# Patient Record
Sex: FEMALE | Race: WHITE | NOT HISPANIC OR LATINO | Employment: UNEMPLOYED | URBAN - NONMETROPOLITAN AREA
[De-identification: names, ages, dates, MRNs, and addresses within clinical notes are randomized per-mention and may not be internally consistent; named-entity substitution may affect disease eponyms.]

---

## 2019-12-10 ENCOUNTER — OFFICE VISIT (OUTPATIENT)
Dept: FAMILY MEDICINE | Facility: OTHER | Age: 34
End: 2019-12-10
Attending: NURSE PRACTITIONER
Payer: COMMERCIAL

## 2019-12-10 ENCOUNTER — HOSPITAL ENCOUNTER (OUTPATIENT)
Dept: GENERAL RADIOLOGY | Facility: OTHER | Age: 34
Discharge: HOME | End: 2019-12-10
Attending: NURSE PRACTITIONER | Admitting: NURSE PRACTITIONER
Payer: COMMERCIAL

## 2019-12-10 VITALS
RESPIRATION RATE: 20 BRPM | TEMPERATURE: 98.5 F | BODY MASS INDEX: 33.96 KG/M2 | SYSTOLIC BLOOD PRESSURE: 118 MMHG | HEART RATE: 92 BPM | OXYGEN SATURATION: 98 % | HEIGHT: 64 IN | WEIGHT: 198.9 LBS | DIASTOLIC BLOOD PRESSURE: 66 MMHG

## 2019-12-10 DIAGNOSIS — R05.9 COUGH: ICD-10-CM

## 2019-12-10 DIAGNOSIS — J00 ACUTE NASOPHARYNGITIS: Primary | ICD-10-CM

## 2019-12-10 PROCEDURE — 71046 X-RAY EXAM CHEST 2 VIEWS: CPT

## 2019-12-10 PROCEDURE — 99202 OFFICE O/P NEW SF 15 MIN: CPT | Performed by: NURSE PRACTITIONER

## 2019-12-10 ASSESSMENT — ENCOUNTER SYMPTOMS
APPETITE CHANGE: 1
SHORTNESS OF BREATH: 1
FEVER: 0
RHINORRHEA: 0
COUGH: 1
HEMATOLOGIC/LYMPHATIC NEGATIVE: 1
SORE THROAT: 1
HEADACHES: 1
GASTROINTESTINAL NEGATIVE: 1

## 2019-12-10 ASSESSMENT — PAIN SCALES - GENERAL: PAINLEVEL: MODERATE PAIN (5)

## 2019-12-10 ASSESSMENT — MIFFLIN-ST. JEOR: SCORE: 1592.2

## 2019-12-10 NOTE — PROGRESS NOTES
"  SUBJECTIVE:   Suzi Miller is a 34 year old female who presents to clinic today for the following health issues:    HPI  Presents with a 3 day history of harsh cough, some sinus congestion - PND, no fever. Pain with cough, feels SOB. Has restrictive airway disease. Moved here in August from [place]. Using sandi for symptoms. Exposed to illness at home. Child at home just finished abx for strep yesterday.      There are no active problems to display for this patient.    History reviewed. No pertinent past medical history.   History reviewed. No pertinent surgical history.  History reviewed. No pertinent family history.  Social History     Tobacco Use     Smoking status: Never Smoker     Smokeless tobacco: Never Used   Substance Use Topics     Alcohol use: Yes     Comment: rare     Social History     Social History Narrative     Not on file     No current outpatient medications on file.     Allergies   Allergen Reactions     Penicillins Anaphylaxis       Review of Systems   Constitutional: Positive for appetite change. Negative for fever.        Pushing fluids   HENT: Positive for postnasal drip and sore throat. Negative for congestion and rhinorrhea.    Respiratory: Positive for cough and shortness of breath.    Gastrointestinal: Negative.    Genitourinary: Negative.    Neurological: Positive for headaches.   Hematological: Negative.         OBJECTIVE:     /66   Pulse 92   Temp 98.5  F (36.9  C) (Tympanic)   Resp 20   Ht 1.626 m (5' 4\")   Wt 90.2 kg (198 lb 14.4 oz)   SpO2 98%   BMI 34.14 kg/m    Body mass index is 34.14 kg/m .  Physical Exam  Vitals signs and nursing note reviewed.   Constitutional:       General: She is not in acute distress.     Appearance: Normal appearance. She is not toxic-appearing.      Comments: Appears mildly ill, not toxic   HENT:      Head: Normocephalic and atraumatic.      Right Ear: Tympanic membrane normal.      Left Ear: Tympanic membrane normal.      Nose: Nose " normal.      Mouth/Throat:      Mouth: Mucous membranes are moist.      Pharynx: Oropharynx is clear. No oropharyngeal exudate.   Eyes:      General: No scleral icterus.     Conjunctiva/sclera: Conjunctivae normal.   Neck:      Musculoskeletal: Normal range of motion and neck supple.   Cardiovascular:      Rate and Rhythm: Normal rate and regular rhythm.      Heart sounds: Normal heart sounds. No murmur.   Pulmonary:      Effort: Pulmonary effort is normal.      Comments: Harsh dry cough, dim left lower lobe, no wheezing  Musculoskeletal: Normal range of motion.   Lymphadenopathy:      Cervical: No cervical adenopathy.   Skin:     General: Skin is warm and dry.   Neurological:      General: No focal deficit present.      Mental Status: She is alert and oriented to person, place, and time.   Psychiatric:         Mood and Affect: Mood normal.         Behavior: Behavior normal.         Diagnostic Test Results:  Results for orders placed or performed in visit on 12/10/19 (from the past 24 hour(s))   XR Chest 2 Views    Narrative    PROCEDURE: XR CHEST 2 VW 12/10/2019 11:39 AM    HISTORY: Cough    COMPARISONS: None.    TECHNIQUE: 2 views.    FINDINGS: Heart and pulmonary vasculature are normal. No acute  infiltrate or effusion is seen.         Impression    IMPRESSION: No acute disease.    LORELEI RMOERO MD       ASSESSMENT/PLAN:       ICD-10-CM    1. Acute nasopharyngitis J00    2. Cough R05 XR Chest 2 Views        PLAN:  Pt is afebrile, 98%, CXR is clear. Does not appear toxic.   Advised likely viral. Encouraged conservative treatments at home.   Monitor closely, f/u in the clinic in 1 week to establish care, sooner PRN.  Given Epic educational materials.     I explained my diagnostic considerations and recommendations to pt who voiced understanding and agreement with the treatment plan. All questions were answered. We discussed potential side effects of any prescribed or recommended therapies, as well as  expectations for response to treatments.    Disclaimer:  This note consists of words and symbols derived from keyboarding, dictation, or using voice recognition software. As a result, there may be errors in the script that have gone undetected. Please consider this when interpreting information found in this note.      ETHEL Hunter, NP-C  12/10/2019 at 11:19 AM  [facility]

## 2019-12-10 NOTE — NURSING NOTE
"Chief Complaint   Patient presents with     Cough     Patient is here for a cough, sore throat, chest pain from coughing and SOB that started Sunday.     Initial /66   Pulse 92   Temp 98.5  F (36.9  C) (Tympanic)   Resp 20   Ht 1.626 m (5' 4\")   Wt 90.2 kg (198 lb 14.4 oz)   SpO2 98%   BMI 34.14 kg/m   Estimated body mass index is 34.14 kg/m  as calculated from the following:    Height as of this encounter: 1.626 m (5' 4\").    Weight as of this encounter: 90.2 kg (198 lb 14.4 oz).  Medication Reconciliation: complete    Amira Salgado LPN  "

## 2019-12-17 ENCOUNTER — OFFICE VISIT (OUTPATIENT)
Dept: FAMILY MEDICINE | Facility: OTHER | Age: 34
End: 2019-12-17
Attending: PHYSICIAN ASSISTANT
Payer: COMMERCIAL

## 2019-12-17 ENCOUNTER — HOSPITAL ENCOUNTER (OUTPATIENT)
Dept: GENERAL RADIOLOGY | Facility: OTHER | Age: 34
Discharge: HOME | End: 2019-12-17
Attending: PHYSICIAN ASSISTANT | Admitting: PHYSICIAN ASSISTANT
Payer: COMMERCIAL

## 2019-12-17 VITALS
DIASTOLIC BLOOD PRESSURE: 82 MMHG | WEIGHT: 196.3 LBS | HEART RATE: 88 BPM | RESPIRATION RATE: 20 BRPM | OXYGEN SATURATION: 95 % | SYSTOLIC BLOOD PRESSURE: 120 MMHG | BODY MASS INDEX: 32.71 KG/M2 | HEIGHT: 65 IN | TEMPERATURE: 99.5 F

## 2019-12-17 DIAGNOSIS — J45.909 BRONCHITIS WITH ASTHMA, ACUTE: Primary | ICD-10-CM

## 2019-12-17 DIAGNOSIS — R05.9 COUGH: ICD-10-CM

## 2019-12-17 DIAGNOSIS — J20.9 BRONCHITIS WITH ASTHMA, ACUTE: Primary | ICD-10-CM

## 2019-12-17 PROCEDURE — 99214 OFFICE O/P EST MOD 30 MIN: CPT | Performed by: PHYSICIAN ASSISTANT

## 2019-12-17 PROCEDURE — 71046 X-RAY EXAM CHEST 2 VIEWS: CPT

## 2019-12-17 RX ORDER — BENZONATATE 200 MG/1
CAPSULE ORAL
Refills: 0 | COMMUNITY
Start: 2019-02-15 | End: 2020-02-20

## 2019-12-17 RX ORDER — ALBUTEROL SULFATE 0.83 MG/ML
2.5 SOLUTION RESPIRATORY (INHALATION) EVERY 6 HOURS PRN
Qty: 30 VIAL | Refills: 0 | Status: SHIPPED | OUTPATIENT
Start: 2019-12-17 | End: 2020-02-20

## 2019-12-17 RX ORDER — BENZONATATE 200 MG/1
200 CAPSULE ORAL 3 TIMES DAILY PRN
Qty: 21 CAPSULE | Refills: 0 | Status: SHIPPED | OUTPATIENT
Start: 2019-12-17 | End: 2020-02-20

## 2019-12-17 RX ORDER — METHYLPREDNISOLONE 4 MG
TABLET, DOSE PACK ORAL
Qty: 21 TABLET | Refills: 0 | Status: SHIPPED | OUTPATIENT
Start: 2019-12-17 | End: 2020-02-20

## 2019-12-17 RX ORDER — ALBUTEROL SULFATE 90 UG/1
1-2 AEROSOL, METERED RESPIRATORY (INHALATION) EVERY 6 HOURS PRN
Qty: 1 INHALER | Refills: 0 | Status: SHIPPED | OUTPATIENT
Start: 2019-12-17 | End: 2020-02-20

## 2019-12-17 ASSESSMENT — PAIN SCALES - GENERAL: PAINLEVEL: MODERATE PAIN (4)

## 2019-12-17 ASSESSMENT — MIFFLIN-ST. JEOR: SCORE: 1589.41

## 2019-12-17 NOTE — NURSING NOTE
"Chief Complaint   Patient presents with     Cough     states throat isn't sore but feels pressure.       Initial /82   Pulse 88   Temp 99.5  F (37.5  C) (Tympanic)   Resp 20   Ht 1.64 m (5' 4.57\")   Wt 89 kg (196 lb 4.8 oz)   LMP 11/26/2019 (Exact Date)   SpO2 95%   BMI 33.11 kg/m   Estimated body mass index is 33.11 kg/m  as calculated from the following:    Height as of this encounter: 1.64 m (5' 4.57\").    Weight as of this encounter: 89 kg (196 lb 4.8 oz).  Medication Reconciliation: complete    Carmella Cantu LPN  "

## 2019-12-17 NOTE — PROGRESS NOTES
"SUBJECTIVE:  Suzi Miller is a 34 year old female who presents to the clinic today for evaluation of cough.   Onset 9 days ago, course is gradually worsening  Associated symptoms: dry deep cough, chest pain with deep breath and cough. Left ear and throat pain.   No fever    Treatments - Mucinex DM x 3 days with no help. Caused her to throw up  Exposures - not known  History of asthma - positive, she hasn't used inhaler for many years  Environmental allergies - when she was younger - no routine treatments  Tobacco use or second hand smoke exposure history - none   She has had pneumonia 3 times      No past medical history on file.   Social History     Tobacco Use     Smoking status: Never Smoker     Smokeless tobacco: Never Used   Substance Use Topics     Alcohol use: Yes     Comment: rare     Current Outpatient Medications   Medication     APAP-Parabrom-Pyrilamine (PAMPRIN MULTI-SYMPTOM) 500-25-15 MG TABS     melatonin 3 MG CAPS     benzonatate (TESSALON) 200 MG capsule     No current facility-administered medications for this visit.         Allergies   Allergen Reactions     Penicillins Anaphylaxis       ROS  General fatigue, no fever  HENT PND, left ear pain  Respiratory POSITIVE per HPI  Abdomen negative  Skin: negative      OBJECTIVE:  Exam:  Vitals:    12/17/19 1119   BP: 120/82   Pulse: 88   Resp: 20   Temp: 99.5  F (37.5  C)   TempSrc: Tympanic   SpO2: 95%   Weight: 89 kg (196 lb 4.8 oz)   Height: 1.64 m (5' 4.57\")     General: healthy, alert and no distress, appears hydarated, vital signs stable   Head: NORMAL - atraumatic, nontender.  Ears: R TM mild effusion, L TM retracted  Eyes: NORMAL - no injection no discharge, no periorbital swelling.  Nose: ABNORMAL - swollen nasal turbinates. No sinus tenderness  Neck: supple, non-tender, free range of motion, no adenopathy  Throat: ABNORMAL - mild erythema, no exudates or petechia.  Resp: Normal - Clear to auscultation without rales, rhonchi, or " wheezing.  Cardiac: NORMAL - regular rate and rhythm without murmur.  Skin: NORMAL - no rash      XR Chest 2 Views    Narrative    PROCEDURE:  XR CHEST 2 VW    HISTORY: cough, chest pain on left; Cough, .    COMPARISON:  12/10/19    FINDINGS:  The cardiomediastinal contours are normal.  The trachea is midline.  No focal consolidation, effusion or pneumothorax.    No suspicious osseous lesion or subdiaphragmatic free air.      Impression    IMPRESSION:      No acute cardiopulmonary process.  Stable chest.    EDIE SANCHEZ MD         ASSESSMENT:    (J20.9,  J45.909) Bronchitis with asthma, acute  (primary encounter diagnosis)  Plan: methylPREDNISolone (MEDROL DOSEPAK) 4 MG tablet        therapy pack, albuterol (PROVENTIL) (2.5         MG/3ML) 0.083% neb solution, albuterol (PROAIR         HFA/PROVENTIL HFA/VENTOLIN HFA) 108 (90 Base)         MCG/ACT inhaler, benzonatate (TESSALON) 200 MG         capsule    (R05) Cough  Plan: XR Chest 2 Views      Acute Bronchitis with asthma exacerbation  Chest XR negative for pneumonia. XR independently reviewed.   Rest, fluids  Humidified air  Start medrol dose pack, take as directed  Albuterol Neb or inhaler every 4-6 hours as needed for cough, wheezing  Benzonatate 200 mg oral tablet, take 3 times/day as needed for cough  OTC guaifenesin with dextromethorphan (Mucinex/robitussin DM) take as directed  Return to clinic if symptoms persist or worsen  Patient received verbal and written instruction including review of warning signs    Susana King PA-C on 12/19/2019 at 7:23 AM

## 2020-02-20 ENCOUNTER — OFFICE VISIT (OUTPATIENT)
Dept: FAMILY MEDICINE | Facility: OTHER | Age: 35
End: 2020-02-20
Attending: FAMILY MEDICINE
Payer: COMMERCIAL

## 2020-02-20 VITALS
HEIGHT: 64 IN | RESPIRATION RATE: 18 BRPM | DIASTOLIC BLOOD PRESSURE: 72 MMHG | WEIGHT: 203 LBS | HEART RATE: 80 BPM | OXYGEN SATURATION: 98 % | TEMPERATURE: 97.6 F | SYSTOLIC BLOOD PRESSURE: 120 MMHG | BODY MASS INDEX: 34.66 KG/M2

## 2020-02-20 DIAGNOSIS — E06.3 HASHIMOTO'S THYROIDITIS: Primary | ICD-10-CM

## 2020-02-20 LAB
BASOPHILS # BLD AUTO: 0 10E9/L (ref 0–0.2)
BASOPHILS NFR BLD AUTO: 0.5 %
DIFFERENTIAL METHOD BLD: NORMAL
EOSINOPHIL # BLD AUTO: 0.2 10E9/L (ref 0–0.7)
EOSINOPHIL NFR BLD AUTO: 2.3 %
ERYTHROCYTE [DISTWIDTH] IN BLOOD BY AUTOMATED COUNT: 12.1 % (ref 10–15)
HCT VFR BLD AUTO: 39.1 % (ref 35–47)
HGB BLD-MCNC: 12.8 G/DL (ref 11.7–15.7)
IMM GRANULOCYTES # BLD: 0 10E9/L (ref 0–0.4)
IMM GRANULOCYTES NFR BLD: 0.4 %
LYMPHOCYTES # BLD AUTO: 2.8 10E9/L (ref 0.8–5.3)
LYMPHOCYTES NFR BLD AUTO: 35.4 %
MCH RBC QN AUTO: 30.7 PG (ref 26.5–33)
MCHC RBC AUTO-ENTMCNC: 32.7 G/DL (ref 31.5–36.5)
MCV RBC AUTO: 94 FL (ref 78–100)
MONOCYTES # BLD AUTO: 0.6 10E9/L (ref 0–1.3)
MONOCYTES NFR BLD AUTO: 7.7 %
NEUTROPHILS # BLD AUTO: 4.3 10E9/L (ref 1.6–8.3)
NEUTROPHILS NFR BLD AUTO: 53.7 %
PLATELET # BLD AUTO: 329 10E9/L (ref 150–450)
RBC # BLD AUTO: 4.17 10E12/L (ref 3.8–5.2)
T3FREE SERPL-MCNC: 3.4 PG/ML (ref 2.5–3.9)
T4 FREE SERPL-MCNC: 0.8 NG/DL (ref 0.6–1.6)
TSH SERPL DL<=0.05 MIU/L-ACNC: 1.96 IU/ML (ref 0.34–5.6)
WBC # BLD AUTO: 7.9 10E9/L (ref 4–11)

## 2020-02-20 PROCEDURE — 84439 ASSAY OF FREE THYROXINE: CPT | Mod: ZL | Performed by: FAMILY MEDICINE

## 2020-02-20 PROCEDURE — 99213 OFFICE O/P EST LOW 20 MIN: CPT | Performed by: FAMILY MEDICINE

## 2020-02-20 PROCEDURE — 36415 COLL VENOUS BLD VENIPUNCTURE: CPT | Mod: ZL | Performed by: FAMILY MEDICINE

## 2020-02-20 PROCEDURE — 84481 FREE ASSAY (FT-3): CPT | Mod: ZL | Performed by: FAMILY MEDICINE

## 2020-02-20 PROCEDURE — 84443 ASSAY THYROID STIM HORMONE: CPT | Mod: ZL | Performed by: FAMILY MEDICINE

## 2020-02-20 PROCEDURE — 85025 COMPLETE CBC W/AUTO DIFF WBC: CPT | Mod: ZL | Performed by: FAMILY MEDICINE

## 2020-02-20 ASSESSMENT — MIFFLIN-ST. JEOR: SCORE: 1614.77

## 2020-02-20 ASSESSMENT — PAIN SCALES - GENERAL: PAINLEVEL: NO PAIN (0)

## 2020-02-20 NOTE — NURSING NOTE
Pt presents to clinic today to Establish care and discuss her thyroid.      Medication Reconciliation: complete  Natalya Santos LPN

## 2020-02-20 NOTE — PROGRESS NOTES
"  SUBJECTIVE:   Suzi Miller is a 34 year old female who presents to clinic today for the following health issues:  Nursing Notes:   Natalya Santos, LPN  2/20/2020 11:13 AM  Signed  Pt presents to clinic today to Establish care and discuss her thyroid.      Medication Reconciliation: complete  Natalya Santos LPN    HPI   33 yo female presents to establish care. Moved from [place] to be near family.     with 3 boys (14, 11,8)    Hashimoto's thyroiditis diagnosed 1 year ago after a thyroid nodule. Has never been on medication. Complains of weight gain, fatigue and diffuse pain. No constipation or hair/skin changes      History of LEEP, paps normal since            There are no active problems to display for this patient.    Past Medical History:   Diagnosis Date     Endometriosis      Hashimoto's thyroiditis       Current Outpatient Medications   Medication Sig Dispense Refill     APAP-Parabrom-Pyrilamine (PAMPRIN MULTI-SYMPTOM) 500-25-15 MG TABS Take 2 tablets by mouth At Bedtime       melatonin 3 MG CAPS Take 1 capsule by mouth At Bedtime       UNABLE TO FIND MEDICATION NAME: Protandim         Review of Systems   Weight is stable.  Complains of fatigue.  No constipation hair or skin changes.    OBJECTIVE:     /72   Pulse 80   Temp 97.6  F (36.4  C) (Temporal)   Resp 18   Ht 1.632 m (5' 4.25\")   Wt 92.1 kg (203 lb)   LMP 02/15/2020   SpO2 98%   Breastfeeding No   BMI 34.57 kg/m    Body mass index is 34.57 kg/m .  Physical Exam  Neck:      Musculoskeletal: Normal range of motion. No neck rigidity.   Cardiovascular:      Rate and Rhythm: Normal rate.      Heart sounds: No murmur.   Lymphadenopathy:      Cervical: No cervical adenopathy.   Neurological:      Mental Status: She is alert.   Psychiatric:         Mood and Affect: Mood normal.         Diagnostic Test Results:  none     ASSESSMENT/PLAN:         1. Hashimoto's thyroiditis    - TSH; Future  - T3, Free; Future  - T4, Free; Future  - " CBC with platelets differential; Future  - CBC with platelets differential  - T4, Free  - T3, Free  - TSH  Recommend proceeding with above laboratory results.  Could be related to hypothyroidism.  Have asked her to return to clinic to follow-up on these results and discuss other potential options.  Will try to obtain medical records from [place].    Sheree White MD  [facility]

## 2020-02-21 ASSESSMENT — ASTHMA QUESTIONNAIRES: ACT_TOTALSCORE: 25

## 2020-03-11 ENCOUNTER — HEALTH MAINTENANCE LETTER (OUTPATIENT)
Age: 35
End: 2020-03-11

## 2020-10-08 ENCOUNTER — OFFICE VISIT (OUTPATIENT)
Dept: FAMILY MEDICINE | Facility: OTHER | Age: 35
End: 2020-10-08
Payer: COMMERCIAL

## 2020-10-08 VITALS
OXYGEN SATURATION: 100 % | WEIGHT: 209.3 LBS | RESPIRATION RATE: 16 BRPM | HEIGHT: 65 IN | TEMPERATURE: 98.4 F | DIASTOLIC BLOOD PRESSURE: 98 MMHG | BODY MASS INDEX: 34.87 KG/M2 | SYSTOLIC BLOOD PRESSURE: 136 MMHG | HEART RATE: 82 BPM
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
## 2020-10-08 DIAGNOSIS — S61.051A CAT BITE OF THUMB, RIGHT, INITIAL ENCOUNTER: Primary | ICD-10-CM

## 2020-10-08 PROCEDURE — 99214 OFFICE O/P EST MOD 30 MIN: CPT | Mod: 25 | Performed by: NURSE PRACTITIONER

## 2020-10-08 PROCEDURE — 90471 IMMUNIZATION ADMIN: CPT | Performed by: NURSE PRACTITIONER

## 2020-10-08 PROCEDURE — 90715 TDAP VACCINE 7 YRS/> IM: CPT | Performed by: NURSE PRACTITIONER

## 2020-10-08 RX ORDER — MOXIFLOXACIN HYDROCHLORIDE 400 MG/1
400 TABLET ORAL DAILY
Qty: 5 TABLET | Refills: 0 | Status: SHIPPED | OUTPATIENT
Start: 2020-10-08 | End: 2020-10-13

## 2020-10-08 ASSESSMENT — PAIN SCALES - GENERAL: PAINLEVEL: SEVERE PAIN (7)

## 2020-10-08 ASSESSMENT — MIFFLIN-ST. JEOR: SCORE: 1642.32

## 2020-10-08 NOTE — LETTER
My Asthma Action Plan    Name: Suzi Miller   YOB: 1985  Date: 10/8/2020   My doctor: BAIRON MELENDEZ NURSE   My clinic: [facility]        My Rescue Medicine:   Albuterol inhaler (Proair/Ventolin/Proventil HFA)  2-4 puffs EVERY 4 HOURS as needed. Use a spacer if recommended by your provider.   My Asthma Severity:   Intermittent / Exercise Induced  Know your asthma triggers: smoke             GREEN ZONE   Good Control    I feel good    No cough or wheeze    Can work, sleep and play without asthma symptoms       Take your asthma control medicine every day.     1. If exercise triggers your asthma, take your rescue medication    15 minutes before exercise or sports, and    During exercise if you have asthma symptoms  2. Spacer to use with inhaler: If you have a spacer, make sure to use it with your inhaler             YELLOW ZONE Getting Worse  I have ANY of these:    I do not feel good    Cough or wheeze    Chest feels tight    Wake up at night   1. Keep taking your Green Zone medications  2. Start taking your rescue medicine:    every 20 minutes for up to 1 hour. Then every 4 hours for 24-48 hours.  3. If you stay in the Yellow Zone for more than 12-24 hours, contact your doctor.  4. If you do not return to the Green Zone in 12-24 hours or you get worse, start taking your oral steroid medicine if prescribed by your provider.           RED ZONE Medical Alert - Get Help  I have ANY of these:    I feel awful    Medicine is not helping    Breathing getting harder    Trouble walking or talking    Nose opens wide to breathe       1. Take your rescue medicine NOW  2. If your provider has prescribed an oral steroid medicine, start taking it NOW  3. Call your doctor NOW  4. If you are still in the Red Zone after 20 minutes and you have not reached your doctor:    Take your rescue medicine again and    Call 911 or go to the emergency room right away    See your regular doctor within 2 weeks of an  Emergency Room or Urgent Care visit for follow-up treatment.          Annual Reminders:  Meet with Asthma Educator,  Flu Shot in the Fall, consider Pneumonia Vaccination for patients with asthma (aged 19 and older).    Pharmacy: [id number] - [place], [place] - [address]    Electronically signed by  NORA NURSE   Date: 10/08/20                    Asthma Triggers  How To Control Things That Make Your Asthma Worse    Triggers are things that make your asthma worse.  Look at the list below to help you find your triggers and   what you can do about them. You can help prevent asthma flare-ups by staying away from your triggers.      Trigger                                                          What you can do   Cigarette Smoke  Tobacco smoke can make asthma worse. Do not allow smoking in your home, car or around you.  Be sure no one smokes at a child s day care or school.  If you smoke, ask your health care provider for ways to help you quit.  Ask family members to quit too.  Ask your health care provider for a referral to Quit Plan to help you quit smoking, or call 7-917-056-PLAN.     Colds, Flu, Bronchitis  These are common triggers of asthma. Wash your hands often.  Don t touch your eyes, nose or mouth.  Get a flu shot every year.     Dust Mites  These are tiny bugs that live in cloth or carpet. They are too small to see. Wash sheets and blankets in hot water every week.   Encase pillows and mattress in dust mite proof covers.  Avoid having carpet if you can. If you have carpet, vacuum weekly.   Use a dust mask and HEPA vacuum.   Pollen and Outdoor Mold  Some people are allergic to trees, grass, or weed pollen, or molds. Try to keep your windows closed.  Limit time out doors when pollen count is high.   Ask you health care provider about taking medicine during allergy season.     Animal Dander  Some people are allergic to skin flakes, urine or saliva from pets with fur or  feathers. Keep pets with fur or feathers out of your home.    If you can t keep the pet outdoors, then keep the pet out of your bedroom.  Keep the bedroom door closed.  Keep pets off cloth furniture and away from stuffed toys.     Mice, Rats, and Cockroaches  Some people are allergic to the waste from these pests.   Cover food and garbage.  Clean up spills and food crumbs.  Store grease in the refrigerator.   Keep food out of the bedroom.   Indoor Mold  This can be a trigger if your home has high moisture. Fix leaking faucets, pipes, or other sources of water.   Clean moldy surfaces.  Dehumidify basement if it is damp and smelly.   Smoke, Strong Odors, and Sprays  These can reduce air quality. Stay away from strong odors and sprays, such as perfume, powder, hair spray, paints, smoke incense, paint, cleaning products, candles and new carpet.   Exercise or Sports  Some people with asthma have this trigger. Be active!  Ask your doctor about taking medicine before sports or exercise to prevent symptoms.    Warm up for 5-10 minutes before and after sports or exercise.     Other Triggers of Asthma  Cold air:  Cover your nose and mouth with a scarf.  Sometimes laughing or crying can be a trigger.  Some medicines and food can trigger asthma.

## 2020-10-09 ASSESSMENT — ASTHMA QUESTIONNAIRES: ACT_TOTALSCORE: 25

## 2020-10-09 NOTE — NURSING NOTE
"Chief Complaint   Patient presents with     Cat Bite     right hand     Cat bit her right thumb tonight while giver the cat medicine and scratched the back of her hand.    Initial BP (!) 136/98   Pulse 82   Temp 98.4  F (36.9  C) (Tympanic)   Resp 16   Ht 1.638 m (5' 4.5\")   Wt 94.9 kg (209 lb 4.8 oz)   SpO2 100%   BMI 35.37 kg/m   Estimated body mass index is 35.37 kg/m  as calculated from the following:    Height as of this encounter: 1.638 m (5' 4.5\").    Weight as of this encounter: 94.9 kg (209 lb 4.8 oz).    Medication Reconciliation: complete      Norma J. Gosselin, LPN  "

## 2020-10-09 NOTE — PROGRESS NOTES
"HPI:    Suzi Miller is a 35 year old female  who presents to [facility] today for cat bite.    States she was giving her cat medication and the cat bite down on her right thumb causing a puncture wound.  Pain initially just at the location of the bite but now pain includes entire thumb and radiating down wrist.  Injury occurred about 4 hours ago.  Indoor cat but got outside a few weeks ago and got fleas.  He has had his rabies vaccine in the past but is recently due for his next dose.  He is not exhibiting any signs of illness or aggression.  She has washed the wound at home with soapy water.    Unsure of last tetanus, none documented in chart.    No tylenol or ibuprofen.      Past Medical History:   Diagnosis Date     Endometriosis      Hashimoto's thyroiditis      Past Surgical History:   Procedure Laterality Date     C/SECTION, LOW TRANSVERSE       CLOSED RX BIG TOE FRACTURE  2009    hardware     EXCISE NODULE THYROID       LEEP TX, CERVICAL  2003     Social History     Tobacco Use     Smoking status: Never Smoker     Smokeless tobacco: Never Used   Substance Use Topics     Alcohol use: Yes     Comment: rare     Current Outpatient Medications   Medication Sig Dispense Refill     APAP-Parabrom-Pyrilamine (PAMPRIN MULTI-SYMPTOM) 500-25-15 MG TABS Take 2 tablets by mouth At Bedtime       melatonin 3 MG CAPS Take 1 capsule by mouth At Bedtime       UNABLE TO FIND MEDICATION NAME: Protandim       Allergies   Allergen Reactions     Penicillins Anaphylaxis         Past medical history, past surgical history, current medications and allergies reviewed and accurate to the best of my knowledge.        ROS:  Refer to HPI    BP (!) 136/98   Pulse 82   Temp 98.4  F (36.9  C) (Tympanic)   Resp 16   Ht 1.638 m (5' 4.5\")   Wt 94.9 kg (209 lb 4.8 oz)   SpO2 100%   BMI 35.37 kg/m      EXAM:  General Appearance: Well appearing adult female, non ill appearance, appropriate appearance for age. No acute " distress  Cardiovascular:  CMS intact to right upper extremity, brisk capillary refill, strong radial pulse   Musculoskeletal:  Equal movement of bilateral upper extremities.  Right thumb without noted swelling.  Right thumb with tenderness along the base on both sides.  Right thumb with mildly decreased ROM due to guarding and pain.  Equal movement of bilateral lower extremities.  Normal gait.    Dermatological: Right thumb with single tiny puncture bites at base of thumb on both the lateral and medial sides, no surrounding erythema or warmth, no drainage or bleeding.  Psychological: normal affect, alert, oriented, and pleasant.           ASSESSMENT/PLAN:  1. Cat bite of thumb, right, initial encounter    -  IMM-  TDAP VACCINE (BOOSTRIX )    - moxifloxacin (AVELOX) 400 MG tablet; Take 1 tablet (400 mg) by mouth daily for 5 days  Dispense: 5 tablet; Refill: 0    Wounds and surrounding skin  was washed in clinic today with hydrogen peroxide and ChloraPrep.    Patient with anaphylactic allergy to penicillin so therefore cannot use monotherapy with Augmentin.  Patient is not thrilled about having to take multiple medications so review of up-to-date does indicate that we can use moxifloxacin as a monotherapy.  Patient is instructed to Wash the wounds frequently with soapy water.    May use over-the-counter Tylenol or ibuprofen PRN    Discussed warning signs/symptoms indicative of need to f/u including but not limited to increased pain increased redness or swelling, drainage or decreased movement.  Follow up if symptoms persist or worsen or concerns      I explained my diagnostic considerations and recommendations to the patient, who voiced understanding and agreement with the treatment plan. All questions were answered. We discussed potential side effects of any prescribed or recommended therapies, as well as expectations for response to treatments.    Disclaimer:  This note consists of words and symbols derived from  keyboarding, dictation, or using voice recognition software. As a result, there may be errors in the script that have gone undetected. Please consider this when interpreting information found in this note.

## 2020-10-09 NOTE — PATIENT INSTRUCTIONS
Patient Education     Cat Bite    A cat bite can cause a wound deep enough to break the skin. In such cases, the wound is cleaned and then sometimes closed. If the wound is closed it is usually not closed completely. This is so that fluid can drain if the wound becomes infected. Often the wound is left open to heal. In addition to wound care, a tetanus shot may be given, if needed.  Home care    Wash your hands well with soap and warm water before and after caring for the wound. This helps lower the risk of infection.    Care for the wound as directed. If a dressing was applied to the wound, be sure to change it as directed.    If the wound bleeds, place a clean, soft cloth on the wound. Then firmly apply pressure until the bleeding stops. This may take up to 5 minutes. Don't release the pressure and look at the wound during this time.    Always get medical attention for cat bites on the hand. They are highly likely to become infected.    Most wounds heal within 10 days. But an infection can occur even with proper treatment. So be sure to check the wound daily for signs of infection (see below).    Antibiotics may be prescribed. These help prevent or treat infection. If you re given antibiotics, take them as directed. Also be sure to complete the medicines.  Rabies prevention  Rabies is a virus that can be carried in certain animals. These can include domestic animals such as cats and dogs. Pets fully vaccinated against rabies (2 shots) are at very low risk of infection. But because human rabies is almost always fatal, any biting pet should be confined for 10 days as an extra precaution. In general, if there is a risk for rabies, the following steps may need to be taken:    If someone s pet cat has bitten you, it should be kept in a secure area for the next 10 days to watch for signs of illness. If the pet owner won t allow this, contact your local animal control center. If the cat becomes ill or dies during that  time, contact your local animal control center at once so the animal may be tested for rabies. If the cat stays healthy for the next 10 days, there is no danger of rabies in the animal or you.    If a stray cat bit you, contact your local animal control center. They can give information on capture, quarantine, and animal rabies testing.    If you can t find the animal that bit you in the next 2 days, and if rabies exists in your area, you may need to receive the rabies vaccine series. Call your healthcare provider right away. Or return to the emergency department promptly.    All animal bites should be reported to the local animal control center. If you were not given a form to fill out, you can report this yourself.  Follow-up care  Follow up with your healthcare provider, or as directed.  When to seek medical advice  Call your healthcare provider right away if any of these occur:    Signs of infection:  ? Spreading redness or warmth from the wound  ? Increased pain or swelling  ? Fever of 100.4 F (38 C) or higher, or as directed by your healthcare provider  ? Colored fluid or pus draining from the wound  ? Enlarged lymph nodes above the area that was bitten, such as lymph nodes in the armpit if you were bitten on the hand or arm. This may be a sign of cat-scratch disease (cat-scratch fever).    Signs of rabies infection:  ? Headache  ? Confusion  ? Strange behavior  ? Increased salivating or drooling  ? Seizure    Decreased ability to move any body part near the bite area    Bleeding that can't be stopped after 5 minutes of firm pressure  Date Last Reviewed: 5/1/2018 2000-2019 [employer]. [address] [place] [address], [place] [address]. All rights reserved. This information is not intended as a substitute for professional medical care. Always follow your healthcare professional's instructions.

## 2021-01-04 ENCOUNTER — HEALTH MAINTENANCE LETTER (OUTPATIENT)
Age: 36
End: 2021-01-04

## 2021-01-11 ENCOUNTER — OFFICE VISIT (OUTPATIENT)
Dept: FAMILY MEDICINE | Facility: OTHER | Age: 36
End: 2021-01-11
Attending: PHYSICIAN ASSISTANT
Payer: COMMERCIAL

## 2021-01-11 VITALS
BODY MASS INDEX: 34.54 KG/M2 | WEIGHT: 204.4 LBS | TEMPERATURE: 98.6 F | HEART RATE: 84 BPM | RESPIRATION RATE: 20 BRPM | DIASTOLIC BLOOD PRESSURE: 86 MMHG | SYSTOLIC BLOOD PRESSURE: 112 MMHG

## 2021-01-11 DIAGNOSIS — R39.9 URINARY SYMPTOM OR SIGN: ICD-10-CM

## 2021-01-11 DIAGNOSIS — R11.0 NAUSEA: ICD-10-CM

## 2021-01-11 DIAGNOSIS — N39.0 ACUTE UTI: Primary | ICD-10-CM

## 2021-01-11 LAB
ALBUMIN UR-MCNC: 10 MG/DL
APPEARANCE UR: CLEAR
BILIRUB UR QL STRIP: NEGATIVE
COLOR UR AUTO: YELLOW
GLUCOSE UR STRIP-MCNC: NEGATIVE MG/DL
HGB UR QL STRIP: NEGATIVE
HYALINE CASTS #/AREA URNS LPF: 1 /LPF (ref 0–2)
KETONES UR STRIP-MCNC: NEGATIVE MG/DL
LEUKOCYTE ESTERASE UR QL STRIP: NEGATIVE
MUCOUS THREADS #/AREA URNS LPF: PRESENT /LPF
NITRATE UR QL: NEGATIVE
PH UR STRIP: 5.5 PH (ref 5–7)
RBC #/AREA URNS AUTO: 0 /HPF (ref 0–2)
SOURCE: ABNORMAL
SP GR UR STRIP: 1.03 (ref 1–1.03)
UROBILINOGEN UR STRIP-MCNC: NORMAL MG/DL (ref 0–2)
WBC #/AREA URNS AUTO: 1 /HPF (ref 0–5)

## 2021-01-11 PROCEDURE — 99213 OFFICE O/P EST LOW 20 MIN: CPT | Performed by: PHYSICIAN ASSISTANT

## 2021-01-11 PROCEDURE — G0463 HOSPITAL OUTPT CLINIC VISIT: HCPCS | Performed by: PHYSICIAN ASSISTANT

## 2021-01-11 PROCEDURE — 87086 URINE CULTURE/COLONY COUNT: CPT | Mod: ZL | Performed by: PHYSICIAN ASSISTANT

## 2021-01-11 PROCEDURE — 81001 URINALYSIS AUTO W/SCOPE: CPT | Mod: ZL | Performed by: PHYSICIAN ASSISTANT

## 2021-01-11 RX ORDER — ONDANSETRON 4 MG/1
4 TABLET, ORALLY DISINTEGRATING ORAL EVERY 8 HOURS PRN
Qty: 20 TABLET | Refills: 0 | Status: SHIPPED | OUTPATIENT
Start: 2021-01-11 | End: 2021-06-01

## 2021-01-11 RX ORDER — CIPROFLOXACIN 500 MG/1
500 TABLET, FILM COATED ORAL 2 TIMES DAILY
Qty: 14 TABLET | Refills: 0 | Status: SHIPPED | OUTPATIENT
Start: 2021-01-11 | End: 2021-01-19

## 2021-01-11 NOTE — NURSING NOTE
Chief Complaint   Patient presents with     Urinary Problem     Vaginal Problem         Medication Reconciliation: complete    Laura Ribera, LPN

## 2021-01-11 NOTE — PROGRESS NOTES
Nursing Notes:   Laura Ribera LPN  1/11/2021  2:16 PM  Signed  Chief Complaint   Patient presents with     Urinary Problem     Vaginal Problem         Medication Reconciliation: complete    Laura Ribera LPN        HPI:    Suzi Miller is a 35 year old female who presents for urinary and bladder concerns.  Over the last 1.5 weeks she has had urinary symptoms.  She has noticed some urine order along with fatigue and nausea initially.  Symptoms come and go.  Symptoms initially started 1 and half weeks ago.  History of Hashimoto's in the past and she states that the right side of her thyroid is gone.  Initially had fatigue that worsened and then she developed stomach upset.  She is try to drink a lot of water.  Having some stomach cramping.  Last night she said that she woke up twice in a puddle.  Unsure if she is sweating or if it was urine.  Having some dysuria however she also has pelvic discomfort not when she is urinating.  Urine is darker.  Unsure if she has hematuria.  Having urinary frequency.  She has been up at night pain.  Having some urgency.  No vaginal itching or discharge.  No pregnancy concerns.  Having some diarrhea.  Using Pepto-Bismol as needed for the diarrhea.  The diarrhea has been improving.  Previously was nauseous however this is also improved.  Keeping food and fluids down.  No dehydration concerns.  No back concerns.  No fevers or chills.      Past Medical History:   Diagnosis Date     Endometriosis      Hashimoto's thyroiditis        Past Surgical History:   Procedure Laterality Date     C/SECTION, LOW TRANSVERSE       CLOSED RX BIG TOE FRACTURE  2009    hardware     EXCISE NODULE THYROID       LEEP TX, CERVICAL  2003       History reviewed. No pertinent family history.    Social History     Tobacco Use     Smoking status: Never Smoker     Smokeless tobacco: Never Used   Substance Use Topics     Alcohol use: Yes     Comment: rare       Current Outpatient Medications    Medication Sig Dispense Refill     APAP-Parabrom-Pyrilamine (PAMPRIN MULTI-SYMPTOM) 500-25-15 MG TABS Take 2 tablets by mouth At Bedtime       ciprofloxacin (CIPRO) 500 MG tablet Take 1 tablet (500 mg) by mouth 2 times daily for 7 days 14 tablet 0     melatonin 3 MG CAPS Take 1 capsule by mouth At Bedtime       ondansetron (ZOFRAN-ODT) 4 MG ODT tab Take 1 tablet (4 mg) by mouth every 8 hours as needed for nausea or vomiting 20 tablet 0     UNABLE TO FIND MEDICATION NAME: Protandim         Allergies   Allergen Reactions     Penicillins Anaphylaxis       REVIEW OF SYSTEMS:  Refer to HPI.    EXAM:   Vitals:    /86 (BP Location: Right arm, Patient Position: Sitting, Cuff Size: Adult Regular)   Pulse 84   Temp 98.6  F (37  C)   Resp 20   Wt 92.7 kg (204 lb 6.4 oz)   LMP 12/24/2020 (Approximate)   BMI 34.54 kg/m      General Appearance: Pleasant, alert, appropriate appearance for age. No acute distress  Chest/Respiratory Exam: Normal chest wall and respirations. Clear to auscultation.  Cardiovascular Exam: Regular rate and rhythm. S1, S2, no murmur, click, gallop, or rubs.  Gastrointestinal Exam: Soft, no masses or organomegaly. Abdomen non-tender. Normal BS x 4. Suprapubic tenderness. No CVA tenderness to palpation. No rebound tenderness or guarding.  Psychiatric Exam: Alert and oriented - appropriate affect.    PHQDepression Screen  No flowsheet data found.    Labs:   Results for orders placed or performed in visit on 01/11/21   UA reflex to Microscopic     Status: Abnormal   Result Value Ref Range    Color Urine Yellow     Appearance Urine Clear     Glucose Urine Negative NEG^Negative mg/dL    Bilirubin Urine Negative NEG^Negative    Ketones Urine Negative NEG^Negative mg/dL    Specific Gravity Urine 1.030 1.003 - 1.035    Blood Urine Negative NEG^Negative    pH Urine 5.5 5.0 - 7.0 pH    Protein Albumin Urine 10 (A) NEG^Negative mg/dL    Urobilinogen mg/dL Normal 0.0 - 2.0 mg/dL    Nitrite Urine Negative  NEG^Negative    Leukocyte Esterase Urine Negative NEG^Negative    Source Midstream Urine     RBC Urine 0 0 - 2 /HPF    WBC Urine 1 0 - 5 /HPF    Mucous Urine Present (A) NEG^Negative /LPF    Hyaline Casts 1 0 - 2 /LPF   Urine Culture Aerobic Bacterial     Status: None    Specimen: Urine clean catch; Midstream Urine   Result Value Ref Range    Specimen Description Midstream Urine     Culture Micro       Urine culture negative (no growth of uropathogens).       ASSESSMENT AND PLAN:      ICD-10-CM    1. Acute UTI  N39.0 ciprofloxacin (CIPRO) 500 MG tablet     Urine Culture Aerobic Bacterial   2. Urinary symptom or sign  R39.9 UA reflex to Microscopic     Urine Culture Aerobic Bacterial   3. Nausea  R11.0 ondansetron (ZOFRAN-ODT) 4 MG ODT tab     Completed urinalysis which was unremarkable.  Due to patient's current symptoms she was started on ciprofloxacin for a probable UTI.  Urine culture is pending.  She was also given Zofran to use as needed for nausea.  Gave warning signs and symptoms.  Encouraged bland diet.  Increase fluid intake.  Return if symptoms are not calming down or worsening if needed.    Antibiotic has been sent to pharmacy. Please take full course of antibiotic even if symptoms have completely resolved. This helps prevent against antibiotic resistance.     We will culture the urine to see what bacteria grows out of the urine.  We will call if a change of antibiotic is necessary per the culture.  Please increase fluids including water and cranberry juice.  Monitor for fevers, chills, back pain.  Return to clinic after antibiotic completion if symptoms are not resolved.  Call clinic if symptoms change/worsen.      Patient Instructions   Antibiotic has been sent to pharmacy. Please take full course of antibiotic even if symptoms have completely resolved. This helps prevent against antibiotic resistance.     We will culture the urine to see what bacteria grows out of the urine.  We will call if a change  of antibiotic is necessary per the culture.  Please increase fluids including water and cranberry juice.  Monitor for fevers, chills, back pain.  Return to clinic after antibiotic completion if symptoms are not resolved.  Call clinic if symptoms change/worsen.         Maryanne Ayala PA-C PA-C..................1/11/2021 12:48 PM

## 2021-01-11 NOTE — PATIENT INSTRUCTIONS
Antibiotic has been sent to pharmacy. Please take full course of antibiotic even if symptoms have completely resolved. This helps prevent against antibiotic resistance.     We will culture the urine to see what bacteria grows out of the urine.  We will call if a change of antibiotic is necessary per the culture.  Please increase fluids including water and cranberry juice.  Monitor for fevers, chills, back pain.  Return to clinic after antibiotic completion if symptoms are not resolved.  Call clinic if symptoms change/worsen.

## 2021-01-13 LAB
BACTERIA SPEC CULT: NORMAL
SPECIMEN SOURCE: NORMAL

## 2021-01-19 ENCOUNTER — OFFICE VISIT (OUTPATIENT)
Dept: FAMILY MEDICINE | Facility: OTHER | Age: 36
End: 2021-01-19
Attending: PHYSICIAN ASSISTANT
Payer: COMMERCIAL

## 2021-01-19 ENCOUNTER — HOSPITAL ENCOUNTER (OUTPATIENT)
Dept: ULTRASOUND IMAGING | Facility: OTHER | Age: 36
End: 2021-01-19
Attending: PHYSICIAN ASSISTANT
Payer: COMMERCIAL

## 2021-01-19 ENCOUNTER — HOSPITAL ENCOUNTER (OUTPATIENT)
Dept: GENERAL RADIOLOGY | Facility: OTHER | Age: 36
End: 2021-01-19
Attending: PHYSICIAN ASSISTANT
Payer: COMMERCIAL

## 2021-01-19 VITALS
SYSTOLIC BLOOD PRESSURE: 112 MMHG | WEIGHT: 205 LBS | RESPIRATION RATE: 16 BRPM | BODY MASS INDEX: 34.64 KG/M2 | HEART RATE: 88 BPM | DIASTOLIC BLOOD PRESSURE: 72 MMHG | TEMPERATURE: 97.8 F

## 2021-01-19 DIAGNOSIS — Z82.49 FAMILY HISTORY OF BLOOD CLOTS: ICD-10-CM

## 2021-01-19 DIAGNOSIS — M25.562 ACUTE PAIN OF LEFT KNEE: ICD-10-CM

## 2021-01-19 DIAGNOSIS — M79.662 PAIN OF LEFT CALF: ICD-10-CM

## 2021-01-19 DIAGNOSIS — S86.912A KNEE STRAIN, LEFT, INITIAL ENCOUNTER: ICD-10-CM

## 2021-01-19 DIAGNOSIS — K59.01 SLOW TRANSIT CONSTIPATION: Primary | ICD-10-CM

## 2021-01-19 PROCEDURE — 99214 OFFICE O/P EST MOD 30 MIN: CPT | Performed by: PHYSICIAN ASSISTANT

## 2021-01-19 PROCEDURE — G0463 HOSPITAL OUTPT CLINIC VISIT: HCPCS | Mod: 25

## 2021-01-19 PROCEDURE — 73564 X-RAY EXAM KNEE 4 OR MORE: CPT | Mod: LT

## 2021-01-19 PROCEDURE — 73560 X-RAY EXAM OF KNEE 1 OR 2: CPT | Mod: RT

## 2021-01-19 PROCEDURE — 93971 EXTREMITY STUDY: CPT | Mod: LT

## 2021-01-19 PROCEDURE — G0463 HOSPITAL OUTPT CLINIC VISIT: HCPCS

## 2021-01-19 RX ORDER — POLYETHYLENE GLYCOL 3350 17 G/17G
1-2 POWDER, FOR SOLUTION ORAL 2 TIMES DAILY PRN
Qty: 500 G | Refills: 11 | Status: SHIPPED | OUTPATIENT
Start: 2021-01-19 | End: 2021-06-01

## 2021-01-19 ASSESSMENT — PAIN SCALES - GENERAL: PAINLEVEL: MODERATE PAIN (5)

## 2021-01-19 NOTE — PROGRESS NOTES
Nursing Notes:   Laura Ribera LPN  1/19/2021 10:34 AM  Signed  Chief Complaint   Patient presents with     Leg Pain     Left Calf     Constipation         Medication Reconciliation: complete    Laura Ribera LPN        HPI:    Suzi Miller is a 35 year old female who presents for multiple concerns.  Patient previously had a UTI.  She ended up taking ciprofloxacin for treatment.  The symptoms have currently resolved.  She was using Zofran as needed for the nausea.  Unfortunately she tends to get constipated with the nausea.  Has been taking stool softeners.  She also got a suppository over-the-counter on 1/15/2021.  She noted taking 1 on 1/15 and 1/16.  Unfortunately this did not help alleviate her constipation.  She tried an enema yesterday with minimal results.  Her last good bowel movement was on 1/10.  She is currently staying well-hydrated and eating yogurt.  Her menstrual cycle started yesterday.  This has caused her to have some back cramps.  She noted a little blood in her stool after completing the enema.  Has been currently taking the stool softeners daily.  No dehydration concerns.  No belly pain, nausea, vomiting.  No fevers or chills.    Over the last 3 weeks she has noticed some left posterior knee and left proximal posterior calf pain.  Feels like symptoms are getting worse.  No known injury or trauma.  The pain feels deep.  Notices increased pain with extreme flexion and extension of the left knee.  Has spider veins however these are stable.  No bruising or swelling appreciated. Family history of blood clots.       Past Medical History:   Diagnosis Date     Endometriosis      Hashimoto's thyroiditis        Past Surgical History:   Procedure Laterality Date     C/SECTION, LOW TRANSVERSE       CLOSED RX BIG TOE FRACTURE  2009    hardware     EXCISE NODULE THYROID       LEEP TX, CERVICAL  2003       History reviewed. No pertinent family history.    Social History     Tobacco Use      Smoking status: Never Smoker     Smokeless tobacco: Never Used   Substance Use Topics     Alcohol use: Yes     Comment: rare       Current Outpatient Medications   Medication Sig Dispense Refill     APAP-Parabrom-Pyrilamine (PAMPRIN MULTI-SYMPTOM) 500-25-15 MG TABS Take 2 tablets by mouth At Bedtime       magnesium citrate solution Take 296 mLs by mouth daily as needed for constipation 296 mL 3     melatonin 3 MG CAPS Take 1 capsule by mouth At Bedtime       ondansetron (ZOFRAN-ODT) 4 MG ODT tab Take 1 tablet (4 mg) by mouth every 8 hours as needed for nausea or vomiting 20 tablet 0     polyethylene glycol (MIRALAX) 17 GM/Dose powder Take 17-34 g (1-2 capfuls) by mouth 2 times daily as needed for constipation 500 g 11     UNABLE TO FIND MEDICATION NAME: Protandim         Allergies   Allergen Reactions     Penicillins Anaphylaxis       REVIEW OF SYSTEMS:  Refer to HPI.    EXAM:   Vitals:    /72 (BP Location: Right arm, Patient Position: Sitting, Cuff Size: Adult Regular)   Pulse 88   Temp 97.8  F (36.6  C)   Resp 16   Wt 93 kg (205 lb)   LMP 12/24/2020 (Approximate)   BMI 34.64 kg/m      General Appearance: Pleasant, alert, appropriate appearance for age. No acute distress  Chest/Respiratory Exam: Normal chest wall and respirations. Clear to auscultation.  Cardiovascular Exam: Regular rate and rhythm. S1, S2, no murmur, click, gallop, or rubs.  Gastrointestinal Exam: Soft, non-tender, no masses or organomegaly. Normal BS x 4.  Musculoskeletal Exam: Pain with palpation of the left posterior knee along with the left posterior proximal calf.  No bruising or swelling appreciated.  Minimal left lower extremity edema appreciated.  Positive Homans' sign on the left side.  Foot Exam: Left and right foot: good pedal pulses, no lesions, nail hygiene good.  Skin: no rash or abnormalities  Neurologic Exam: Nonfocal, normal gross motor, tone coordination and no tremor.  Psychiatric Exam: Alert and oriented -  appropriate affect.    PHQ Depression Screen  No flowsheet data found.    Imaging:   Results for orders placed or performed during the hospital encounter of 01/19/21   XR Knee Standing 2v  Bilateral & 2v Left     Status: None    Narrative    PROCEDURE: XR KNEE STANDING 2 V  BILATERAL AND 2 V LEFT 1/19/2021  12:16 PM    HISTORY: Acute pain of left knee    COMPARISONS: None.    TECHNIQUE: 4 views.    FINDINGS: There is moderate narrowing of medial joint spaces  bilaterally with small medial osteophytes on the left. There is mild  narrowing of the patellofemoral joint.    No fracture or dislocation is seen. There is no joint effusion or  focal bone lesion.         Impression    IMPRESSION: Degenerative change without acute abnormality.    LORELEI ROMERO MD   Results for orders placed or performed during the hospital encounter of 01/19/21   US Lower Extremity Venous Duplex Left     Status: None    Narrative    Procedure: US LOWER EXTREMITY VENOUS DUPLEX LEFT    HISTORY:  Hand weakness; Bilateral arm pain; Bilateral arm pain;  Bilateral leg pain; Bilateral leg pain; Difficulty walking.    TECHNIQUE: Grayscale, color Doppler and compression views of the deep  venous structures of the lower extremity.    COMPARISON: None.    FINDINGS:    Interrogation of the deep venous structures from the left common  femoral vein through the veins of the proximal calf demonstrate normal  grayscale appearance, compressibility and augmentable color Doppler  flow.      Impression    IMPRESSION:     No evidence of left lower extremity DVT.      EDIE SANCHEZ MD      ASSESSMENT AND PLAN:      ICD-10-CM    1. Slow transit constipation  K59.01 polyethylene glycol (MIRALAX) 17 GM/Dose powder     magnesium citrate solution   2. Knee strain, left, initial encounter  S86.912A    3. Acute pain of left knee  M25.562 US Lower Extremity Venous Duplex Left     XR Knee Standing 2v  Bilateral & 2v Left     PHYSICAL THERAPY REFERRAL   4. Family  history of blood clots  Z82.49 US Lower Extremity Venous Duplex Left   5. Pain of left calf  M79.662 US Lower Extremity Venous Duplex Left       Constipation -patient was given a prescription for MiraLAX and magnesium citrate to use as needed for constipation.  Gave side effect profile.  Encouraged increase of water and apple, pear and prune juice to decrease symptoms and discomfort.  Use age appropriate over the counter medications such as stool softeners or miralax for constipation relief.  Encouraged soft stools. Return to clinic with change/worsening of symptoms.     Knee pain:   Completed left knee xray.  I personally reviewed the xray. I found no fracture appreciated upon initial read of xray.  Final read pending by radiology.    Completed left lower extremity ultrasound to rule out blood clot concerns.  Lower extremity ultrasound was unremarkable.    Encouraged to take ibuprofen (400-800 mg) for relief up to 4 times per day.  Encouraged rest, stretching and elevation.  Encouraged to use ice or heat 15 minutes at a time several times per day to decrease pain. Return to clinic in 1-2 weeks as necessary for persistent pain. Return to clinic with any change or worsening of symptoms.   Referred to physical therapy for consult.  May need to complete knee MRI and Ortho referral if symptoms are not calming down or worsening in the future.     30 minutes spent on the date of the encounter doing chart review, history and exam, documentation and further activities as noted above      Patient Instructions   Constipation - Encouraged increase of water and apple, pear and prune juice to decrease symptoms and discomfort.  Use age appropriate over the counter medications such as stool softeners or miralax for constipation relief.  Encouraged soft stools. Return to clinic with change/worsening of symptoms.     Knee pain:   Encouraged to take ibuprofen (400-800 mg) for relief up to 4 times per day.  Encouraged rest, stretching  and elevation.  Encouraged to use ice or heat 15 minutes at a time several times per day to decrease pain. Return to clinic in 1-2 weeks as necessary for persistent pain. Return to clinic with any change or worsening of symptoms.          Maryanne Ayala PA-C PA-C..................1/19/2021 10:32 AM

## 2021-01-19 NOTE — NURSING NOTE
Chief Complaint   Patient presents with     Leg Pain     Left Calf     Constipation         Medication Reconciliation: complete    Laura Ribera, LPN

## 2021-01-19 NOTE — PATIENT INSTRUCTIONS
Constipation - Encouraged increase of water and apple, pear and prune juice to decrease symptoms and discomfort.  Use age appropriate over the counter medications such as stool softeners or miralax for constipation relief.  Encouraged soft stools. Return to clinic with change/worsening of symptoms.     Knee pain:   Encouraged to take ibuprofen (400-800 mg) for relief up to 4 times per day.  Encouraged rest, stretching and elevation.  Encouraged to use ice or heat 15 minutes at a time several times per day to decrease pain. Return to clinic in 1-2 weeks as necessary for persistent pain. Return to clinic with any change or worsening of symptoms.

## 2021-01-26 NOTE — PROGRESS NOTES
Nursing Notes:   Tory Peres LPN  1/27/2021 10:50 AM  Sign at exiting of workspace  Patient presents to clinic with concerns about odorous, frequency, and urgency with urination and vaginal discomfort  Tory Peres LPN ....................  1/27/2021   10:47 AM      SUBJECTIVE:     HPI  Suzi Miller is a 35 year old female who presents to clinic today for evaluation of urinary concerns. Seen for similar symptoms on 01/11 where UA and culture were negative but treated with Cipro anyway. Noticed improvement with antibiotic treatment but then symptoms returned. Noticing dysuria, urinary frequency and urgency, left flank pain, odorous urine- smells like ammonia to her. Has been staying well hydrated and consuming probiotics. Has some associated nausea. Was previously treated with Zofran but that caused constipation so patient does not want to try that again. No associated fever/chills, hematuria,, abdominal pain, vomiting, diarrhea, constipation, vaginal symptoms. No pregnancy or STD concerns.       Review of Systems   Per HPI.     PAST MEDICAL HISTORY:   Past Medical History:   Diagnosis Date     Endometriosis      Hashimoto's thyroiditis        PAST SURGICAL HISTORY:   Past Surgical History:   Procedure Laterality Date     C/SECTION, LOW TRANSVERSE       CLOSED RX BIG TOE FRACTURE  2009    hardware     EXCISE NODULE THYROID       LEEP TX, CERVICAL  2003       FAMILY HISTORY: History reviewed. No pertinent family history.    SOCIAL HISTORY:   Social History     Tobacco Use     Smoking status: Never Smoker     Smokeless tobacco: Never Used   Substance Use Topics     Alcohol use: Yes     Comment: rare        Allergies   Allergen Reactions     Penicillins Anaphylaxis     Current Outpatient Medications   Medication     APAP-Parabrom-Pyrilamine (PAMPRIN MULTI-SYMPTOM) 500-25-15 MG TABS     magnesium citrate solution     melatonin 3 MG CAPS     ondansetron (ZOFRAN-ODT) 4 MG ODT tab     polyethylene glycol  "(MIRALAX) 17 GM/Dose powder     UNABLE TO FIND     No current facility-administered medications for this visit.        OBJECTIVE:     /76   Pulse 93   Temp 97.9  F (36.6  C)   Resp 12   Ht 1.638 m (5' 4.5\")   Wt 92.8 kg (204 lb 9.6 oz)   LMP 01/18/2021   SpO2 99%   Breastfeeding No   BMI 34.58 kg/m    Body mass index is 34.58 kg/m .  Physical Exam  General: Pleasant, in no apparent distress.   Respiratory: Lungs are resonant and clear to auscultation bilaterally. No wheezes, crackles, or rhonchi.  Abdomen: Abdomen is non-distended and without bulging flanks, prominent venous markings, or ecchymosis. Active bowel sounds heard in all four quadrants. No tenderness to palpation in all four quadrants. No rebound tenderness or guarding. No palpable masses noted. No hepatosplenomegaly.  Psych: Appropriate mood and affect.      Results for orders placed or performed in visit on 01/27/21   UA reflex to Microscopic and Culture     Status: Abnormal    Specimen: Midstream Urine   Result Value Ref Range    Color Urine Yellow     Appearance Urine Clear     Glucose Urine Negative NEG^Negative mg/dL    Bilirubin Urine Negative NEG^Negative    Ketones Urine Negative NEG^Negative mg/dL    Specific Gravity Urine 1.033 1.003 - 1.035    Blood Urine Negative NEG^Negative    pH Urine 5.5 5.0 - 7.0 pH    Protein Albumin Urine 10 (A) NEG^Negative mg/dL    Urobilinogen mg/dL Normal 0.0 - 2.0 mg/dL    Nitrite Urine Negative NEG^Negative    Leukocyte Esterase Urine Negative NEG^Negative    Source Midstream Urine     RBC Urine 1 0 - 2 /HPF    WBC Urine 1 0 - 5 /HPF    Squamous Epithelial /HPF Urine 4 (H) 0 - 1 /HPF    Mucous Urine Present (A) NEG^Negative /LPF   Wet Prep, Genital     Status: None    Specimen: Vagina   Result Value Ref Range    Specimen Description Vagina     Wet Prep No yeast seen     Wet Prep No Trichomonas seen     Wet Prep No clue cells seen          ASSESSMENT/PLAN:     1. Urinary problem    2. Vaginal pain  "   3. UTI symptoms      Differential includes UTI, urethritis, vaginal infection, STD, other urinary or abdominal cause, etc. UA negative again, urine culture pending. Wet prep also negative. Discussed options with patient including retreating with antibiotic for presumed infection, imaging for additional evaluation, or urology referral given two negative UA's with persistent lower urinary tract symptoms. She chose referral at this time. Order placed. Follow up as needed.       Vonda Goodson PA-C  [facility]

## 2021-01-27 ENCOUNTER — OFFICE VISIT (OUTPATIENT)
Dept: FAMILY MEDICINE | Facility: OTHER | Age: 36
End: 2021-01-27
Attending: PHYSICIAN ASSISTANT
Payer: COMMERCIAL

## 2021-01-27 VITALS
OXYGEN SATURATION: 99 % | BODY MASS INDEX: 34.09 KG/M2 | HEART RATE: 93 BPM | HEIGHT: 65 IN | TEMPERATURE: 97.9 F | SYSTOLIC BLOOD PRESSURE: 128 MMHG | RESPIRATION RATE: 12 BRPM | DIASTOLIC BLOOD PRESSURE: 76 MMHG | WEIGHT: 204.6 LBS

## 2021-01-27 DIAGNOSIS — R39.89 URINARY PROBLEM: Primary | ICD-10-CM

## 2021-01-27 DIAGNOSIS — R39.9 UTI SYMPTOMS: ICD-10-CM

## 2021-01-27 DIAGNOSIS — R10.2 VAGINAL PAIN: ICD-10-CM

## 2021-01-27 LAB
ALBUMIN UR-MCNC: 10 MG/DL
APPEARANCE UR: CLEAR
BILIRUB UR QL STRIP: NEGATIVE
COLOR UR AUTO: YELLOW
GLUCOSE UR STRIP-MCNC: NEGATIVE MG/DL
HGB UR QL STRIP: NEGATIVE
KETONES UR STRIP-MCNC: NEGATIVE MG/DL
LEUKOCYTE ESTERASE UR QL STRIP: NEGATIVE
MUCOUS THREADS #/AREA URNS LPF: PRESENT /LPF
NITRATE UR QL: NEGATIVE
PH UR STRIP: 5.5 PH (ref 5–7)
RBC #/AREA URNS AUTO: 1 /HPF (ref 0–2)
SOURCE: ABNORMAL
SP GR UR STRIP: 1.03 (ref 1–1.03)
SPECIMEN SOURCE: NORMAL
SQUAMOUS #/AREA URNS AUTO: 4 /HPF (ref 0–1)
UROBILINOGEN UR STRIP-MCNC: NORMAL MG/DL (ref 0–2)
WBC #/AREA URNS AUTO: 1 /HPF (ref 0–5)
WET PREP SPEC: NORMAL

## 2021-01-27 PROCEDURE — G0463 HOSPITAL OUTPT CLINIC VISIT: HCPCS

## 2021-01-27 PROCEDURE — 87210 SMEAR WET MOUNT SALINE/INK: CPT | Mod: ZL | Performed by: PHYSICIAN ASSISTANT

## 2021-01-27 PROCEDURE — 87086 URINE CULTURE/COLONY COUNT: CPT | Mod: ZL | Performed by: PHYSICIAN ASSISTANT

## 2021-01-27 PROCEDURE — 99213 OFFICE O/P EST LOW 20 MIN: CPT | Performed by: PHYSICIAN ASSISTANT

## 2021-01-27 PROCEDURE — 81001 URINALYSIS AUTO W/SCOPE: CPT | Mod: ZL | Performed by: PHYSICIAN ASSISTANT

## 2021-01-27 ASSESSMENT — ANXIETY QUESTIONNAIRES
IF YOU CHECKED OFF ANY PROBLEMS ON THIS QUESTIONNAIRE, HOW DIFFICULT HAVE THESE PROBLEMS MADE IT FOR YOU TO DO YOUR WORK, TAKE CARE OF THINGS AT HOME, OR GET ALONG WITH OTHER PEOPLE: NOT DIFFICULT AT ALL
3. WORRYING TOO MUCH ABOUT DIFFERENT THINGS: NOT AT ALL
GAD7 TOTAL SCORE: 0
7. FEELING AFRAID AS IF SOMETHING AWFUL MIGHT HAPPEN: NOT AT ALL
6. BECOMING EASILY ANNOYED OR IRRITABLE: NOT AT ALL
2. NOT BEING ABLE TO STOP OR CONTROL WORRYING: NOT AT ALL
1. FEELING NERVOUS, ANXIOUS, OR ON EDGE: NOT AT ALL
5. BEING SO RESTLESS THAT IT IS HARD TO SIT STILL: NOT AT ALL

## 2021-01-27 ASSESSMENT — PATIENT HEALTH QUESTIONNAIRE - PHQ9
5. POOR APPETITE OR OVEREATING: NOT AT ALL
SUM OF ALL RESPONSES TO PHQ QUESTIONS 1-9: 0

## 2021-01-27 ASSESSMENT — MIFFLIN-ST. JEOR: SCORE: 1621

## 2021-01-27 NOTE — NURSING NOTE
Patient presents to clinic with concerns about odorous, frequency, and urgency with urination and vaginal discomfort  Tory Peres LPN ....................  1/27/2021   10:47 AM

## 2021-01-28 ASSESSMENT — ANXIETY QUESTIONNAIRES: GAD7 TOTAL SCORE: 0

## 2021-01-29 LAB
BACTERIA SPEC CULT: NORMAL
SPECIMEN SOURCE: NORMAL

## 2021-02-03 ENCOUNTER — OFFICE VISIT (OUTPATIENT)
Dept: FAMILY MEDICINE | Facility: OTHER | Age: 36
End: 2021-02-03
Attending: FAMILY MEDICINE
Payer: COMMERCIAL

## 2021-02-03 ENCOUNTER — HOSPITAL ENCOUNTER (OUTPATIENT)
Dept: CT IMAGING | Facility: OTHER | Age: 36
End: 2021-02-03
Attending: UROLOGY
Payer: COMMERCIAL

## 2021-02-03 ENCOUNTER — OFFICE VISIT (OUTPATIENT)
Dept: UROLOGY | Facility: OTHER | Age: 36
End: 2021-02-03
Attending: PHYSICIAN ASSISTANT
Payer: COMMERCIAL

## 2021-02-03 VITALS
SYSTOLIC BLOOD PRESSURE: 112 MMHG | RESPIRATION RATE: 16 BRPM | BODY MASS INDEX: 34.75 KG/M2 | HEART RATE: 88 BPM | WEIGHT: 205.6 LBS | DIASTOLIC BLOOD PRESSURE: 88 MMHG

## 2021-02-03 VITALS
OXYGEN SATURATION: 99 % | HEART RATE: 88 BPM | RESPIRATION RATE: 16 BRPM | WEIGHT: 205 LBS | SYSTOLIC BLOOD PRESSURE: 112 MMHG | TEMPERATURE: 97.5 F | DIASTOLIC BLOOD PRESSURE: 88 MMHG | BODY MASS INDEX: 34.64 KG/M2

## 2021-02-03 DIAGNOSIS — R10.9 FLANK PAIN: Primary | ICD-10-CM

## 2021-02-03 DIAGNOSIS — R39.9 UTI SYMPTOMS: ICD-10-CM

## 2021-02-03 DIAGNOSIS — Z87.42 HISTORY OF ENDOMETRIOSIS: ICD-10-CM

## 2021-02-03 DIAGNOSIS — R10.9 FLANK PAIN: ICD-10-CM

## 2021-02-03 DIAGNOSIS — R10.2 PELVIC PAIN IN FEMALE: Primary | ICD-10-CM

## 2021-02-03 PROBLEM — E06.3 HASHIMOTO'S DISEASE: Status: ACTIVE | Noted: 2019-02-28

## 2021-02-03 PROCEDURE — 99204 OFFICE O/P NEW MOD 45 MIN: CPT | Performed by: UROLOGY

## 2021-02-03 PROCEDURE — G0463 HOSPITAL OUTPT CLINIC VISIT: HCPCS | Mod: 25

## 2021-02-03 PROCEDURE — 99213 OFFICE O/P EST LOW 20 MIN: CPT | Performed by: FAMILY MEDICINE

## 2021-02-03 PROCEDURE — 74176 CT ABD & PELVIS W/O CONTRAST: CPT

## 2021-02-03 PROCEDURE — G0463 HOSPITAL OUTPT CLINIC VISIT: HCPCS | Mod: 25,27

## 2021-02-03 PROCEDURE — 51798 US URINE CAPACITY MEASURE: CPT | Performed by: UROLOGY

## 2021-02-03 ASSESSMENT — PAIN SCALES - GENERAL
PAINLEVEL: SEVERE PAIN (6)
PAINLEVEL: MODERATE PAIN (4)

## 2021-02-03 NOTE — PROGRESS NOTES
Nursing Notes:   Jordana Shine, LPN  2/3/2021 11:18 AM  Sign at exiting of workspace  Patient presents to the clinic for pain on the left side of the abdomin and low back for the past month.       Assessment & Plan       ICD-10-CM    1. Pelvic pain in female  R10.2 OB/GYN REFERRAL   2. History of endometriosis  Z87.42 OB/GYN REFERRAL     Reviewed Dr. Leiva's note.  Reviewed CT results.  Based on her history, endometriosis is very likely.  She has a history of this and has association of the abdominal pain with menses.  Worsening over time.    However, she has not had a recent Pap smear.  She did have negative UA and wet prep, but did not have gonorrhea or chlamydia testing.  We discussed obtaining Pap along with GC and chlamydia today, but she needed to leave to get to school on time.    Referral placed to OB/GYN for endometriosis discussion.  Deferred possible pelvic ultrasound, Pap, and GC chlamydia to that appointment.    Mendel Kwon MD   [facility]      SUBJECTIVE:  35 year old female presents for bilateral lower abdominal pain radiating to both flanks. Left worse than right.  Nothing clearly makes it better  Pain is not associated with any specific foods  Worse during recent period. This pain started a week prior to period January 16  Typically has heavy menses with spotting in between  History of endometriosis, managed on Depo until pregnancy  She had 2 negative UA and negative wet prep recently. CT scan today without abnormalities  Reports that history of a LEEP when she was younger, has not had recent Pap smears.  Reports not being good about coming into the doctor and keeping up with preventative cares  , no concerns for STDs      REVIEW OF SYSTEMS:    No fever    Current Outpatient Medications   Medication Sig Dispense Refill     APAP-Parabrom-Pyrilamine (PAMPRIN MULTI-SYMPTOM) 500-25-15 MG TABS Take 2 tablets by mouth At Bedtime       melatonin 3 MG CAPS Take 1 capsule  by mouth At Bedtime       ondansetron (ZOFRAN-ODT) 4 MG ODT tab Take 1 tablet (4 mg) by mouth every 8 hours as needed for nausea or vomiting 20 tablet 0     polyethylene glycol (MIRALAX) 17 GM/Dose powder Take 17-34 g (1-2 capfuls) by mouth 2 times daily as needed for constipation 500 g 11     UNABLE TO FIND MEDICATION NAME: Protandim       Allergies   Allergen Reactions     Penicillins Anaphylaxis       OBJECTIVE:  LMP 01/18/2021     EXAM:  General Appearance: Alert. No acute distress  Gastrointestinal Exam: Soft, tender diffusely, most in lower abdomen. No abnormal masses or organomegaly.  Psychiatric: Normal affect and mentation      Results for orders placed or performed during the hospital encounter of 02/03/21   CT Abdomen Pelvis w/o Contrast     Status: None    Narrative    EXAMINATION: CT ABDOMEN PELVIS W/O CONTRAST, 2/3/2021 9:57 AM    TECHNIQUE:  Helical CT images from the lung bases through the  symphysis pubis were obtained  without IV contrast. Contrast dose:    COMPARISON: none    HISTORY: history of stones, lower pelvic and flank pain with nausea;  UTI symptoms; Flank pain    FINDINGS:    There is dependent atelectasis at the lung bases.    The liver is free of masses or biliary ductal enlargement. No  calcified gallstones are seen.    The the spleen and pancreas appear normal.    The adrenal glands are normal.    The right and left kidneys are free of masses or hydronephrosis. There  are no renal or ureteric calculi.    The periaortic lymph nodes are normal in caliber.    No intraperitoneal masses or inflammatory changes are noted. The  appendix is normal.    In the pelvis the bladder and rectum appear normal. The uterus and  ovaries appear normal.    The regional skeleton is intact      Impression    IMPRESSION: Negative CT scan of the abdomen and pelvis. No renal or  ureteric calculi are seen.     VALENTIN YIN MD          This document was prepared using a combination of typing and voice  generated software.  While every attempt was made for accuracy, spelling and grammatical errors may exist.

## 2021-02-03 NOTE — NURSING NOTE
Pt presents to clinic for UTI symptoms consult    Review of Systems:    Weight loss:    No     Recent fever/chills:  No   Night sweats:   No  Current skin rash:  Yes   Recent hair loss:  No  Heat intolerance:  No   Cold intolerance:  Yes  Chest pain:   No   Palpitations:   No  Shortness of breath:  No   Wheezing:   No  Constipation:    No   Diarrhea:   No   Nausea:   Yes   Vomiting:   No   Kidney/side pain:  Yes   Back pain:   Yes  Frequent headaches:  Yes   Dizziness:     No  Leg swelling:   Yes   Calf pain:    No    Parents, brothers or sisters with history of kidney cancer:   No  Parents, brothers or sisters with history of bladder cancer: No  Father or brother with history of prostate cancer:  No    Post-Void Residual  A post-void residual was measured by ultrasonic bladder scanner.  0 mL

## 2021-02-03 NOTE — NURSING NOTE
"Patient presents to the clinic for pain on the left side of the abdomin and low back for the past month.      Chief Complaint   Patient presents with     Abdominal Pain       Initial /88 (BP Location: Right arm, Patient Position: Sitting, Cuff Size: Adult Regular)   Pulse 88   Temp 97.5  F (36.4  C) (Temporal)   Resp 16   Wt 93 kg (205 lb)   LMP 01/18/2021   SpO2 99%   BMI 34.64 kg/m   Estimated body mass index is 34.64 kg/m  as calculated from the following:    Height as of 1/27/21: 1.638 m (5' 4.5\").    Weight as of this encounter: 93 kg (205 lb).  Medication Reconciliation: complete    Jordana Shine LPN    "

## 2021-02-03 NOTE — PROGRESS NOTES
Type of Visit  NPV    Chief Complaint  Pelvic pain    HPI  Ms. Miller is a 35 year old female who presents with unexplained pelvic pain.  Started 3 weeks ago.  Pain is daily but fluctuates in intensity from 5/10 to 10/10.  Location is bilateral but L>R.  Has recently undergone UA and urine culture which was negative.  Patient has a history of stones.  She has not experienced pain quite like this previously.  She has passed multiple stones in the past.  She denies gross hematuria or dysuria today.      Past Medical History  She  has a past medical history of Endometriosis and Hashimoto's thyroiditis.  There is no problem list on file for this patient.      Past Surgical History  She  has a past surgical history that includes Excise Nodule Thyroid; c/section, low transverse; leep tx, cervical (2003); and closed rx big toe fracture (2009).    Medications  She has a current medication list which includes the following prescription(s): apap-parabrom-pyrilamine, magnesium citrate, melatonin, ondansetron, polyethylene glycol, and UNABLE TO FIND.    Allergies  Allergies   Allergen Reactions     Penicillins Anaphylaxis       Social History  She  reports that she has never smoked. She has never used smokeless tobacco. She reports current alcohol use. She reports that she does not use drugs.  No drug abuse.    Family History  History reviewed. No pertinent family history.    Review of Systems  I personally reviewed the ROS with the patient.    Nursing Notes:   Josephine Cid LPN  2/3/2021  9:40 AM  Signed  Pt presents to clinic for UTI symptoms consult    Review of Systems:    Weight loss:    No     Recent fever/chills:  No   Night sweats:   No  Current skin rash:  Yes   Recent hair loss:  No  Heat intolerance:  No   Cold intolerance:  Yes  Chest pain:   No   Palpitations:   No  Shortness of breath:  No   Wheezing:   No  Constipation:    No   Diarrhea:   No   Nausea:   Yes   Vomiting:   No   Kidney/side pain:  Yes   Back  pain:   Yes  Frequent headaches:  Yes   Dizziness:     No  Leg swelling:   Yes   Calf pain:    No    Parents, brothers or sisters with history of kidney cancer:   No  Parents, brothers or sisters with history of bladder cancer: No  Father or brother with history of prostate cancer:  No    Post-Void Residual  A post-void residual was measured by ultrasonic bladder scanner.  0 mL          Physical Exam  Vitals:    02/03/21 0927   BP: 112/88   BP Location: Right arm   Patient Position: Sitting   Cuff Size: Adult Regular   Pulse: 88   Resp: 16   Weight: 93.3 kg (205 lb 9.6 oz)     Constitutional: NAD, WDWN  Head: NCAT  Eyes: Conjunctivae normal  Cardiovascular: Regular rate  Pulmonary/Chest: Respirations are even and non-labored bilaterally  Abdominal: Soft with no distension, tenderness, masses, guarding or CVA tenderness  Neurological: A + O x 3,  cranial nerves II-XII grossly intact  Extremities: JEAN CARLOS x 4, warm, no clubbing, no cyanosis  Skin: Pink, warm, dry with no rash  Psychiatric:  Normal mood and affect  Genitourinary: nonpalpable bladder    Labs  Results for ODILON GUADARRAMA (MRN [id number]) as of 2/3/2021 09:40   1/27/2021 10:46 1/27/2021 10:47   Color Urine Yellow    Appearance Urine Clear    Glucose Urine Negative    Bilirubin Urine Negative    Ketones Urine Negative    Specific Gravity Urine 1.033    pH Urine 5.5    Protein Albumin Urine 10 (A)    Urobilinogen mg/dL Normal    Nitrite Urine Negative    Blood Urine Negative    Leukocyte Esterase Urine Negative    Source Midstream Urine    WBC Urine 1    RBC Urine 1    Squamous Epithelial /HPF Urine 4 (H)    Mucous Urine Present (A)    Specimen Description  Unspecified Urine   Culture Micro  Urine culture negative (no growth of uropathogens).     Imaging  CT Stone  2/3/2021  MPRESSION: Negative CT scan of the abdomen and pelvis. No renal or  ureteric calculi are seen.     Post-Void Residual  A post-void residual was measured by ultrasonic bladder scanner.  0 mL  today    Assessment & Plan  Ms. Miller is a 35 year old female with unexplained pelvic pain.  Reviewed her urinalysis and culture, PVR which was collected today and CT scan which was ordered today.  All of this information has returned WNL.  I explained to her that I do not believe her pain source is urologic.  No additional testing is needed or required to evaluate urologic issues.  I have recommended follow-up with PCP to consider a more broad differential.

## 2021-02-04 ENCOUNTER — OFFICE VISIT (OUTPATIENT)
Dept: OBGYN | Facility: OTHER | Age: 36
End: 2021-02-04
Attending: FAMILY MEDICINE
Payer: COMMERCIAL

## 2021-02-04 VITALS
HEART RATE: 86 BPM | DIASTOLIC BLOOD PRESSURE: 80 MMHG | BODY MASS INDEX: 34.32 KG/M2 | SYSTOLIC BLOOD PRESSURE: 126 MMHG | WEIGHT: 203.1 LBS

## 2021-02-04 DIAGNOSIS — B96.89 BV (BACTERIAL VAGINOSIS): ICD-10-CM

## 2021-02-04 DIAGNOSIS — Z87.42 HISTORY OF ENDOMETRIOSIS: ICD-10-CM

## 2021-02-04 DIAGNOSIS — N93.9 ABNORMAL UTERINE BLEEDING: ICD-10-CM

## 2021-02-04 DIAGNOSIS — N76.0 BV (BACTERIAL VAGINOSIS): ICD-10-CM

## 2021-02-04 DIAGNOSIS — R10.2 PELVIC PAIN IN FEMALE: Primary | ICD-10-CM

## 2021-02-04 LAB
HCG UR QL: NEGATIVE
SPECIMEN SOURCE: ABNORMAL
WET PREP SPEC: ABNORMAL

## 2021-02-04 PROCEDURE — 96372 THER/PROPH/DIAG INJ SC/IM: CPT | Mod: XU | Performed by: STUDENT IN AN ORGANIZED HEALTH CARE EDUCATION/TRAINING PROGRAM

## 2021-02-04 PROCEDURE — G0463 HOSPITAL OUTPT CLINIC VISIT: HCPCS

## 2021-02-04 PROCEDURE — 58100 BIOPSY OF UTERUS LINING: CPT | Performed by: STUDENT IN AN ORGANIZED HEALTH CARE EDUCATION/TRAINING PROGRAM

## 2021-02-04 PROCEDURE — 250N000011 HC RX IP 250 OP 636: Performed by: STUDENT IN AN ORGANIZED HEALTH CARE EDUCATION/TRAINING PROGRAM

## 2021-02-04 PROCEDURE — G0123 SCREEN CERV/VAG THIN LAYER: HCPCS | Performed by: STUDENT IN AN ORGANIZED HEALTH CARE EDUCATION/TRAINING PROGRAM

## 2021-02-04 PROCEDURE — 87624 HPV HI-RISK TYP POOLED RSLT: CPT | Mod: ZL | Performed by: STUDENT IN AN ORGANIZED HEALTH CARE EDUCATION/TRAINING PROGRAM

## 2021-02-04 PROCEDURE — 88305 TISSUE EXAM BY PATHOLOGIST: CPT

## 2021-02-04 PROCEDURE — 99204 OFFICE O/P NEW MOD 45 MIN: CPT | Mod: 25 | Performed by: STUDENT IN AN ORGANIZED HEALTH CARE EDUCATION/TRAINING PROGRAM

## 2021-02-04 PROCEDURE — 88142 CYTOPATH C/V THIN LAYER: CPT | Performed by: STUDENT IN AN ORGANIZED HEALTH CARE EDUCATION/TRAINING PROGRAM

## 2021-02-04 PROCEDURE — 87210 SMEAR WET MOUNT SALINE/INK: CPT | Mod: ZL | Performed by: STUDENT IN AN ORGANIZED HEALTH CARE EDUCATION/TRAINING PROGRAM

## 2021-02-04 PROCEDURE — G0463 HOSPITAL OUTPT CLINIC VISIT: HCPCS | Mod: 25

## 2021-02-04 PROCEDURE — 81025 URINE PREGNANCY TEST: CPT | Mod: ZL | Performed by: STUDENT IN AN ORGANIZED HEALTH CARE EDUCATION/TRAINING PROGRAM

## 2021-02-04 RX ORDER — METRONIDAZOLE 500 MG/1
500 TABLET ORAL 2 TIMES DAILY
Qty: 14 TABLET | Refills: 0 | Status: SHIPPED | OUTPATIENT
Start: 2021-02-04 | End: 2021-02-11

## 2021-02-04 RX ORDER — MEDROXYPROGESTERONE ACETATE 150 MG/ML
150 INJECTION, SUSPENSION INTRAMUSCULAR
Qty: 1 ML | Refills: 4 | Status: CANCELLED | OUTPATIENT
Start: 2021-02-04

## 2021-02-04 RX ORDER — MEDROXYPROGESTERONE ACETATE 150 MG/ML
150 INJECTION, SUSPENSION INTRAMUSCULAR ONCE
Status: COMPLETED | OUTPATIENT
Start: 2021-02-04 | End: 2021-02-04

## 2021-02-04 RX ADMIN — MEDROXYPROGESTERONE ACETATE 150 MG: 150 INJECTION, SUSPENSION INTRAMUSCULAR at 11:47

## 2021-02-04 NOTE — PROGRESS NOTES
Gynecology Office Visit    Chief Complaint: Heavy menstrual bleeding, history of endometriosis    HPI:    Suzi Miller is a 35 year old , here for discussion of heavy menstrual bleeding.    About 5 weeks ago she felt as though she had a UTI. She started on antibiotics and zofran. This caused constipation and she was unable to have a bowel movement for 12 days- she needed to use magnesium citrate. The next week she noticed she had more urinary urgency and some pressure along her bladder. She saw Dr. Leiva yesterday. CT showed no urinary abnormalities.    All of these symptoms started a week before her last period. She has a history of endometriosis- this as diagnosed when she as 15 years old when her periods started. This was not biopsy proven, but symptom suggested and started treatment with DepoProvera. This worked well to eliminate her periods at that time. This past period was the heaviest it has ever been and with significant cramping. She notes she used a super plus tampon in less than an hour. It lasted for 5-6 days. Her periods usually come every 28-30 days. Her last menstrual period was 2 weeks ago.     She has history of ocular migraines which have not occured. In the past she was using OCPs but notes this did not help her symptoms. She also endorses that these migraines have an aura.     Other gyn history, she had a LEEP procedure when she was 17 years old. She notes her pap smears were normal after that but has not followed up recently and is unsure when her last one was.       OBHx  OB History    Para Term  AB Living   3 3 3 0 0 3   SAB TAB Ectopic Multiple Live Births   0 0 0 0 0      # Outcome Date GA Lbr Talon/2nd Weight Sex Delivery Anes PTL Lv   3 Term 12    M CS-Unspec      2 Term 09    M CS-Unspec      1 Term 05    M CS-Unspec        : emergent CS for fetal malpresentation, PPROM and FHT changes  G2: Planned RCS  G3: Planned RCS, tubal at that  time    All three were C-sections    GYN history:   No history of STIs  Last pap smear: unknown: Yes- LEEP when she was 17, follow up paps normal but has not had follow up  Declines HPV vaccine series    Past medical history:  Past Medical History:   Diagnosis Date     Endometriosis      Hashimoto's thyroiditis      Specifically denies VTE, DM, HTN or bleeding disorders    Past Surgical History:  Past Surgical History:   Procedure Laterality Date     C/SECTION, LOW TRANSVERSE       CLOSED RX BIG TOE FRACTURE  2009    hardware     EXCISE NODULE THYROID       LEEP TX, CERVICAL  2003       Medications:  Current Outpatient Medications   Medication     APAP-Parabrom-Pyrilamine (PAMPRIN MULTI-SYMPTOM) 500-25-15 MG TABS     melatonin 3 MG CAPS     ondansetron (ZOFRAN-ODT) 4 MG ODT tab     polyethylene glycol (MIRALAX) 17 GM/Dose powder     UNABLE TO FIND     No current facility-administered medications for this visit.      Allergies:    -Penicillins- anaphylaxis    Social History:  Lives at home with her  and sons. She feels safe at home.  Social History     Tobacco Use     Smoking status: Never Smoker     Smokeless tobacco: Never Used   Substance Use Topics     Alcohol use: Yes     Comment: rare     Drug use: Never     Rare/social alcohol use    Family History:  Mom- HTN, RA  Dad- HTN, Parkinson's Disease  Specifically denies breast, ovarian, colon, pancreatic cancers  Specifically denies VTE, known familial thrombophilias and coagulopathies    ROS:   Cardiovascular: negative for palpitations, chest pain, lower extremity edema and syncope or near-syncope  Gastrointestinal: negative for, nausea, vomiting and hematemesis  Genitourinary: negative for, dysuria, frequency and urgency, + heavy menstrual bleeding  Hematologic/Lymphatic/Immunologic: negative for, anemia, chills and fever        Physical Exam  /80   Pulse 86   Wt 92.1 kg (203 lb 1.6 oz)   LMP 01/18/2021   BMI 34.32 kg/m    Gen: Well-appearing, no  acute distressed, well-groomed, alert  HEENT: Normocephalic, atraumatic  Neck: Thyroid is not enlarged, no appreciable masses palpated. Non-tender. No adenopathy  Cardiovascular: Regular rate, rhythm, no murmur, no gallop. No peripheral edema, normal peripheral circulation  Pulm: Symmetric chest rise, non-labored respirations, lungs clear to auscultation without crackles or wheezing  Abd: Soft, non-tender, non-distended  Ext: No LE edema, extremities warm and well perfused  Pelvic:  Normal appearing external female genitalia. Normal hair distribution. Vagina is without lesions with moist, pink ruggae. There is a whitish vaginal discharge, wet prep sent. Cervix with no lesions, no cervical motion tenderness. Uterus is mobile, non-tender. No adnexal tenderness or masses      Endometrial Biopsy Note:   We discussed the risks, benefits and alternatives to the procedure and Ms. Miller was agreeable to proceed with the EMB. Consents were signed.  A bimanual exam was performed to reveal an anteverted uterus.  She was assisted into a lithotomy position and a speculum was gently inserted to provide visualization of the cervix.  The cervix was clearly visualized.  The cervix was cleaned with 3 Betadine swabs. The cervix was slightly dilated as she had just been bleeding and no tenaculum or os finder was needed. The biopsy cannula was then gently inserted through the already dilated cervix and advanced slowly to the uterine fundus.  The plunger was pulled and with suction applied a sample was collected on removal of the cannula in a twisting manner.  The tissue was placed in a specimen cup.  A second pass was performed in a similar manner to complete the tissue collection.       Assessment/Plan  Suzi Miller is a 35 year old  female here for heavy menstrual bleeding in the setting of suspected endometriosis.    # Heavy menstrual bleeding  - No formal pathology diagnosis of endometriosis, but noted symptom improvement in  the past with DepoProvera  - EMB collected today due to risk factor of obesity and ensuring rule out hyperplasia or malignancy  - Scheduled TVUS  - DepoProvera injection today  - Wet prep sent due to some noted discharge on exam  - follow up in 2 months to assess periods/DepoProvera toleration    We discussed that heavy menstrual bleeding can be caused by a variety of etiologies including uterine leiomyomas, adenomyosis, perimenopausal changes in cycle length and duration of bleeding, uterine polyp, endometrial hyperplasia, pre-cancerous changes or cancerous changes to the uterus. As these changes are occurring and she is has one risk factor for endometrial hyperplasia (obesity BMI 34) it is recommended to get a sampling of the endometrial lining to ensure there are none of these pre-cancerous or cancerous changes. We discussed an EMB procedure and she was in agreement to do this today. Will follow up pathology results from this.    Treatment options for heavy menstrual bleeding can range from initial conservative measures including using over the counter NSAIDS with each period to reduce bleeding and discomfort, hormonal control of each cycle with combined estrogen-progestin oral contraceptives, a long-standing hormonal control with a levonorgestrel IUD, to even more invasive techniques including an endometrial ablation or definitive management with a hysterectomy. We talked about the risks and benefits of each, and suggested trying one of the less invasive techniques first to see if this improves symptoms. She is NOT an ideal candidate for using estrogen-containing options as first line-- as she has migraine with aura.    Discussed progestin-only options including norethindrone OCPs, DepoProvera, Nexplanon and IUD for symptom control as well.  At this time she is nervous about the implant or an IUD and would like to try DepoProvera again as she had good luck with that in the past.     We discussed also getting an  ultrasound to see if there are any other findings that might be other factors leading to this change in symptoms.    Total amount of time spent during encounter including chart prep, face to face, counseling and documentation was 60 minutes. 50 minutes were during our visit, 10 dedicated to the procedure    LIANA ELISE MD on 2/4/2021 at 5:56 PM

## 2021-02-04 NOTE — NURSING NOTE
Pt presents to clinic today for consult ongoing pelvic pain x 5 weeks and pain with intercourse.      Medication Reconciliation: complete  Natalya Santos LPN

## 2021-02-05 ENCOUNTER — HOSPITAL ENCOUNTER (OUTPATIENT)
Dept: ULTRASOUND IMAGING | Facility: OTHER | Age: 36
Discharge: HOME | End: 2021-02-05
Attending: STUDENT IN AN ORGANIZED HEALTH CARE EDUCATION/TRAINING PROGRAM | Admitting: STUDENT IN AN ORGANIZED HEALTH CARE EDUCATION/TRAINING PROGRAM
Payer: COMMERCIAL

## 2021-02-05 DIAGNOSIS — Z87.42 HISTORY OF ENDOMETRIOSIS: ICD-10-CM

## 2021-02-05 PROCEDURE — 76830 TRANSVAGINAL US NON-OB: CPT

## 2021-02-08 LAB
COPATH REPORT: NORMAL
PAP: NORMAL

## 2021-02-11 LAB
FINAL DIAGNOSIS: NORMAL
HPV HR 12 DNA CVX QL NAA+PROBE: NEGATIVE
HPV16 DNA SPEC QL NAA+PROBE: NEGATIVE
HPV18 DNA SPEC QL NAA+PROBE: NEGATIVE
SPECIMEN DESCRIPTION: NORMAL
SPECIMEN SOURCE CVX/VAG CYTO: NORMAL

## 2021-02-18 ENCOUNTER — OFFICE VISIT (OUTPATIENT)
Dept: OBGYN | Facility: OTHER | Age: 36
End: 2021-02-18
Attending: STUDENT IN AN ORGANIZED HEALTH CARE EDUCATION/TRAINING PROGRAM
Payer: COMMERCIAL

## 2021-02-18 VITALS
DIASTOLIC BLOOD PRESSURE: 86 MMHG | HEART RATE: 86 BPM | BODY MASS INDEX: 34.31 KG/M2 | WEIGHT: 203 LBS | SYSTOLIC BLOOD PRESSURE: 124 MMHG

## 2021-02-18 DIAGNOSIS — B96.89 BV (BACTERIAL VAGINOSIS): Primary | ICD-10-CM

## 2021-02-18 DIAGNOSIS — N76.0 BV (BACTERIAL VAGINOSIS): Primary | ICD-10-CM

## 2021-02-18 LAB
ALBUMIN UR-MCNC: NEGATIVE MG/DL
APPEARANCE UR: CLEAR
BILIRUB UR QL STRIP: NEGATIVE
COLOR UR AUTO: YELLOW
GLUCOSE UR STRIP-MCNC: NEGATIVE MG/DL
HGB UR QL STRIP: NEGATIVE
KETONES UR STRIP-MCNC: NEGATIVE MG/DL
LEUKOCYTE ESTERASE UR QL STRIP: NEGATIVE
NITRATE UR QL: NEGATIVE
PH UR STRIP: 5 PH (ref 5–9)
SOURCE: ABNORMAL
SP GR UR STRIP: >1.03 (ref 1–1.03)
SPECIMEN SOURCE: NORMAL
UROBILINOGEN UR STRIP-ACNC: 0.2 EU/DL (ref 0.2–1)
WET PREP SPEC: NORMAL

## 2021-02-18 PROCEDURE — 99212 OFFICE O/P EST SF 10 MIN: CPT | Performed by: STUDENT IN AN ORGANIZED HEALTH CARE EDUCATION/TRAINING PROGRAM

## 2021-02-18 PROCEDURE — G0463 HOSPITAL OUTPT CLINIC VISIT: HCPCS

## 2021-02-18 PROCEDURE — 81003 URINALYSIS AUTO W/O SCOPE: CPT | Mod: ZL | Performed by: STUDENT IN AN ORGANIZED HEALTH CARE EDUCATION/TRAINING PROGRAM

## 2021-02-18 PROCEDURE — 87210 SMEAR WET MOUNT SALINE/INK: CPT | Mod: ZL | Performed by: STUDENT IN AN ORGANIZED HEALTH CARE EDUCATION/TRAINING PROGRAM

## 2021-02-18 ASSESSMENT — PAIN SCALES - GENERAL: PAINLEVEL: NO PAIN (0)

## 2021-02-18 NOTE — NURSING NOTE
Pt presents to clinic today for vaginal odor. Pt states she finished her antibiotics on 2/11/21 and the odor went away. They order came back on Monday 2/15/21. Pt denies any pain and discharge.      Medication Reconciliation: complete  Natalya Santos LPN

## 2021-02-18 NOTE — PROGRESS NOTES
Follow-Up Visit    S: Ms. Suzi Miller is a 35 year old  here for follow up after recent BV treatment. She discussed that after the first 4 days of metronidazole her symptoms resolved, but she notes that a few days after completion of the medication she noticed an odor. We discussed that pH changes can occur during the menstrual cycle and after intercourse and she notes she is just starting her period as well.     O:  /86   Pulse 86   Wt 92.1 kg (203 lb)   LMP 2021   Breastfeeding No   BMI 34.31 kg/m    Gen: Well-appearing, NAD  Pulm: nonlabored  Abd: Soft, non-tender, non-distended  Ext: No LE edema, extremities warm and well perfused    Pelvic:  Normal appearing external female genitalia. Normal hair distribution. Vagina is without lesions. No vaginal discharge. Cervix with no lesions, no cervical motion tenderness. Uterus is small, mobile, non-tender. No adnexal tenderness or masses. Mild tenderness with suprapubic palpation    A/P:  Ms. Suzi Miller is a 35 year old  here for follow up of concern of vaginal discharge. She is s/p recent metronidazole treatment completed last Thursday.    # Concern for return of BV  - Wet mount sent today  - UA sent today  - Negative exam, no CMT or vaginal discharge  - will follow up results and send treatment if any is indicated.    Total amount of time spent during today's encounter was 15 minutes.    LIANA ELISE MD on 2021 at 10:27 AM

## 2021-04-06 ENCOUNTER — OFFICE VISIT (OUTPATIENT)
Dept: OBGYN | Facility: OTHER | Age: 36
End: 2021-04-06
Attending: STUDENT IN AN ORGANIZED HEALTH CARE EDUCATION/TRAINING PROGRAM
Payer: COMMERCIAL

## 2021-04-06 VITALS
SYSTOLIC BLOOD PRESSURE: 102 MMHG | HEART RATE: 108 BPM | BODY MASS INDEX: 34.31 KG/M2 | WEIGHT: 203 LBS | DIASTOLIC BLOOD PRESSURE: 62 MMHG

## 2021-04-06 DIAGNOSIS — N93.9 ABNORMAL UTERINE BLEEDING: Primary | ICD-10-CM

## 2021-04-06 DIAGNOSIS — Z87.42 HISTORY OF ENDOMETRIOSIS: ICD-10-CM

## 2021-04-06 PROCEDURE — G0463 HOSPITAL OUTPT CLINIC VISIT: HCPCS | Performed by: STUDENT IN AN ORGANIZED HEALTH CARE EDUCATION/TRAINING PROGRAM

## 2021-04-06 PROCEDURE — 99213 OFFICE O/P EST LOW 20 MIN: CPT | Performed by: STUDENT IN AN ORGANIZED HEALTH CARE EDUCATION/TRAINING PROGRAM

## 2021-04-06 ASSESSMENT — PAIN SCALES - GENERAL: PAINLEVEL: MILD PAIN (2)

## 2021-04-06 NOTE — PROGRESS NOTES
Follow-Up Visit    S: Ms. Suzi Miller is a 35 year old  here for follow up of heavy menstrual bleeding. When we first met a few months ago we did an EMB which resulted in benign pathology and US which showed a normal sized uterus, normal endometrial thickness. There is a possible small fibroid under her  scar. We started DepoProvera.  After her DepoProvera dose she had a period that lasted 4 weeks. It has now slowed to spotting. She notes this is going well and she would like to continue with DepoProvera. Next injection due -May 5th.     We discussed that we have other options for control of heavy bleeding if she would like to change it up or if the bleeding profile with DepoProvera becomes stressful for her. She is a candidate for progestin-only options due to migraine with aura.     O:  /62   Pulse 108   Wt 92.1 kg (203 lb)   LMP 2021   Breastfeeding No   BMI 34.31 kg/m    Gen: Well-appearing, NAD  Pulm: nonlabored  Pelvic:  Deferred as she feels she is doing well.       Pelvic US: 2021  MEASUREMENTS:  Uterus: 8.2 x 6 x 5 cm (Length x Height x Width)  Endometrium: 7 mm in thickness  Right Ovary: Nonvisualized (Length x Height x Width)  Left Ovary:  Not visualized (Length x Height x Width)     UTERUS: A  scar is seen. There is some deformity of the wall  of the uterus at the site of the scar. A small fibroid cannot be  totally excluded.     ADNEXA: No adnexal masses are seen. The ovaries are not visualized.   MISC: No free fluid is seen in the cul-de-sac  IMPRESSION:    Uterine wall scarring at the site of previous . A small  uterine fibroid in this region cannot be excluded but this may simply  represent scarring.    Endometrial biopsy 2021  Secretory endometrium  Negative for chronic endometritis  Negative for hyperplasia or atypia, negative for malignancy    Assessment/Plan  Suzi Miller is a 35 year old  female here for heavy  menstrual bleeding in the setting of suspected endometriosis.     # Heavy menstrual bleeding- improved  - EMB negative 2/4  - Pelvic US with evidence of CS scar and possible small fibroid behind CS scar  - DepoProvera started again 2/4: tolerating well.   Due for next injection April 22- May 6th  - Will follow up again after 1 year or sooner if desired to switch.      Discussed progestin-only options including norethindrone OCPs, DepoProvera, Nexplanon and IUD for symptom control as well.  At this time she is nervous about the implant or an IUD and is comfortable with the plan to use DepoProvera again as she had good luck with that in the past.     Total amount of time spent during today's encounter 25 minutes    LIANA ELISE MD on 4/6/2021 at 10:40 AM

## 2021-04-06 NOTE — NURSING NOTE
Pt presents to clinic today for follow up bacteria vaginosis. Pt states she has no concerns in the last few weeks. When she started the depo she bled for 4 weeks. Pt also noted she still has some spotting.       Medication Reconciliation: complete  Natalya Santos LPN

## 2021-04-25 ENCOUNTER — HEALTH MAINTENANCE LETTER (OUTPATIENT)
Age: 36
End: 2021-04-25

## 2021-05-03 DIAGNOSIS — N93.9 ABNORMAL UTERINE BLEEDING: Primary | ICD-10-CM

## 2021-05-03 RX ORDER — MEDROXYPROGESTERONE ACETATE 150 MG/ML
150 INJECTION, SUSPENSION INTRAMUSCULAR
Status: DISCONTINUED | OUTPATIENT
Start: 2021-05-03 | End: 2022-01-18

## 2021-05-04 ENCOUNTER — ALLIED HEALTH/NURSE VISIT (OUTPATIENT)
Dept: FAMILY MEDICINE | Facility: OTHER | Age: 36
End: 2021-05-04
Attending: STUDENT IN AN ORGANIZED HEALTH CARE EDUCATION/TRAINING PROGRAM
Payer: COMMERCIAL

## 2021-05-04 VITALS — BODY MASS INDEX: 35.12 KG/M2 | DIASTOLIC BLOOD PRESSURE: 80 MMHG | WEIGHT: 207.8 LBS | SYSTOLIC BLOOD PRESSURE: 122 MMHG

## 2021-05-04 DIAGNOSIS — Z30.42 ENCOUNTER FOR DEPO-PROVERA CONTRACEPTION: Primary | ICD-10-CM

## 2021-05-04 PROCEDURE — 96372 THER/PROPH/DIAG INJ SC/IM: CPT | Performed by: STUDENT IN AN ORGANIZED HEALTH CARE EDUCATION/TRAINING PROGRAM

## 2021-05-04 PROCEDURE — 250N000011 HC RX IP 250 OP 636: Performed by: STUDENT IN AN ORGANIZED HEALTH CARE EDUCATION/TRAINING PROGRAM

## 2021-05-04 RX ADMIN — MEDROXYPROGESTERONE ACETATE 150 MG: 150 INJECTION, SUSPENSION, EXTENDED RELEASE INTRAMUSCULAR at 13:32

## 2021-05-04 NOTE — PROGRESS NOTES
Clinic Administered Medication Documentation     Depo Provera Documentation     URINE HCG:not indicated     Depo-Provera Standing Order inclusion/exclusion criteria reviewed.   Patient meets: inclusion criteria    BP: 122/80  LAST PAP/EXAM:   Lab Results   Component Value Date    PAP NIL 02/04/2021       Prior to injection, verified patient identity using patient's name and date of birth. Medication was administered. Please see MAR and medication order for additional information.     Was entire vial of medication used? Yes  Vial/Syringe: Single dose vial  Expiration Date:  12/22    Patient instructed to remain in clinic for 15 minutes and report any adverse reaction to staff immediately      NEXT INJECTION DUE: 7/20/21 - 8/3/21      Nita Bates RN on 5/4/2021 at 1:31 PM

## 2021-05-28 ENCOUNTER — OFFICE VISIT (OUTPATIENT)
Dept: FAMILY MEDICINE | Facility: OTHER | Age: 36
End: 2021-05-28
Payer: COMMERCIAL

## 2021-05-28 VITALS
HEIGHT: 65 IN | RESPIRATION RATE: 20 BRPM | OXYGEN SATURATION: 98 % | HEART RATE: 118 BPM | WEIGHT: 204.4 LBS | BODY MASS INDEX: 34.05 KG/M2 | DIASTOLIC BLOOD PRESSURE: 80 MMHG | TEMPERATURE: 98.7 F | SYSTOLIC BLOOD PRESSURE: 132 MMHG

## 2021-05-28 DIAGNOSIS — G43.811 OTHER MIGRAINE WITH STATUS MIGRAINOSUS, INTRACTABLE: Primary | ICD-10-CM

## 2021-05-28 PROCEDURE — G0463 HOSPITAL OUTPT CLINIC VISIT: HCPCS | Mod: 25

## 2021-05-28 PROCEDURE — 96372 THER/PROPH/DIAG INJ SC/IM: CPT | Performed by: PHYSICIAN ASSISTANT

## 2021-05-28 PROCEDURE — 250N000011 HC RX IP 250 OP 636: Performed by: PHYSICIAN ASSISTANT

## 2021-05-28 PROCEDURE — 99213 OFFICE O/P EST LOW 20 MIN: CPT | Performed by: PHYSICIAN ASSISTANT

## 2021-05-28 RX ORDER — KETOROLAC TROMETHAMINE 30 MG/ML
30 INJECTION, SOLUTION INTRAMUSCULAR; INTRAVENOUS ONCE
Status: COMPLETED | OUTPATIENT
Start: 2021-05-28 | End: 2021-05-28

## 2021-05-28 RX ORDER — ONDANSETRON 4 MG/1
4 TABLET, ORALLY DISINTEGRATING ORAL EVERY 8 HOURS PRN
Qty: 20 TABLET | Refills: 0 | Status: SHIPPED | OUTPATIENT
Start: 2021-05-28 | End: 2022-03-21

## 2021-05-28 RX ORDER — KETOROLAC TROMETHAMINE 10 MG/1
10 TABLET, FILM COATED ORAL EVERY 6 HOURS PRN
Qty: 20 TABLET | Refills: 0 | Status: SHIPPED | OUTPATIENT
Start: 2021-05-28 | End: 2021-06-11

## 2021-05-28 RX ADMIN — KETOROLAC TROMETHAMINE 30 MG: 30 INJECTION, SOLUTION INTRAMUSCULAR; INTRAVENOUS at 16:27

## 2021-05-28 ASSESSMENT — MIFFLIN-ST. JEOR: SCORE: 1619.06

## 2021-05-28 ASSESSMENT — PAIN SCALES - GENERAL: PAINLEVEL: SEVERE PAIN (6)

## 2021-05-28 NOTE — PROGRESS NOTES
ASSESSMENT/PLAN:    I have reviewed the nursing notes.  I have reviewed the findings, diagnosis, plan and need for follow up with the patient.    (G43.811) Other migraine with status migrainosus, intractable  (primary encounter diagnosis)  Comment: See below  Plan: ketorolac (TORADOL) injection 30 mg, ketorolac         (TORADOL) 10 MG tablet, ondansetron         (ZOFRAN-ODT) 4 MG ODT tab  No signs stable.  Physical exam consistent with acute migraine, cannot rule out involvement of Depo-Provera immunization.  Gave patient 30 mg of Toradol IM and she tolerated this well, she was monitored for 10 minutes prior to discharging home.  She elected for no further medications during her [facility] visit.  She was discharged home until 1 mg Toradol tablets which she can alternate with Tylenol every 4-6 hours.  She understands not to take any other inflammatory such as Aleve, naproxen or ibuprofen with Toradol as they are all in the same anti-inflammatory class.  Short-term prescription of Zofran for nausea was prescribed to patient as well.  I recommended that she follow-up with family practice early next week to discuss her migraines and possible correlation to birth control.  If she develops worsening eye pain, irretractable pain/migraine, fevers, chills, change in mental status or other worrisome symptoms she should follow-up for reevaluation. Patient is in agreement and understanding of the above treatment plan. All questions and concerns were addressed and answered to patient's satisfaction. AVS reviewed with patient.     No advanced imaging was performed is no neurologic deficits noted on examination, no stroke warning signs.    Discussed warning signs/symptoms indicative of need to f/u    Follow up if symptoms persist or worsen or concerns    I explained my diagnostic considerations and recommendations to the patient, who voiced understanding and agreement with the treatment plan. All questions were answered. We  discussed potential side effects of any prescribed or recommended therapies, as well as expectations for response to treatments.    Bryanna Medina PA-C  2021  3:51 PM    HPI:    Suzi Miller is a 36 year old female  who [facility] for concerns of migraine x 2 days duration. She states that it feels different than her usual - this is new to her. This feels like pressure versus sharp stabbing pain. She went on the Depo recently to try to alleviate endometriosis pain - but was told if she has vision or migraine issues she was told to call in. Last dose of Depo was 5/3/2021.     Headache    Onset: 2 day(s) ago    Description:                Type: Migraine headache                 Location (unilateral/bilateral): bilateral in the frontal area, bur left eye is worse, feels like eyes are puffier.    Character: pressure                 Frequency/Intensity:  Unchanged                 Pain scale ratin/10                 Are headaches getting more intense or more frequent: no    Presence of aura: no    Precipitating and/or Alleviating factors:        How much caffeine do you use daily: cup of coffee  Does movement, bending over, increase pain: YES  Does light/sound make it worse: YES  Does sleep help: no  Do you wake up with a headache or does it wake you from sleep: no                 Are you able to do daily activities: no    Accompanying Signs & Symptoms:   Stiff neck: no  Fever: no  Confusion: no  Sinus pressure: no  Nausea or vomiting: no  Dizziness: no  Numbness: no  Weakness: no  Visual changes: no    History:    Any head trauma: no  Any previously history of headaches: YES  Any family history of migraines: YES  Any previous tests for headaches: YES  Have you seen a neurologist before: YES  When was vision last checked: 2020  Do you wear glasses or contacts: glasses  How much caffeine do you drink (soda, tea, coffee, etc): see above  How often do you eat nitrate containing foods:  ( hot dogs, processed meat or cheese, tena etc):  Not much  Does bright sunshine or light bring on headaches: yes  How much water/fluids do you drink: tries to drink water  Do you have eczema/sensitive skin/allergies or asthma: none  What kind of fragrances are you around: scents makes it work - opened bath and body works box earlier  Women: presence of birth control: YES, if yes, type: Depo  Women: LMP: weeks - is on Depo - constant spotting      Medications tried and outcome:  Sleep, tylenol and ibuprofen together, avoiding provocative activities with minor relief    ANY OTHER CHANGE in MEDICATIONS: none    PCP: none    Past Medical History:   Diagnosis Date     Endometriosis      Hashimoto's thyroiditis      Past Surgical History:   Procedure Laterality Date     C/SECTION, LOW TRANSVERSE       CLOSED RX BIG TOE FRACTURE  2009    hardware     EXCISE NODULE THYROID       LEEP TX, CERVICAL  2003     Social History     Tobacco Use     Smoking status: Never Smoker     Smokeless tobacco: Never Used   Substance Use Topics     Alcohol use: Yes     Comment: rare     Current Outpatient Medications   Medication Sig Dispense Refill     Acetaminophen (TYLENOL 8 HOUR PO) Take by mouth daily       APAP-Parabrom-Pyrilamine (PAMPRIN MULTI-SYMPTOM) 500-25-15 MG TABS Take 2 tablets by mouth At Bedtime       melatonin 3 MG CAPS Take 1 capsule by mouth At Bedtime       UNABLE TO FIND MEDICATION NAME: Protandim       ondansetron (ZOFRAN-ODT) 4 MG ODT tab Take 1 tablet (4 mg) by mouth every 8 hours as needed for nausea or vomiting (Patient not taking: Reported on 5/28/2021) 20 tablet 0     polyethylene glycol (MIRALAX) 17 GM/Dose powder Take 17-34 g (1-2 capfuls) by mouth 2 times daily as needed for constipation (Patient not taking: Reported on 5/28/2021) 500 g 11     Allergies   Allergen Reactions     Penicillins Anaphylaxis     Past medical history, past surgical history, current medications and allergies reviewed and accurate to  "the best of my knowledge.      ROS:  Refer to HPI    /80   Pulse 118   Temp 98.7  F (37.1  C) (Tympanic)   Resp 20   Ht 1.645 m (5' 4.75\")   Wt 92.7 kg (204 lb 6.4 oz)   SpO2 98%   BMI 34.28 kg/m      EXAM:  Constitutional: healthy, alert and mild distress   Cardiovascular: negative, PMI normal. No lifts, heaves, or thrills. RRR. No murmurs, clicks gallops or rub  Respiratory: negative, Percussion normal. Good diaphragmatic excursion. Lungs clear  Psychiatric: mentation appears normal and affect normal/bright  Head: Normocephalic. No masses, lesions, tenderness or abnormalities. Palpation of temporal arteries: Normal  Neck: Neck supple. No adenopathy.  ENT: ENT exam normal, no neck nodes or sinus tenderness  NEURO: Gait normal. Reflexes normal and symmetric. Sensation grossly WNL.  Normal cranial nerve function, normal pupillary size and reaction.  Normal accommodation and extraocular motions.    Labs:  None     Xray:  None     "

## 2021-05-28 NOTE — NURSING NOTE
"Chief Complaint   Patient presents with     Headache     Patient is here for a migraine and blurry vision that started 2 nights ago. Patient states she has not had blurry vision with her migraine before. Patient took an 8-hour Tylenol this morning around 9am.     VISION   Wears glasses: worn for testing  Tool used: Phillips   Right eye:        10/16 (20/32)   Left eye:          10/16 (20/32)     Initial /80   Pulse 118   Temp 98.7  F (37.1  C) (Tympanic)   Resp 20   Ht 1.645 m (5' 4.75\")   Wt 92.7 kg (204 lb 6.4 oz)   SpO2 98%   BMI 34.28 kg/m   Estimated body mass index is 34.28 kg/m  as calculated from the following:    Height as of this encounter: 1.645 m (5' 4.75\").    Weight as of this encounter: 92.7 kg (204 lb 6.4 oz).  Medication Reconciliation: complete    Amira Salgado LPN  "

## 2021-05-28 NOTE — PATIENT INSTRUCTIONS
"Please refer to your AVS for follow up and pain/symptoms management recommendations (I.e.: medications, helpful conservative treatment modalities, appropriate follow up if need to a specialist or family practice, etc.). Please return to urgent care if your symptoms change or worsen.     Discharge instructions:  -If you were prescribed a medication(s), please take this as prescribed/directed  -Monitor your symptoms, if changing/worsening, return to UC/ER or PCP for follow up    Keep a headache diary (there are a number of free apps and websites with examples) to see if you are able to determine a trigger for your headaches.    Moderate yoga 3-4 times per week has been shown to be helpful in preventing headache. There are a variety of free yoga videos on You Tube, in video podcasts, and in apps. Try \"eagle arms\" pose to stretch shoulders when doing yoga. \"Legs up the wall\" pose may also be helpful to prevent or relieve headache. Avoid poses such as \"plow\" or \"shoulder stand\" or \"wheel\" unless more advanced in yoga.    You may try Tiger Balm (found with pain relievers at the pharmacy) or peppermint oil rubbed on your temples for headache if it is helpful.    You may try a dose of caffeine to see if it helps your headaches. Excedrin Migraine contains caffeine, and some people find it helpful (only if over age 18 or with guidance from your healthcare provider). Another option is black coffee, or Bubblr water (available in the natural foods section) that is low calorie, all natural, and has as much caffeine as a cup of coffee. There are other caffeinated soto as well.    "

## 2021-06-01 ENCOUNTER — OFFICE VISIT (OUTPATIENT)
Dept: FAMILY MEDICINE | Facility: OTHER | Age: 36
End: 2021-06-01
Attending: FAMILY MEDICINE
Payer: COMMERCIAL

## 2021-06-01 VITALS
BODY MASS INDEX: 34.52 KG/M2 | DIASTOLIC BLOOD PRESSURE: 80 MMHG | HEART RATE: 102 BPM | SYSTOLIC BLOOD PRESSURE: 120 MMHG | OXYGEN SATURATION: 99 % | RESPIRATION RATE: 16 BRPM | HEIGHT: 65 IN | WEIGHT: 207.2 LBS | TEMPERATURE: 98.3 F

## 2021-06-01 DIAGNOSIS — E06.3 HASHIMOTO'S THYROIDITIS: Primary | ICD-10-CM

## 2021-06-01 DIAGNOSIS — G43.001 MIGRAINE WITHOUT AURA AND WITH STATUS MIGRAINOSUS, NOT INTRACTABLE: ICD-10-CM

## 2021-06-01 LAB
T4 FREE SERPL-MCNC: 0.86 NG/DL (ref 0.6–1.6)
TSH SERPL DL<=0.05 MIU/L-ACNC: 1.64 IU/ML (ref 0.34–5.6)

## 2021-06-01 PROCEDURE — 84443 ASSAY THYROID STIM HORMONE: CPT | Mod: ZL | Performed by: FAMILY MEDICINE

## 2021-06-01 PROCEDURE — G0463 HOSPITAL OUTPT CLINIC VISIT: HCPCS

## 2021-06-01 PROCEDURE — 99215 OFFICE O/P EST HI 40 MIN: CPT | Performed by: FAMILY MEDICINE

## 2021-06-01 PROCEDURE — 36415 COLL VENOUS BLD VENIPUNCTURE: CPT | Mod: ZL | Performed by: FAMILY MEDICINE

## 2021-06-01 PROCEDURE — 84439 ASSAY OF FREE THYROXINE: CPT | Mod: ZL | Performed by: FAMILY MEDICINE

## 2021-06-01 PROCEDURE — G0463 HOSPITAL OUTPT CLINIC VISIT: HCPCS | Mod: 25

## 2021-06-01 PROCEDURE — 86376 MICROSOMAL ANTIBODY EACH: CPT | Mod: ZL | Performed by: FAMILY MEDICINE

## 2021-06-01 RX ORDER — TOPIRAMATE 25 MG/1
TABLET, FILM COATED ORAL
Qty: 21 TABLET | Refills: 0 | Status: SHIPPED | OUTPATIENT
Start: 2021-06-15 | End: 2022-01-18

## 2021-06-01 RX ORDER — TOPIRAMATE 50 MG/1
50 TABLET, FILM COATED ORAL 2 TIMES DAILY
Qty: 60 TABLET | Refills: 0 | Status: SHIPPED | OUTPATIENT
Start: 2021-06-23 | End: 2022-01-18

## 2021-06-01 RX ORDER — TOPIRAMATE 25 MG/1
TABLET, FILM COATED ORAL
Qty: 21 TABLET | Refills: 0 | Status: SHIPPED | OUTPATIENT
Start: 2021-06-01 | End: 2022-01-18

## 2021-06-01 ASSESSMENT — PAIN SCALES - GENERAL: PAINLEVEL: MODERATE PAIN (4)

## 2021-06-01 ASSESSMENT — ENCOUNTER SYMPTOMS
FEVER: 0
PARESTHESIAS: 0
CHILLS: 0
NUMBNESS: 0
WEAKNESS: 0

## 2021-06-01 ASSESSMENT — MIFFLIN-ST. JEOR: SCORE: 1631.76

## 2021-06-01 NOTE — NURSING NOTE
"Chief Complaint   Patient presents with     RECHECK     Migraines     Visual Acuity Screening - Phillips Chart   Visualacuity OD (right eye): 10/ 25   Visual acuity OS (left eye): 10/ 25   Visual acuity OU (both eyes): 10/ 20   Corrective lenses worn: Yes Glasses               Initial /80   Pulse 102   Temp 98.3  F (36.8  C) (Tympanic)   Resp 16   Ht 1.645 m (5' 4.75\")   Wt 94 kg (207 lb 3.2 oz)   SpO2 99%   BMI 34.75 kg/m   Estimated body mass index is 34.75 kg/m  as calculated from the following:    Height as of this encounter: 1.645 m (5' 4.75\").    Weight as of this encounter: 94 kg (207 lb 3.2 oz).         Medication Reconciliation: Complete      Norma J. Gosselin, LPN   "

## 2021-06-01 NOTE — PROGRESS NOTES
"Assessment & Plan     Hashimoto's thyroiditis  No palpable mass on physical exam.  Check TSH, free T4, and TPO.  Plan to treat as indicated.  Consider further evaluation with thyroid US.  - Thyroid peroxidase antibody  - T4, Free  - TSH Reflex GH    Migraine without aura and with status migrainosus, not intractable  Her headache is consistent with migraine, may be related to recent change in birth control.  Trial Topiramate for prophylaxis.  Encouraged her to take lowest dose which is effective in controlling her symptoms.  Discussed red flag symptoms and recommend she follow-up if symptoms worsening or failing to improve.  Otherwise follow-up in four weeks for reassessment.  - topiramate (TOPAMAX) 25 MG tablet; Take 1 tablet (25 mg) by mouth At Bedtime for 7 days, THEN 1 tablet (25 mg) 2 times daily for 7 days.  - topiramate (TOPAMAX) 25 MG tablet; Take 1 tablet (25 mg) by mouth every morning AND 2 tablets (50 mg) At Bedtime. Do all this for 7 days.  - topiramate (TOPAMAX) 50 MG tablet; Take 1 tablet (50 mg) by mouth 2 times daily    50 minutes spent on the date of the encounter doing chart review, history and exam, documentation and further activities per the note    Eleanor Be, [facility]    Kristina Archer is a 36 year old who presents for the following health issues:    HPI     She has a history of Hashimoto's thyroiditis, and she reports that the right side of her thyroid was removed due to a nodule.  She had undergone a biopsy which was inconclusive, but she was having issues related to her breathing and swallowing from the size of it.  She was diagnosed with Hashimoto's after pathology examination from the surgery.  She is not currently taking any medication for her thyroid at this time.  Diagnosis and surgery was in [place].    She has a history of migraines associated with aura followed by headache of \"stabbing pain.\"  Her current symptoms feel different.  She reports " "\"pressure\" sensation associated with feeling like her vision is blurred.  She was recently started on Depo injection for help with her endometriosis and was told to follow-up if she experienced migraines.  [facility] and received Toradol injection followed by prescription.  She reports that her headache has been constant since then, though the pain has lessened with the medication.  Pain is located over the top of her head and back of her eyes.  Associated nausea and sensitivity to light and sound and smell.  Severity waxes and wanes.  She was last at the eye doctor in October and got new glasses then.  She has not had any issues until now.    Review of Systems   Constitutional: Negative for chills and fever.   Neurological: Negative for weakness, numbness and paresthesias.          Objective    /80   Pulse 102   Temp 98.3  F (36.8  C) (Tympanic)   Resp 16   Ht 1.645 m (5' 4.75\")   Wt 94 kg (207 lb 3.2 oz)   SpO2 99%   BMI 34.75 kg/m    Body mass index is 34.75 kg/m .  Physical Exam  Constitutional:       General: She is not in acute distress.     Appearance: Normal appearance. She is not ill-appearing.   HENT:      Right Ear: Tympanic membrane normal.      Left Ear: Tympanic membrane normal.      Nose: Nose normal.      Mouth/Throat:      Mouth: Mucous membranes are moist.      Pharynx: Oropharynx is clear. No oropharyngeal exudate or posterior oropharyngeal erythema.   Eyes:      General:         Right eye: No discharge.         Left eye: No discharge.      Extraocular Movements: Extraocular movements intact.      Pupils: Pupils are equal, round, and reactive to light.   Neck:      Musculoskeletal: Neck supple. No muscular tenderness.      Thyroid: No thyroid mass, thyromegaly or thyroid tenderness.   Cardiovascular:      Rate and Rhythm: Normal rate and regular rhythm.      Heart sounds: No murmur. No friction rub. No gallop.    Pulmonary:      Effort: Pulmonary effort is normal.    "   Breath sounds: Normal breath sounds. No wheezing, rhonchi or rales.   Lymphadenopathy:      Cervical: No cervical adenopathy.   Neurological:      Mental Status: She is alert.      Cranial Nerves: No cranial nerve deficit.     Vision 20/50 in right and left eyes and 20/40 together.

## 2021-06-02 LAB — THYROPEROXIDASE AB SERPL-ACNC: <10 IU/ML

## 2021-06-02 ASSESSMENT — ASTHMA QUESTIONNAIRES: ACT_TOTALSCORE: 24

## 2021-06-11 ENCOUNTER — OFFICE VISIT (OUTPATIENT)
Dept: FAMILY MEDICINE | Facility: OTHER | Age: 36
End: 2021-06-11
Attending: PHYSICIAN ASSISTANT
Payer: COMMERCIAL

## 2021-06-11 VITALS
TEMPERATURE: 97.9 F | BODY MASS INDEX: 33.99 KG/M2 | WEIGHT: 204 LBS | DIASTOLIC BLOOD PRESSURE: 64 MMHG | HEIGHT: 65 IN | RESPIRATION RATE: 12 BRPM | SYSTOLIC BLOOD PRESSURE: 124 MMHG | HEART RATE: 88 BPM | OXYGEN SATURATION: 99 %

## 2021-06-11 DIAGNOSIS — E06.3 HASHIMOTO'S DISEASE: ICD-10-CM

## 2021-06-11 DIAGNOSIS — G43.909 MIGRAINE WITHOUT STATUS MIGRAINOSUS, NOT INTRACTABLE, UNSPECIFIED MIGRAINE TYPE: Primary | ICD-10-CM

## 2021-06-11 PROCEDURE — 250N000013 HC RX MED GY IP 250 OP 250 PS 637: Performed by: PHYSICIAN ASSISTANT

## 2021-06-11 PROCEDURE — 96372 THER/PROPH/DIAG INJ SC/IM: CPT | Performed by: PHYSICIAN ASSISTANT

## 2021-06-11 PROCEDURE — 250N000009 HC RX 250: Performed by: PHYSICIAN ASSISTANT

## 2021-06-11 PROCEDURE — 250N000011 HC RX IP 250 OP 636: Performed by: PHYSICIAN ASSISTANT

## 2021-06-11 PROCEDURE — 99214 OFFICE O/P EST MOD 30 MIN: CPT | Performed by: PHYSICIAN ASSISTANT

## 2021-06-11 RX ORDER — DIPHENHYDRAMINE HCL 25 MG
25 CAPSULE ORAL EVERY 6 HOURS PRN
Status: DISCONTINUED | OUTPATIENT
Start: 2021-06-11 | End: 2021-07-27

## 2021-06-11 RX ORDER — DEXAMETHASONE SODIUM PHOSPHATE 4 MG/ML
10 VIAL (ML) INJECTION ONCE
Status: COMPLETED | OUTPATIENT
Start: 2021-06-11 | End: 2021-06-11

## 2021-06-11 RX ORDER — KETOROLAC TROMETHAMINE 30 MG/ML
60 INJECTION, SOLUTION INTRAMUSCULAR; INTRAVENOUS ONCE
Status: COMPLETED | OUTPATIENT
Start: 2021-06-11 | End: 2021-06-11

## 2021-06-11 RX ORDER — KETOROLAC TROMETHAMINE 30 MG/ML
60 INJECTION, SOLUTION INTRAMUSCULAR; INTRAVENOUS EVERY 6 HOURS PRN
Status: DISCONTINUED | OUTPATIENT
Start: 2021-06-11 | End: 2021-06-11 | Stop reason: CLARIF

## 2021-06-11 RX ORDER — SUMATRIPTAN 50 MG/1
50 TABLET, FILM COATED ORAL
Qty: 30 TABLET | Refills: 0 | Status: SHIPPED | OUTPATIENT
Start: 2021-06-11 | End: 2022-01-18

## 2021-06-11 RX ADMIN — DEXAMETHASONE SODIUM PHOSPHATE 10 MG: 4 INJECTION, SOLUTION INTRAMUSCULAR; INTRAVENOUS at 15:30

## 2021-06-11 RX ADMIN — KETOROLAC TROMETHAMINE 60 MG: 30 INJECTION, SOLUTION INTRAMUSCULAR at 15:32

## 2021-06-11 RX ADMIN — DIPHENHYDRAMINE HYDROCHLORIDE 25 MG: 25 CAPSULE ORAL at 15:35

## 2021-06-11 ASSESSMENT — MIFFLIN-ST. JEOR: SCORE: 1617.25

## 2021-06-11 ASSESSMENT — PAIN SCALES - GENERAL: PAINLEVEL: MODERATE PAIN (4)

## 2021-06-11 NOTE — NURSING NOTE
Patient presents to clinic with migraine for the past 17 days.  Tory Peres LPN ....................  6/11/2021   2:39 PM

## 2021-06-11 NOTE — PROGRESS NOTES
Assessment & Plan     1. Migraine without status migrainosus, not intractable, unspecified migraine type  Discussion with patient regarding migraine treatment options. She states she would like to taper off topiramate as that has caused side effects. Discussed alternative options including prophylactic vs as needed medications. She would like to try prn medication at this time. Prescription given for Imitrex to be used prn. Discussed safe taper of topiramate. For current migraine, cocktail as outlined below. Patient tolerated well. Follow up as needed.   - diphenhydrAMINE (BENADRYL) capsule 25 mg  - dexamethasone (DECADRON) injectable solution used ORALLY 10 mg  - ketorolac (TORADOL) injection 60 mg  - SUMAtriptan (IMITREX) 50 MG tablet; Take 1 tablet (50 mg) by mouth at onset of headache for migraine May repeat in 2 hours. Max 4 tablets/24 hours.  Dispense: 30 tablet; Refill: 0    2. Hashimoto's disease  No masses noted on exam today. Recent labs including TSH, free T4, and TPO unremarkable. Will proceed forward with thyroid ultrasound. Additional treatment plan will be made once results are received.   - US Thyroid; Future      No follow-ups on file.    Vonda Goodson PA-C  [facility]   Suzi is a 36 year old who presents for the following health issues     HPI   Here for follow up on migraines. Seen in [facility] on 5/28 with active migraine. Had received Depo Provera injection on 5/3. She was given Toradol injection and discharged home. Given course or oral Toradol and Zofran on discharge as well. Seen in clinic on 6/1 for additional evaluation of migraines. She was started on topiramate for migraine prophylaxis.     Patient is presenting today with continued migraines, active migraine today. Has been taking topiramate as prescribed. Has increased dosing to 25 mg BID. Has provided some relief of pain. Previously was at 8/10 and now is a 3-4/10 on pain severity scale.  Has been taking Tylenol prn as well. Toradol as provided no relief so she stopped taking that. Since starting on the topiramate she has noticed she feels more lethargic, difficulties thinking, focusing. Unable to drive because she can't focus.  She has developed increased anhedonia, flat mood.  She does work with a therapist but that is not improving symptoms.  She does have a history of ocular migraines in the past but her current symptoms are very different from previous.  She describes as frontal bilateral pressure behind her eyes.  She does get double vision associated with her migraines.  No associated chest pains or pressures, palpitations, dizziness, lightheadedness, syncope, numbness or tingling, weakness.    Patient also has a history of Hashimoto's thyroiditis.  Per her report the right side of her thyroid was removed due to a nodule when she was in [place].  Biopsy was inconclusive.  Not currently taking any thyroid medication at this time.  States she feels like the right side of her thyroid is enlarged again.  Labs are completed regarding this during her office visit on 6/1.  TSH, free T4 and TPO were all unremarkable at that time.  Discussion regarding potential thyroid ultrasound at her previous visit.  She would like to proceed forward with this today.      PAST MEDICAL HISTORY:   Past Medical History:   Diagnosis Date     Endometriosis      Hashimoto's thyroiditis        PAST SURGICAL HISTORY:   Past Surgical History:   Procedure Laterality Date     C/SECTION, LOW TRANSVERSE       CLOSED RX BIG TOE FRACTURE  2009    hardware     EXCISE NODULE THYROID       LEEP TX, CERVICAL  2003       FAMILY HISTORY: No family history on file.    SOCIAL HISTORY:   Social History     Tobacco Use     Smoking status: Never Smoker     Smokeless tobacco: Never Used   Substance Use Topics     Alcohol use: Yes     Comment: rare        Allergies   Allergen Reactions     Penicillins Anaphylaxis     Current Outpatient  "Medications   Medication     Acetaminophen (TYLENOL ARTHRITIS EXT RELIEF OR)     APAP-Parabrom-Pyrilamine (PAMPRIN MULTI-SYMPTOM) 500-25-15 MG TABS     melatonin 3 MG CAPS     ondansetron (ZOFRAN-ODT) 4 MG ODT tab     SUMAtriptan (IMITREX) 50 MG tablet     topiramate (TOPAMAX) 25 MG tablet     [START ON 6/15/2021] topiramate (TOPAMAX) 25 MG tablet     [START ON 6/23/2021] topiramate (TOPAMAX) 50 MG tablet     UNABLE TO FIND     Current Facility-Administered Medications   Medication     diphenhydrAMINE (BENADRYL) capsule 25 mg     medroxyPROGESTERone (DEPO-PROVERA) injection 150 mg         Review of Systems   Per HPI        Objective    /64   Pulse 88   Temp 97.9  F (36.6  C)   Resp 12   Ht 1.645 m (5' 4.75\")   Wt 92.5 kg (204 lb)   SpO2 99%   Breastfeeding No   BMI 34.21 kg/m    Body mass index is 34.21 kg/m .  Physical Exam   General: Pleasant, in no apparent distress.  Eyes: Sclera are white and conjunctiva are clear bilaterally. Lacrimal apparatus free of erythema, edema, and discharge bilaterally.  Thyroid: No palpable masses.   Cardiovascular: Regular rate and rhythm with S1 equal to S2. No murmurs, friction rubs, or gallops.   Respiratory: Lungs are resonant and clear to auscultation bilaterally. No wheezes, crackles, or rhonchi.  Neurologic Exam:  normal gross motor, tone coordination and no visible tremor.  Skin: No jaundice, pallor, rashes, or lesions.  Psych: Appropriate mood and flat affect.            "

## 2021-06-21 ENCOUNTER — HOSPITAL ENCOUNTER (OUTPATIENT)
Dept: ULTRASOUND IMAGING | Facility: OTHER | Age: 36
Discharge: HOME | End: 2021-06-21
Attending: PHYSICIAN ASSISTANT | Admitting: PHYSICIAN ASSISTANT
Payer: COMMERCIAL

## 2021-06-21 DIAGNOSIS — E06.3 HASHIMOTO'S DISEASE: ICD-10-CM

## 2021-06-21 PROCEDURE — 76536 US EXAM OF HEAD AND NECK: CPT

## 2021-07-26 PROCEDURE — 99283 EMERGENCY DEPT VISIT LOW MDM: CPT | Performed by: EMERGENCY MEDICINE

## 2021-07-26 ASSESSMENT — MIFFLIN-ST. JEOR: SCORE: 1587.19

## 2021-07-27 ENCOUNTER — HOSPITAL ENCOUNTER (EMERGENCY)
Facility: OTHER | Age: 36
Discharge: HOME | End: 2021-07-27
Attending: EMERGENCY MEDICINE | Admitting: EMERGENCY MEDICINE
Payer: COMMERCIAL

## 2021-07-27 VITALS
WEIGHT: 200 LBS | OXYGEN SATURATION: 97 % | SYSTOLIC BLOOD PRESSURE: 131 MMHG | HEIGHT: 64 IN | TEMPERATURE: 99.5 F | HEART RATE: 111 BPM | RESPIRATION RATE: 16 BRPM | BODY MASS INDEX: 34.15 KG/M2 | DIASTOLIC BLOOD PRESSURE: 87 MMHG

## 2021-07-27 DIAGNOSIS — L50.9 HIVES: ICD-10-CM

## 2021-07-27 DIAGNOSIS — T78.40XA ALLERGIC REACTION, INITIAL ENCOUNTER: ICD-10-CM

## 2021-07-27 PROCEDURE — 250N000013 HC RX MED GY IP 250 OP 250 PS 637: Performed by: EMERGENCY MEDICINE

## 2021-07-27 PROCEDURE — 99283 EMERGENCY DEPT VISIT LOW MDM: CPT | Performed by: EMERGENCY MEDICINE

## 2021-07-27 PROCEDURE — 250N000012 HC RX MED GY IP 250 OP 636 PS 637: Performed by: EMERGENCY MEDICINE

## 2021-07-27 RX ORDER — DIPHENHYDRAMINE HCL 50 MG
50 CAPSULE ORAL EVERY 6 HOURS PRN
Qty: 12 CAPSULE | Refills: 0 | Status: SHIPPED | OUTPATIENT
Start: 2021-07-27 | End: 2021-07-29

## 2021-07-27 RX ORDER — PREDNISONE 20 MG/1
60 TABLET ORAL DAILY
Qty: 6 TABLET | Refills: 0 | Status: SHIPPED | OUTPATIENT
Start: 2021-07-27 | End: 2021-07-29

## 2021-07-27 RX ORDER — DIPHENHYDRAMINE HCL 25 MG
50 CAPSULE ORAL ONCE
Status: COMPLETED | OUTPATIENT
Start: 2021-07-27 | End: 2021-07-27

## 2021-07-27 RX ORDER — PREDNISONE 20 MG/1
60 TABLET ORAL ONCE
Status: COMPLETED | OUTPATIENT
Start: 2021-07-27 | End: 2021-07-27

## 2021-07-27 RX ORDER — FAMOTIDINE 20 MG/1
20 TABLET, FILM COATED ORAL ONCE
Status: COMPLETED | OUTPATIENT
Start: 2021-07-27 | End: 2021-07-27

## 2021-07-27 RX ORDER — EPINEPHRINE 0.3 MG/.3ML
0.3 INJECTION SUBCUTANEOUS
Qty: 1 EACH | Refills: 0 | Status: SHIPPED | OUTPATIENT
Start: 2021-07-27

## 2021-07-27 RX ADMIN — FAMOTIDINE 20 MG: 20 TABLET ORAL at 01:55

## 2021-07-27 RX ADMIN — DIPHENHYDRAMINE HYDROCHLORIDE 50 MG: 25 CAPSULE ORAL at 01:55

## 2021-07-27 RX ADMIN — PREDNISONE 60 MG: 20 TABLET ORAL at 01:55

## 2021-07-27 NOTE — Clinical Note
Suzi Miller was seen and treated in our emergency department on 7/26/2021.  She may return to work on 07/28/2021.       If you have any questions or concerns, please don't hesitate to call.      Isa Howell MD

## 2021-07-27 NOTE — ED TRIAGE NOTES
ED Nursing Triage Note (General)   ________________________________    Suzi Miller is a 36 year old Female that presents to triage private car  With history of  Pt states that she had an allergic reaction to something unknown.  Pt states this started at 2000 tonight and her medications did not help.  Pt reports having hives on her whole body reported by patient.  Pt states that she is able to swallow but it hurts.  Significant symptoms had onset 3 hour(s) ago.    Patient appears alert , in no acute distress., and cooperative behavior.    GCS Eye Opening = 4=Spontaneous  Airway: intact  Breathing noted as Normal  Circulation Normal  Skin:  Normal  Action taken:  Triage to critical care immediately      PRE HOSPITAL PRIOR LIVING SITUATION Spouse

## 2021-07-27 NOTE — ED PROVIDER NOTES
History     Chief Complaint   Patient presents with     Allergic Reaction     HPI  Suzi Miller is a 36 year old female who presents with allergic reaction. She has a new rash that started about 2 hours prior to arrival. It occurred while she was sitting at home reading a book about 1 hour after she had eaten.  She has a known allergic reaction to peanuts and tree nuts, does not carry an EpiPen.  She reports the rash started on her left ear and then proceeded down the rest of her body.  Rash has been appearing and disappearing all over her body since the onset.  She tried taking Claritin and ranitidine at home without improvement.  She did not have any Benadryl.  She reports a scratchy throat and mild cough but no difficulty breathing or facial swelling.  She denies chest pain, headaches, fever, recent illness, does report nausea but no vomiting.    Allergies:  Allergies   Allergen Reactions     Penicillins Anaphylaxis       Problem List:    Patient Active Problem List    Diagnosis Date Noted     Hashimoto's disease 02/28/2019     Priority: Medium     Restrictive airway disease 05/01/2007     Priority: Medium     Endometriosis 06/01/2002     Priority: Medium        Past Medical History:    Past Medical History:   Diagnosis Date     Endometriosis      Hashimoto's thyroiditis        Past Surgical History:    Past Surgical History:   Procedure Laterality Date     C/SECTION, LOW TRANSVERSE       CLOSED RX BIG TOE FRACTURE  2009    hardware     EXCISE NODULE THYROID       LEEP TX, CERVICAL  2003       Family History:    History reviewed. No pertinent family history.    Social History:  Marital Status:   [2]  Social History     Tobacco Use     Smoking status: Never Smoker     Smokeless tobacco: Never Used   Substance Use Topics     Alcohol use: Yes     Comment: rare     Drug use: Never        Medications:    diphenhydrAMINE (BENADRYL) 50 MG capsule  EPINEPHrine (ANY BX GENERIC EQUIV) 0.3 MG/0.3ML injection  "2-pack  predniSONE (DELTASONE) 20 MG tablet  Acetaminophen (TYLENOL ARTHRITIS EXT RELIEF OR)  APAP-Parabrom-Pyrilamine (PAMPRIN MULTI-SYMPTOM) 500-25-15 MG TABS  melatonin 3 MG CAPS  ondansetron (ZOFRAN-ODT) 4 MG ODT tab  SUMAtriptan (IMITREX) 50 MG tablet  topiramate (TOPAMAX) 25 MG tablet  topiramate (TOPAMAX) 25 MG tablet  topiramate (TOPAMAX) 50 MG tablet  UNABLE TO FIND          Review of Systems    Physical Exam   BP: (!) 170/91  Pulse: 111  Temp: 99.5  F (37.5  C)  Resp: 17  Height: 162.6 cm (5' 4\")  Weight: 90.7 kg (200 lb)  SpO2: 99 %      Physical Exam  Constitutional:       General: She is not in acute distress.     Appearance: She is not ill-appearing.   HENT:      Head: Normocephalic and atraumatic.      Nose: Nose normal.      Mouth/Throat:      Mouth: Mucous membranes are moist.      Pharynx: Oropharynx is clear.   Eyes:      Conjunctiva/sclera: Conjunctivae normal.      Pupils: Pupils are equal, round, and reactive to light.   Cardiovascular:      Rate and Rhythm: Normal rate and regular rhythm.   Pulmonary:      Effort: Pulmonary effort is normal.      Breath sounds: Normal breath sounds.   Abdominal:      Palpations: Abdomen is soft.      Tenderness: There is no abdominal tenderness.   Musculoskeletal:      Cervical back: Normal range of motion.      Right lower leg: No edema.      Left lower leg: No edema.      Comments: Moving all extremities symmetrically and ambulating well   Skin:     General: Skin is warm and dry.      Comments: Diffuse red rash with scattered hives and some excoriations. Worse on upper extremities and torso   Neurological:      Mental Status: She is alert.         ED Course     ED Course as of Jul 27 0826   Tue Jul 27, 2021   0448 Patient has had improvement of her rash.  She has no rash on her upper extremities or face now, still with hives on her lower extremities and abdomen.  Her throat feels less sore and she has no facial swelling and no respiratory distress.  Return " precautions discussed as detailed in the AVS.  She expressed understanding.        Procedures              Critical Care time:  none               No results found for this or any previous visit (from the past 24 hour(s)).    Medications   famotidine (PEPCID) tablet 20 mg (20 mg Oral Given 7/27/21 0155)   diphenhydrAMINE (BENADRYL) capsule 50 mg (50 mg Oral Given 7/27/21 0155)   predniSONE (DELTASONE) tablet 60 mg (60 mg Oral Given 7/27/21 0155)       Assessments & Plan (with Medical Decision Making)     I have reviewed the nursing notes.    I have reviewed the findings, diagnosis, plan and need for follow up with the patient.   Ms Villalobos is a 35 yo woman who presents to the emergency department with rash and hives.  Unknown what the cause was.  She does have known reaction to peanuts and tree nuts however does not believe she was exposed and the reaction occurred about an hour after she ate.  She did not have any overt signs of anaphylaxis such as angioedema, wheezing, vomiting, hypotension.  She does have some nausea and scratchy throat and is mildly tachycardic.  Discussed epinephrine with her, she does have a history of severe allergies but this is not her typical angioedema reaction, she does not have an urgent indication for epinephrine at this time however she does have some borderline symptoms.  She preferred to trial Benadryl, Pepcid, prednisone and watch and wait, consider epinephrine for worsening symptoms.  After observation she improved with the rash resolving on her arms and chest but still with scattered hives on her abdomen and thighs.  Her lungs remain clear and her vital signs are within normal limits.  She preferred to go home over further observation.  Discussed Benadryl every 6 hours, short burst of prednisone.  I prescribed an EpiPen and discussed symptoms of anaphylaxis such as angioedema, throat tightness, chest tightness, shortness of breath, vomiting, dizziness, or other concerning symptoms.  She expressed understanding.  Isa Howell MD on 7/27/2021 at 8:26 AM     Discharge Medication List as of 7/27/2021  5:03 AM      START taking these medications    Details   diphenhydrAMINE (BENADRYL) 50 MG capsule Take 1 capsule (50 mg) by mouth every 6 hours as needed for itching or allergies, Disp-12 capsule, R-0, E-Prescribe      EPINEPHrine (ANY BX GENERIC EQUIV) 0.3 MG/0.3ML injection 2-pack Inject 0.3 mLs (0.3 mg) into the muscle once as needed for anaphylaxis, Disp-1 each, R-0, E-Prescribe      predniSONE (DELTASONE) 20 MG tablet Take 3 tablets (60 mg) by mouth daily for 2 days, Disp-6 tablet, R-0, E-Prescribe             Final diagnoses:   Allergic reaction, initial encounter   Hives       7/26/2021   [facility]     Isa Howell MD  07/27/21 0811

## 2021-07-27 NOTE — DISCHARGE INSTRUCTIONS
Take Benadryl 50 mg every 6 hours as needed for allergies.  This will make you sleepy.  Do not drive or drink alcohol on this medication  Take prednisone 60 mg once daily for the next 2 days  Call your doctor tomorrow to schedule an appointment for follow-up within the next 3 to 5 days  If you develop facial swelling, difficulty breathing, vomiting, loss of consciousness, or other concerning symptoms please give yourself an EpiPen injection and return to the emergency department immediately.

## 2022-01-08 NOTE — PATIENT INSTRUCTIONS
Acute Bronchitis with asthma exacerbation  Chest XR negative for pneumonia  Rest, fluids  Humidified air  Start medrol dose pack, take as directed  Albuterol Neb or inhaler every 4-6 hours as needed for cough, wheezing  Benzonatate 200 mg oral tablet, take 3 times/day as needed for cough  OTC guaifenesin with dextromethorphan (Mucinex/robitussin DM) take as directed  Return to clinic if symptoms persist or worsen  Seek immediate care for    Coughing up blood    Worsening weakness, drowsiness, headache, or stiff neck    Trouble breathing, wheezing, or pain with breathing    Patient Education     What Is Acute Bronchitis?  Acute bronchitis is when the airways in your lungs (bronchial tubes) become red and swollen (inflamed). It is usually caused by a viral infection. But it can also occur because of a bacteria or allergen. Symptoms include a cough that produces yellow or greenish mucus and can last for days or sometimes weeks.  Inside healthy lungs    Air travels in and out of the lungs through the airways. The linings of these airways produce sticky mucus. This mucus traps particles that enter the lungs. Tiny structures called cilia then sweep the particles out of the airways.     Healthy airway: Airways are normally open. Air moves in and out easily.      Healthy cilia: Tiny, hairlike cilia sweep mucus and particles up and out of the airways.     Lungs with bronchitis  Bronchitis often occurs with a cold or the flu virus. The airways become inflamed (red and swollen). There is a deep hacking cough from the extra mucus. Other symptoms may include:    Wheezing    Chest discomfort    Shortness of breath    Mild fever  A second infection, this time due to bacteria, may then occur. And airways irritated by allergens or smoke are more likely to get infected.        Inflamed airway: Inflammation and extra mucus narrow the airway, causing shortness of breath.      Impaired cilia: Extra mucus impairs cilia, causing  congestion and wheezing. Smoking makes the problem worse.     Making a diagnosis  A physical exam, health history, and certain tests help your healthcare provider make the diagnosis.  Health history  Your healthcare provider will ask you about your symptoms.  The exam  Your provider listens to your chest for signs of congestion. He or she may also check your ears, nose, and throat.  Possible tests    A sputum test for bacteria. This requires a sample of mucus from your lungs.    A nasal or throat swab. This tests to see if you have a bacterial infection.    A chest X-ray. This is done if your healthcare provider thinks you have pneumonia.    Tests to check for an underlying condition. Other tests may be done to check for things such as allergies, asthma, or COPD (chronic obstructive pulmonary disease). You may need to see a specialist for more lung function testing.  Treating a cough  The main treatment for bronchitis is easing symptoms. Avoiding smoke, allergens, and other things that trigger coughing can often help. If the infection is bacterial, you may be given antibiotics. During the illness, it's important to get plenty of sleep. To ease symptoms:    Don t smoke. Also avoid secondhand smoke.    Use a humidifier. Or try breathing in steam from a hot shower. This may help loosen mucus.    Drink a lot of water and juice. They can soothe the throat and may help thin mucus.    Sit up or use extra pillows when in bed. This helps to lessen coughing and congestion.    Ask your provider about using medicine. Ask about using cough medicine, pain and fever medicine, or a decongestant.  Antibiotics  Most cases of bronchitis are caused by cold or flu viruses. They don t need antibiotics to treat them, even if your mucus is thick and green or yellow. Antibiotics don t treat viral illness and antibiotics have not been shown to have any benefit in cases of acute bronchitis. Taking antibiotics when they are not needed  increases your risk of getting an infection later that is antibiotic-resistant. Antibiotics can also cause severe cases of diarrhea that require other antibiotics to treat.  It is important that you accept your healthcare provider's opinion to not use antibiotics. Your provider will prescribe antibiotics if the infection is caused by bacteria. If they are prescribed:    Take all of the medicine. Take the medicine until it is used up, even if symptoms have improved. If you don t, the bronchitis may come back.    Take the medicines as directed. For instance, some medicines should be taken with food.    Ask about side effects. Ask your provider or pharmacist what side effects are common, and what to do about them.  Follow-up care  You should see your provider again in 2 to 3 weeks. By this time, symptoms should have improved. An infection that lasts longer may mean you have a more serious problem.  Prevention    Avoid tobacco smoke. If you smoke, quit. Stay away from smoky places. Ask friends and family not to smoke around you, or in your home or car.    Get checked for allergies.    Ask your provider about getting a yearly flu shot. Also ask about pneumococcal or pneumonia shots.    Wash your hands often. This helps reduce the chance of picking up viruses that cause colds and flu.  Call your healthcare provider if:    Symptoms worsen, or you have new symptoms    Breathing problems worsen or  become severe    Symptoms don t get better within a week, or within 3 days of taking antibiotics   Date Last Reviewed: 2/1/2017 2000-2018 [employer]. [address] [place] [address]. All rights reserved. This information is not intended as a substitute for professional medical care. Always follow your healthcare professional's instructions.           Patient Education     Asthma (Adult)  Asthma is a disease where the medium and  small air passages within the lung go into spasm and restrict the flow of  "air. Inflammation and swelling of the airways cause further blockage. During an acute asthma attack, these factors cause trouble breathing, wheezing, cough and chest tightness.    An asthma attack can be triggered by many things. Common triggers include infections such as the common cold, bronchitis, and pneumonia. Irritants such as smoke or pollutants in the air, very cold air, emotional upset, and exercise can also trigger an attack. In many adults with asthma, allergies to dust, mold, pollen and animal dander can cause an asthma attack. Skipping doses of daily asthma medicine can also bring on an asthma attack.  Asthma can be controlled using the proper medicines prescribed by your healthcare provider and avoiding exposure to known triggers including allergens and irritants.  Home care    Take prescribed medicine exactly at the times advised. If you need medicine such as from a hand held inhaler or aerosol breathing machine more than every 4 hours, contact your healthcare provider or seek immediate medical attention. If prescribed an antibiotic or prednisone, take all of the medicine as prescribed, even if you are feeling better after a few days.    Don't smoke. Avoid being exposed to the smoke of others.    Some people with asthma have worsening of their symptoms when they take aspirin and non-steroidal or fever-reducing medicines like ibuprofen and naproxen. Talk to your healthcare provider if you think this may apply to you.  Follow-up care  Follow up with your healthcare provider, or as advised. Always bring all of your current medicines to any appointments with your healthcare provider. Also bring a complete list of medicines even those not taken for asthma. If you don't already have one, talk to your healthcare provider about developing your own \"Asthma Action Plan.\"  A pneumococcal (pneumonia) vaccine and yearly flu shot (every fall) are recommended. Ask your doctor about this.  When to seek medical " advice  Call your healthcare provider right away if any of these occur:     Increased wheezing or shortness of breath    Need to use your inhalers more often than usual without relief    Fever of 100.4 F (38 C) or higher, or as directed by your healthcare provider    Coughing up lots of dark-colored or bloody sputum (mucus)    Chest pain with each breath    If you use a peak flow meter as part of an Asthma Action Plan, and you are still in the yellow zone (50% to 80%) 15 minutes after using inhaler medicine.  Call 911  Call 911 if any of the following occur    Trouble walking or talking because of shortness of breath    If you use a peak flow meter as part of an Asthma Action Plan and you are still in the red zone (less than 50%) 15 minutes after using inhaler medicine    Lips or fingernails turning gray or blue  Date Last Reviewed: 5/1/2017 2000-2018 The [employer]. [address] [place] [address], [place] [address]. All rights reserved. This information is not intended as a substitute for professional medical care. Always follow your healthcare professional's instructions.            no

## 2022-01-18 ENCOUNTER — OFFICE VISIT (OUTPATIENT)
Dept: FAMILY MEDICINE | Facility: OTHER | Age: 37
End: 2022-01-18
Attending: PHYSICIAN ASSISTANT
Payer: COMMERCIAL

## 2022-01-18 VITALS
SYSTOLIC BLOOD PRESSURE: 136 MMHG | BODY MASS INDEX: 36.57 KG/M2 | WEIGHT: 214.2 LBS | DIASTOLIC BLOOD PRESSURE: 82 MMHG | OXYGEN SATURATION: 98 % | RESPIRATION RATE: 12 BRPM | HEIGHT: 64 IN | TEMPERATURE: 97.5 F | HEART RATE: 94 BPM

## 2022-01-18 DIAGNOSIS — G43.909 MIGRAINE WITHOUT STATUS MIGRAINOSUS, NOT INTRACTABLE, UNSPECIFIED MIGRAINE TYPE: Primary | ICD-10-CM

## 2022-01-18 PROCEDURE — 250N000009 HC RX 250: Performed by: PHYSICIAN ASSISTANT

## 2022-01-18 PROCEDURE — 250N000011 HC RX IP 250 OP 636: Performed by: PHYSICIAN ASSISTANT

## 2022-01-18 PROCEDURE — 96372 THER/PROPH/DIAG INJ SC/IM: CPT | Performed by: PHYSICIAN ASSISTANT

## 2022-01-18 PROCEDURE — 99213 OFFICE O/P EST LOW 20 MIN: CPT | Performed by: PHYSICIAN ASSISTANT

## 2022-01-18 RX ORDER — ONDANSETRON 4 MG/1
4 TABLET, ORALLY DISINTEGRATING ORAL EVERY 6 HOURS PRN
Status: DISCONTINUED | OUTPATIENT
Start: 2022-01-18 | End: 2022-03-21

## 2022-01-18 RX ORDER — DEXAMETHASONE SODIUM PHOSPHATE 4 MG/ML
10 VIAL (ML) INJECTION ONCE
Status: COMPLETED | OUTPATIENT
Start: 2022-01-18 | End: 2022-01-18

## 2022-01-18 RX ORDER — KETOROLAC TROMETHAMINE 30 MG/ML
60 INJECTION, SOLUTION INTRAMUSCULAR; INTRAVENOUS ONCE
Status: COMPLETED | OUTPATIENT
Start: 2022-01-18 | End: 2022-01-18

## 2022-01-18 RX ORDER — DIPHENHYDRAMINE HCL 25 MG
25 CAPSULE ORAL EVERY 6 HOURS PRN
Status: DISCONTINUED | OUTPATIENT
Start: 2022-01-18 | End: 2022-04-07

## 2022-01-18 RX ORDER — SUMATRIPTAN 50 MG/1
50 TABLET, FILM COATED ORAL
Qty: 90 TABLET | Refills: 3 | Status: SHIPPED | OUTPATIENT
Start: 2022-01-18

## 2022-01-18 RX ADMIN — DEXAMETHASONE SODIUM PHOSPHATE 10 MG: 4 INJECTION, SOLUTION INTRAMUSCULAR; INTRAVENOUS at 14:58

## 2022-01-18 RX ADMIN — KETOROLAC TROMETHAMINE 60 MG: 30 INJECTION, SOLUTION INTRAMUSCULAR at 15:00

## 2022-01-18 ASSESSMENT — PAIN SCALES - GENERAL: PAINLEVEL: SEVERE PAIN (7)

## 2022-01-18 ASSESSMENT — MIFFLIN-ST. JEOR: SCORE: 1651.6

## 2022-01-18 NOTE — PROGRESS NOTES
Assessment & Plan     1. Migraine without status migrainosus, not intractable, unspecified migraine type  Longstanding history of migraines. Currently having an active migraine so cocktail was given as below. Refilled Imitrex that she has done well with this medication as needed in the past. Follow-up as needed.  - ketorolac (TORADOL) injection 60 mg  - dexamethasone (DECADRON) injectable solution used ORALLY 10 mg  - ondansetron (ZOFRAN-ODT) ODT tab 4 mg  - diphenhydrAMINE (BENADRYL) capsule 25 mg  - SUMAtriptan (IMITREX) 50 MG tablet; Take 1 tablet (50 mg) by mouth at onset of headache for migraine May repeat in 2 hours. Max 4 tablets/24 hours.  Dispense: 90 tablet; Refill: 3      No follow-ups on file.    Vonda Godoson PA-C  [facility]   Suzi is a 36 year old who presents for the following health issues     HPI   Here for follow-up on migraines. She had been seen previously for migraines in 6/2021. At that time she was transitioned to Imitrex and given a migraine cocktail. She did have good relief with the cocktail and did well with the Imitrex as needed. Patient reports that she actively has a significant migraine. This began yesterday. She did take one of her older Imitrex tablets but threw it up as she was vomiting due to the pain level. Reports her pain was at a 9 out of 10 on the pain severity scale last night. She is requesting refill of Imitrex that she has been doing well with them. No fever/chills, numbness or tingling, weakness, difficulties with memory or speech, vision changes.    PAST MEDICAL HISTORY:   Past Medical History:   Diagnosis Date     Endometriosis      Hashimoto's thyroiditis        PAST SURGICAL HISTORY:   Past Surgical History:   Procedure Laterality Date     C/SECTION, LOW TRANSVERSE       CLOSED RX BIG TOE FRACTURE  2009    hardware     EXCISE NODULE THYROID       LEEP TX, CERVICAL  2003       FAMILY HISTORY: No family history on  "file.    SOCIAL HISTORY:   Social History     Tobacco Use     Smoking status: Never Smoker     Smokeless tobacco: Never Used   Substance Use Topics     Alcohol use: Yes     Comment: rare        Allergies   Allergen Reactions     Penicillins Anaphylaxis     Current Outpatient Medications   Medication     [employer] MELATONIN GUMMIES PO     EPINEPHrine (ANY BX GENERIC EQUIV) 0.3 MG/0.3ML injection 2-pack     SUMAtriptan (IMITREX) 50 MG tablet     Acetaminophen (TYLENOL ARTHRITIS EXT RELIEF OR)     ondansetron (ZOFRAN-ODT) 4 MG ODT tab     Current Facility-Administered Medications   Medication     dexamethasone (DECADRON) injectable solution used ORALLY 10 mg     diphenhydrAMINE (BENADRYL) capsule 25 mg     ketorolac (TORADOL) injection 60 mg     ondansetron (ZOFRAN-ODT) ODT tab 4 mg         Review of Systems   Per HPI        Objective    /82   Pulse 94   Temp 97.5  F (36.4  C)   Resp 12   Ht 1.626 m (5' 4\")   Wt 97.2 kg (214 lb 3.2 oz)   LMP 01/04/2022   SpO2 98%   Breastfeeding No   BMI 36.77 kg/m    Body mass index is 36.77 kg/m .  Physical Exam   General: Pleasant, in no apparent distress.  Eyes: Sclera are white and conjunctiva are clear bilaterally. Lacrimal apparatus free of erythema, edema, and discharge bilaterally.  Neurologic Exam: Non-focal, symmetric DTRs, normal gross motor, tone coordination and no visible tremor.  Psych: Appropriate mood and affect.            "

## 2022-01-18 NOTE — NURSING NOTE
Patient presents to clinic with headache x 4 days  Tory Peres LPN ....................  1/18/2022   2:35 PM

## 2022-03-20 ENCOUNTER — APPOINTMENT (OUTPATIENT)
Dept: CT IMAGING | Facility: OTHER | Age: 37
End: 2022-03-20
Attending: STUDENT IN AN ORGANIZED HEALTH CARE EDUCATION/TRAINING PROGRAM
Payer: COMMERCIAL

## 2022-03-20 ENCOUNTER — HOSPITAL ENCOUNTER (EMERGENCY)
Facility: OTHER | Age: 37
Discharge: HOME | End: 2022-03-21
Attending: STUDENT IN AN ORGANIZED HEALTH CARE EDUCATION/TRAINING PROGRAM | Admitting: STUDENT IN AN ORGANIZED HEALTH CARE EDUCATION/TRAINING PROGRAM
Payer: COMMERCIAL

## 2022-03-20 DIAGNOSIS — N83.202 HEMORRHAGIC CYST OF LEFT OVARY: ICD-10-CM

## 2022-03-20 PROCEDURE — 74177 CT ABD & PELVIS W/CONTRAST: CPT | Mod: TC

## 2022-03-20 PROCEDURE — 96374 THER/PROPH/DIAG INJ IV PUSH: CPT | Mod: XU | Performed by: STUDENT IN AN ORGANIZED HEALTH CARE EDUCATION/TRAINING PROGRAM

## 2022-03-20 PROCEDURE — 99285 EMERGENCY DEPT VISIT HI MDM: CPT | Mod: 25 | Performed by: STUDENT IN AN ORGANIZED HEALTH CARE EDUCATION/TRAINING PROGRAM

## 2022-03-20 PROCEDURE — 99283 EMERGENCY DEPT VISIT LOW MDM: CPT | Performed by: STUDENT IN AN ORGANIZED HEALTH CARE EDUCATION/TRAINING PROGRAM

## 2022-03-20 RX ORDER — ONDANSETRON 4 MG/1
4 TABLET, ORALLY DISINTEGRATING ORAL ONCE
Status: CANCELLED | OUTPATIENT
Start: 2022-03-20 | End: 2022-03-20

## 2022-03-20 RX ORDER — KETOROLAC TROMETHAMINE 15 MG/ML
15 INJECTION, SOLUTION INTRAMUSCULAR; INTRAVENOUS ONCE
Status: COMPLETED | OUTPATIENT
Start: 2022-03-20 | End: 2022-03-21

## 2022-03-21 ENCOUNTER — APPOINTMENT (OUTPATIENT)
Dept: ULTRASOUND IMAGING | Facility: OTHER | Age: 37
End: 2022-03-21
Attending: STUDENT IN AN ORGANIZED HEALTH CARE EDUCATION/TRAINING PROGRAM
Payer: COMMERCIAL

## 2022-03-21 VITALS
OXYGEN SATURATION: 98 % | HEART RATE: 77 BPM | BODY MASS INDEX: 35.85 KG/M2 | HEIGHT: 64 IN | SYSTOLIC BLOOD PRESSURE: 112 MMHG | WEIGHT: 210 LBS | RESPIRATION RATE: 20 BRPM | TEMPERATURE: 99 F | DIASTOLIC BLOOD PRESSURE: 76 MMHG

## 2022-03-21 LAB
ALBUMIN SERPL-MCNC: 4.5 G/DL (ref 3.5–5.7)
ALBUMIN UR-MCNC: NEGATIVE MG/DL
ALP SERPL-CCNC: 43 U/L (ref 34–104)
ALT SERPL W P-5'-P-CCNC: 21 U/L (ref 7–52)
ANION GAP SERPL CALCULATED.3IONS-SCNC: 7 MMOL/L (ref 3–14)
APPEARANCE UR: CLEAR
AST SERPL W P-5'-P-CCNC: 15 U/L (ref 13–39)
BASOPHILS # BLD AUTO: 0 10E3/UL (ref 0–0.2)
BASOPHILS NFR BLD AUTO: 0 %
BILIRUB SERPL-MCNC: 0.7 MG/DL (ref 0.3–1)
BILIRUB UR QL STRIP: NEGATIVE
BUN SERPL-MCNC: 13 MG/DL (ref 7–25)
CALCIUM SERPL-MCNC: 9.4 MG/DL (ref 8.6–10.3)
CHLORIDE BLD-SCNC: 102 MMOL/L (ref 98–107)
CO2 SERPL-SCNC: 27 MMOL/L (ref 21–31)
COLOR UR AUTO: NORMAL
CREAT SERPL-MCNC: 0.69 MG/DL (ref 0.6–1.2)
CRP SERPL-MCNC: 12.1 MG/L
EOSINOPHIL # BLD AUTO: 0.1 10E3/UL (ref 0–0.7)
EOSINOPHIL NFR BLD AUTO: 1 %
ERYTHROCYTE [DISTWIDTH] IN BLOOD BY AUTOMATED COUNT: 11.9 % (ref 10–15)
GFR SERPL CREATININE-BSD FRML MDRD: >90 ML/MIN/1.73M2
GLUCOSE BLD-MCNC: 133 MG/DL (ref 70–105)
GLUCOSE UR STRIP-MCNC: NEGATIVE MG/DL
HCG SERPL QL: NEGATIVE
HCT VFR BLD AUTO: 38.4 % (ref 35–47)
HGB BLD-MCNC: 12.7 G/DL (ref 11.7–15.7)
HGB UR QL STRIP: NEGATIVE
IMM GRANULOCYTES # BLD: 0 10E3/UL
IMM GRANULOCYTES NFR BLD: 0 %
KETONES UR STRIP-MCNC: NEGATIVE MG/DL
LEUKOCYTE ESTERASE UR QL STRIP: NEGATIVE
LIPASE SERPL-CCNC: 26 U/L (ref 11–82)
LYMPHOCYTES # BLD AUTO: 3.3 10E3/UL (ref 0.8–5.3)
LYMPHOCYTES NFR BLD AUTO: 36 %
MCH RBC QN AUTO: 30.6 PG (ref 26.5–33)
MCHC RBC AUTO-ENTMCNC: 33.1 G/DL (ref 31.5–36.5)
MCV RBC AUTO: 93 FL (ref 78–100)
MONOCYTES # BLD AUTO: 0.7 10E3/UL (ref 0–1.3)
MONOCYTES NFR BLD AUTO: 7 %
NEUTROPHILS # BLD AUTO: 5.1 10E3/UL (ref 1.6–8.3)
NEUTROPHILS NFR BLD AUTO: 56 %
NITRATE UR QL: NEGATIVE
NRBC # BLD AUTO: 0 10E3/UL
NRBC BLD AUTO-RTO: 0 /100
PH UR STRIP: 6.5 [PH] (ref 5–9)
PLATELET # BLD AUTO: 336 10E3/UL (ref 150–450)
POTASSIUM BLD-SCNC: 3.6 MMOL/L (ref 3.5–5.1)
PROT SERPL-MCNC: 7.1 G/DL (ref 6.4–8.9)
RBC # BLD AUTO: 4.15 10E6/UL (ref 3.8–5.2)
SODIUM SERPL-SCNC: 136 MMOL/L (ref 134–144)
SP GR UR STRIP: 1.02 (ref 1–1.03)
UROBILINOGEN UR STRIP-MCNC: NORMAL MG/DL
WBC # BLD AUTO: 9.3 10E3/UL (ref 4–11)

## 2022-03-21 PROCEDURE — 86140 C-REACTIVE PROTEIN: CPT | Performed by: STUDENT IN AN ORGANIZED HEALTH CARE EDUCATION/TRAINING PROGRAM

## 2022-03-21 PROCEDURE — 258N000003 HC RX IP 258 OP 636: Performed by: STUDENT IN AN ORGANIZED HEALTH CARE EDUCATION/TRAINING PROGRAM

## 2022-03-21 PROCEDURE — 76830 TRANSVAGINAL US NON-OB: CPT | Mod: TC

## 2022-03-21 PROCEDURE — 80053 COMPREHEN METABOLIC PANEL: CPT | Performed by: STUDENT IN AN ORGANIZED HEALTH CARE EDUCATION/TRAINING PROGRAM

## 2022-03-21 PROCEDURE — 250N000011 HC RX IP 250 OP 636: Performed by: STUDENT IN AN ORGANIZED HEALTH CARE EDUCATION/TRAINING PROGRAM

## 2022-03-21 PROCEDURE — 84703 CHORIONIC GONADOTROPIN ASSAY: CPT | Performed by: STUDENT IN AN ORGANIZED HEALTH CARE EDUCATION/TRAINING PROGRAM

## 2022-03-21 PROCEDURE — 36415 COLL VENOUS BLD VENIPUNCTURE: CPT | Performed by: STUDENT IN AN ORGANIZED HEALTH CARE EDUCATION/TRAINING PROGRAM

## 2022-03-21 PROCEDURE — 83690 ASSAY OF LIPASE: CPT | Performed by: STUDENT IN AN ORGANIZED HEALTH CARE EDUCATION/TRAINING PROGRAM

## 2022-03-21 PROCEDURE — 85025 COMPLETE CBC W/AUTO DIFF WBC: CPT | Performed by: STUDENT IN AN ORGANIZED HEALTH CARE EDUCATION/TRAINING PROGRAM

## 2022-03-21 PROCEDURE — 81003 URINALYSIS AUTO W/O SCOPE: CPT | Performed by: STUDENT IN AN ORGANIZED HEALTH CARE EDUCATION/TRAINING PROGRAM

## 2022-03-21 RX ORDER — OXYCODONE HYDROCHLORIDE 5 MG/1
5-10 TABLET ORAL EVERY 8 HOURS PRN
Qty: 15 TABLET | Refills: 0 | Status: SHIPPED | OUTPATIENT
Start: 2022-03-21 | End: 2022-03-24

## 2022-03-21 RX ORDER — ACETAMINOPHEN 325 MG/1
975 TABLET ORAL ONCE
Status: DISCONTINUED | OUTPATIENT
Start: 2022-03-21 | End: 2022-03-21 | Stop reason: HOSPADM

## 2022-03-21 RX ORDER — IOPAMIDOL 755 MG/ML
100 INJECTION, SOLUTION INTRAVASCULAR ONCE
Status: COMPLETED | OUTPATIENT
Start: 2022-03-21 | End: 2022-03-21

## 2022-03-21 RX ORDER — ONDANSETRON 4 MG/1
4 TABLET, ORALLY DISINTEGRATING ORAL EVERY 6 HOURS PRN
Qty: 12 TABLET | Refills: 0 | Status: SHIPPED | OUTPATIENT
Start: 2022-03-21 | End: 2022-03-24

## 2022-03-21 RX ADMIN — SODIUM CHLORIDE 1000 ML: 9 INJECTION, SOLUTION INTRAVENOUS at 00:07

## 2022-03-21 RX ADMIN — IOPAMIDOL 100 ML: 755 INJECTION, SOLUTION INTRAVENOUS at 00:57

## 2022-03-21 RX ADMIN — KETOROLAC TROMETHAMINE 15 MG: 15 INJECTION, SOLUTION INTRAMUSCULAR; INTRAVENOUS at 00:12

## 2022-03-21 NOTE — ED PROVIDER NOTES
"  History     Chief Complaint   Patient presents with     Abdominal Pain     HPI  Suzi Miller is a 36 year old female with PMH remarkable for Hashimoto's thyroiditis, endometriosis, s/p LEEP and s/p 3x caesarian section who presents with left sided abdominal pain and nausea. Patient reports that the pain initially began around two weeks ago and was located in the left groin region at that time. Since then, the pain has been constantly worsening and progressing, describing it as \"feeling full with pain and stabbing\". It is now still present in the left groin but now is present diffusely across left anterior abdomen and left flank. She is unsure of any inciting symptoms or recent changes. Denies urinary changes including blood. No vaginal discharge. She did have a few episodes of diarrhea and vomiting last week that she attributes to an unrelated \"GI bug\" that have both resolved. No blood or mucus noted in either. Has not had any fevers, reflux symptoms, chest pain, or shortness of breath. She is expecting her next menstrual period in 10 days. Does not believe she has contracted an sexually transmitted infection.     Allergies:  Allergies   Allergen Reactions     Penicillins Anaphylaxis       Problem List:    Patient Active Problem List    Diagnosis Date Noted     Hashimoto's disease 02/28/2019     Priority: Medium     Restrictive airway disease 05/01/2007     Priority: Medium     Endometriosis 06/01/2002     Priority: Medium        Past Medical History:    Past Medical History:   Diagnosis Date     Endometriosis      Hashimoto's thyroiditis        Past Surgical History:    Past Surgical History:   Procedure Laterality Date     C/SECTION, LOW TRANSVERSE       CLOSED RX BIG TOE FRACTURE  2009    hardware     EXCISE NODULE THYROID       LEEP TX, CERVICAL  2003       Family History:    History reviewed. No pertinent family history.    Social History:  Marital Status:   [2]  Social History     Tobacco Use     " "Smoking status: Never Smoker     Smokeless tobacco: Never Used   Vaping Use     Vaping Use: Never used   Substance Use Topics     Alcohol use: Yes     Comment: rare     Drug use: Never        Medications:    Acetaminophen (TYLENOL ARTHRITIS EXT RELIEF OR)  CVS MELATONIN GUMMIES PO  EPINEPHrine (ANY BX GENERIC EQUIV) 0.3 MG/0.3ML injection 2-pack  ondansetron (ZOFRAN-ODT) 4 MG ODT tab  SUMAtriptan (IMITREX) 50 MG tablet          Review of Systems   Please see HPI for pertinent positives and negatives.  All other systems reviewed and found to be negative.      Physical Exam   BP: 134/87  Pulse: 106  Temp: 98.1  F (36.7  C)  Resp: 24  Height: 162.6 cm (5' 4\")  Weight: 95.3 kg (210 lb)  SpO2: 100 %      Physical Exam   HEENT: Normocephalic and atraumatic. No scleral icterus. No conjunctival injection is noted  NECK: Supple. Trachea is midline.   LUNGS: Breath sounds are equal and clear bilaterally. No wheezes, rhonchi, or rales.  HEART: Regular rate and rhythm with normal S1 and S2. No murmurs, gallops, or rubs.  ABDOMEN: Soft and flat. No mass, tenderness, guarding, or rebound. No organomegaly appreciated. Bowel sounds are present. No CVA tenderness or flank mass.  SKIN/EXTREMITIES: No cyanosis, clubbing, or edema. Warm, dry, and well perfused. Good turgor. No lesions, nodules or rashes are noted. No onychomycosis.  MSK: Grossly moves all extremities. No lower extremity edema appreciated   NEUROLOGIC: No focal sensory or motor deficits are noted. Gait is normal.  PSYCHIATRIC: The patient is awake, alert, and oriented x3. Recent and remote memory is intact. Appropriate mood and affect.        ED Course            Procedures              Results for orders placed or performed during the hospital encounter of 03/20/22 (from the past 24 hour(s))   UA reflex to Microscopic   Result Value Ref Range    Color Urine Light Yellow Colorless, Straw, Light Yellow, Yellow    Appearance Urine Clear Clear    Glucose Urine Negative " Negative mg/dL    Bilirubin Urine Negative Negative    Ketones Urine Negative Negative mg/dL    Specific Gravity Urine 1.017 1.000 - 1.030    Blood Urine Negative Negative    pH Urine 6.5 5.0 - 9.0    Protein Albumin Urine Negative Negative mg/dL    Urobilinogen Urine Normal Normal, 2.0 mg/dL    Nitrite Urine Negative Negative    Leukocyte Esterase Urine Negative Negative    Narrative    Microscopic not indicated   Comprehensive metabolic panel   Result Value Ref Range    Sodium 136 134 - 144 mmol/L    Potassium 3.6 3.5 - 5.1 mmol/L    Chloride 102 98 - 107 mmol/L    Carbon Dioxide (CO2) 27 21 - 31 mmol/L    Anion Gap 7 3 - 14 mmol/L    Urea Nitrogen 13 7 - 25 mg/dL    Creatinine 0.69 0.60 - 1.20 mg/dL    Calcium 9.4 8.6 - 10.3 mg/dL    Glucose 133 (H) 70 - 105 mg/dL    Alkaline Phosphatase 43 34 - 104 U/L    AST 15 13 - 39 U/L    ALT 21 7 - 52 U/L    Protein Total 7.1 6.4 - 8.9 g/dL    Albumin 4.5 3.5 - 5.7 g/dL    Bilirubin Total 0.7 0.3 - 1.0 mg/dL    GFR Estimate >90 >60 mL/min/1.73m2   CBC with platelets differential    Narrative    The following orders were created for panel order CBC with platelets differential.  Procedure                               Abnormality         Status                     ---------                               -----------         ------                     CBC with platelets and d...[[id number]]                      Final result                 Please view results for these tests on the individual orders.   CRP inflammation   Result Value Ref Range    CRP Inflammation 12.1 (H) <10.0 mg/L   Lipase   Result Value Ref Range    Lipase 26 11 - 82 U/L   HCG qualitative   Result Value Ref Range    hCG Serum Qualitative Negative Negative   CBC with platelets and differential   Result Value Ref Range    WBC Count 9.3 4.0 - 11.0 10e3/uL    RBC Count 4.15 3.80 - 5.20 10e6/uL    Hemoglobin 12.7 11.7 - 15.7 g/dL    Hematocrit 38.4 35.0 - 47.0 %    MCV 93 78 - 100 fL    MCH 30.6 26.5 - 33.0 pg     MCHC 33.1 31.5 - 36.5 g/dL    RDW 11.9 10.0 - 15.0 %    Platelet Count 336 150 - 450 10e3/uL    % Neutrophils 56 %    % Lymphocytes 36 %    % Monocytes 7 %    % Eosinophils 1 %    % Basophils 0 %    % Immature Granulocytes 0 %    NRBCs per 100 WBC 0 <1 /100    Absolute Neutrophils 5.1 1.6 - 8.3 10e3/uL    Absolute Lymphocytes 3.3 0.8 - 5.3 10e3/uL    Absolute Monocytes 0.7 0.0 - 1.3 10e3/uL    Absolute Eosinophils 0.1 0.0 - 0.7 10e3/uL    Absolute Basophils 0.0 0.0 - 0.2 10e3/uL    Absolute Immature Granulocytes 0.0 <=0.4 10e3/uL    Absolute NRBCs 0.0 10e3/uL   CT Abdomen Pelvis w Contrast    Narrative    PROCEDURE INFORMATION:   Exam: CT Abdomen And Pelvis With Contrast   Exam date and time: 3/21/2022 12:56 AM   Age: 36 years old   Clinical indication: Abdominal pain; Localized; Left lower quadrant (llq);   Prior surgery; Surgery date: 6+ months; Surgery type: HX  3x     TECHNIQUE:   Imaging protocol: Computed tomography of the abdomen and pelvis with contrast.   Radiation optimization: All CT scans at this facility use at least one of these   dose optimization techniques: automated exposure control; mA and/or kV   adjustment per patient size (includes targeted exams where dose is matched to   clinical indication); or iterative reconstruction.   Contrast material: ISOVUE 370; Contrast volume: 100 ml; Contrast route:   INTRAVENOUS (IV);      COMPARISON:   CT ABDOMEN PELVIS W/O CONTRAST 2/3/2021 9:49 AM     FINDINGS:   Lungs: Subsegmental atelectasis or parenchymal scarring.      Liver: The liver measures approximately 18 cm in craniocaudal dimension near   the midclavicular line, with a lobular contour.   Gallbladder and bile ducts: No calcified stones. No ductal dilation. No   pericholecystic fluid or gallbladder wall thickening.   Pancreas: Normal. No ductal dilation.   Spleen: Normal. No splenomegaly.   Adrenal glands: Normal. No mass.   Kidneys and ureters: The kidneys are symmetric in size, with  symmetric   nephrograms. No renal pelvic calculi, or pelvicaliectasis. Subcentimeter renal   cortical hypodensities are too small to further characterize by CT.   Stomach and bowel: No obstruction. No mucosal thickening.   Appendix: No evidence of appendicitis.     Intraperitoneal space: No intraperitoneal free air. No significant fluid   collection.   Vasculature: No abdominal aortic aneurysm.   Lymph nodes: No bulky inguinal, pelvic, mesenteric, or retroperitoneal   lymphadenopathy.   Urinary bladder: Partially distended and appears normal.   Reproductive: The uterus is normal in size. Left adnexal cystic lesion   measuring 4 cm.   Bones/joints: Multilevel osseous degenerative changes. Sclerosis of the   bilateral sacroiliac joints, stable.   Soft tissues: Fat containing umbilical hernia with a neck of approximately 1   cm.       Impression    IMPRESSION:   1. Left adnexal simple cystic lesion. No follow-up imaging is recommended.   (Reference: Giancarlo) This recommendation is not used when a patient develops   symptoms potentially related to a mass being followed; an asymptomatic cyst may   become painful because of internal hemorrhage, rupture, or torsion, with   symptoms justifying immediate imaging attention.   2. Hepatomegaly with a lobular contour.   3. Stable osseous sequela of prior bilateral sacroiliitis.     REFERENCES:   Giancarlo et al. Management of Incidental Adnexal Findings on CT and MRI: A White   Paper of the ACR Incidental Findings Committee, J Am Gilda Radiol. 2020   Feb;17(2):248-254.     THIS DOCUMENT HAS BEEN ELECTRONICALLY SIGNED BY YSBAEL VILLALTA MD   US Transvaginal Non OB    Narrative    PROCEDURE INFORMATION:   Exam: US Pelvis, Transvaginal   Exam date and time: 3/21/2022 2:01 AM   Age: 36 years old   Clinical indication: Abdominal pain and pelvic pain; Left lower quadrant; Prior   surgery; Surgery date: 6+ months; Surgery type:  x3; Leep; Patient HX:   Pain for 3 weeks; Additional info:  Left adnexal lesion with llq pain on exam,   rule out torsion, evaluate for toa, mass     TECHNIQUE:   Imaging protocol: Real-time transvaginal pelvic ultrasound with image   documentation. Transvaginal imaging was used for better evaluation of the   endometrium, adnexa, and/or cervix.     COMPARISON:   1. CT ABDOMEN PELVIS W CONTRAST 3/21/2022 12:13 AM   2. CT abdomen pelvis 02/03/2021     FINDINGS:   Uterus: The uterus measures approximately 7.9 x 4.9 x 4.3 cm in size including   the cervix. The endometrium appears homogeneous, and 8-9 mm in thickness near   the fundus.   Right ovary/adnexa: The right ovary is not seen.   Left ovary/adnexa: The left ovary measures approximately 4.5 x 3.9 x 3.8   centimetres, and demonstrates an avascular cystic lesion measuring   approximately 3.2 x 3.2 x 3.0 cm. Portions of this cystic lesion appear   homogeneous, with reticular echogenicities superficially. Normal arterial and   venous flow is present on color and spectral Doppler imaging.   Intraperitoneal space: Physiologic volume of free pelvic fluid.       Impression    IMPRESSION:   1. Left adnexal cystic lesion is increased/new compared with 02/03/2021 CT   given differences in imaging technique and favors the appearance of an   endometrioma or evolving hemorrhagic cyst. Initial follow-up ultrasound in 6-12   weeks is recommended to evaluate for endometrioma. If not surgically removed   after initial follow-up, then annual follow-up ultrasound is recommended.   2. Nonvisualization of the right ovary.     REFERENCES:   Janie D, Theodore DL, Antonella RF, et al. Management of asymptomatic ovarian and   other adnexal cysts imaged at US: Society of Radiologists in Ultrasound   Consensus Conference Statement. Radiology. 2010;256(3):943-954.     THIS DOCUMENT HAS BEEN ELECTRONICALLY SIGNED BY YSABEL VILLALTA MD       Medications   0.9% sodium chloride BOLUS (1,000 mLs Intravenous New Bag 3/21/22 0007)   ketorolac (TORADOL) injection 15  mg (has no administration in time range)       Assessments & Plan (with Medical Decision Making)   1. Hemorrhagic cyst of left ovary   CT demonstrated left adnexal simple cystic lesion followed by US which showed left adnexal cystic lesion (is increased/new compared with 02/03/2021 CT). Per radiology report, given differences in imaging technique and favors the appearance of an   endometrioma or evolving hemorrhagic cyst. Remainder of workup including CBC, CMP, UA was unremarkable. Recommended to follow-up with Ob/Gyn for repeat ultrasound to determine if resolution vs monitoring vs surgical removal is necessary. Results were discussed with patient and was given Zofran and Oxycodone for pain and nausea management. Instructed to follow-up with worsening symptoms.     I have reviewed the nursing notes.    I have reviewed the findings, diagnosis, plan and need for follow up with the patient.    New Prescriptions    ONDANSETRON (ZOFRAN ODT) 4 MG ODT TAB    Take 1 tablet (4 mg) by mouth every 6 hours as needed for nausea or vomiting    OXYCODONE (ROXICODONE) 5 MG TABLET    Take 1-2 tablets (5-10 mg) by mouth every 8 hours as needed for severe pain       Final diagnoses:   Hemorrhagic cyst of left ovary     [facility] - Medical Student Year 3     ATTENDING ATTESTATION  I saw and evaluated patient with medical student, see pertinent findings including ED course and MDM below.    ED Course as of 03/21/22 0731   Sun Mar 20, 2022   3912 Patient evaluated, here with left lower quadrant abdominal pain has been present over the last couple of weeks, worsening today and now associated with left flank pain.  Patient reports a history of kidney stones in the past but states this does feel different, also history of endometriosis.  No new vaginal symptoms including no vaginal bleeding or discharge, not concerned for STDs.  Tenderness to palpation the left lower quadrant on exam and radiating along the left side to the left flank.   Plan for CT to evaluate for diverticulitis as well as ureterolithiasis or pyelonephritis although has no urinary symptoms and is afebrile.  Will give Toradol for pain and fluid bolus, took Zofran already and nausea is improving.  Also obtain basic labs and pregnancy testing as well as urinalysis.   Mon Mar 21, 2022   0040 CBC unremarkable, urinalysis without evidence of infection or hematuria   0044 hCG negative   0117 CMP unremarkable, lipase normal. CRP slightly elevated to 12.1   0133 CT with apparent left ovarian lesion, will order ultrasound pelvic to further evaluate   0151 Patient updated on results and plan of care, will give tylenol for pain (she wishes to avoid narcotics due to needing to drive home)   0230 CT abdomen final report:  IMPRESSION:   1. Left adnexal simple cystic lesion. No follow-up imaging is recommended.   (Reference: Giancarlo) This recommendation is not used when a patient develops   symptoms potentially related to a mass being followed; an asymptomatic cyst may   become painful because of internal hemorrhage, rupture, or torsion, with   symptoms justifying immediate imaging attention.   2. Hepatomegaly with a lobular contour.   3. Stable osseous sequela of prior bilateral sacroiliitis.    0242 US transvaginal final report:  IMPRESSION:   1. Left adnexal cystic lesion is increased/new compared with 02/03/2021 CT   given differences in imaging technique and favors the appearance of an   endometrioma or evolving hemorrhagic cyst. Initial follow-up ultrasound in 6-12   weeks is recommended to evaluate for endometrioma. If not surgically removed   after initial follow-up, then annual follow-up ultrasound is recommended.   2. Nonvisualization of the right ovary.      From ED discharge instructions:  You were found to have a likely hemorrhagic cyst of the left ovary that is causing your pain.    Make an appointment to follow-up in OB/GYN clinic here at [facility] soon as possible for an  evaluation to determine next steps.    For pain at home, take Tylenol 1000 mg (2 extra strength tablets) up to 3 times daily, as well as ibuprofen 400 mg every 6 hours as needed.    For severe pain not controlled by tylenol and ibuprofen, take oxycodone 5 to 10 mg every 8 hours as needed.    Take Zofran 4 mg every 6 hours as needed for nausea.    Come back to the ER if severe worsening pain not controlled by these medications at home, new fever, vomiting, or any other acute concerns.        3/20/2022   [facility] Hospital[facility]o Pruitt MD  03/21/22 4119

## 2022-03-21 NOTE — DISCHARGE INSTRUCTIONS
You were found to have a likely hemorrhagic cyst of the left ovary that is causing your pain.    Make an appointment to follow-up in OB/GYN clinic here at [facility] soon as possible for an evaluation to determine next steps.    For pain at home, take Tylenol 1000 mg (2 extra strength tablets) up to 3 times daily, as well as ibuprofen 400 mg every 6 hours as needed.    For severe pain not controlled by tylenol and ibuprofen, take oxycodone 5 to 10 mg every 8 hours as needed.    Take Zofran 4 mg every 6 hours as needed for nausea.    Come back to the ER if severe worsening pain not controlled by these medications at home, new fever, vomiting, or any other acute concerns.

## 2022-03-21 NOTE — ED TRIAGE NOTES
"ED Nursing Triage Note (General)   ________________________________    Suzi Miller is a 36 year old Female that presents to triage private car  With history of  Abdominal pain on lower left side to middle of abdomen reported by patient   Significant symptoms had onset few weeks but worse tonight /87   Pulse 106   Temp 98.1  F (36.7  C) (Temporal)   Resp 24   Ht 1.626 m (5' 4\")   Wt 95.3 kg (210 lb)   LMP 02/16/2022 (Approximate)   SpO2 100%   BMI 36.05 kg/m  t  Patient appears alert  and oriented, in mild distress., and cooperative behavior.    GCS Total = 15  Airway: intact  Breathing noted as Normal  Circulation Normal  Skin:  Normal        PRE HOSPITAL PRIOR LIVING SITUATION Spouse and Children  "

## 2022-03-23 ENCOUNTER — NURSE TRIAGE (OUTPATIENT)
Dept: OBGYN | Facility: OTHER | Age: 37
End: 2022-03-23
Payer: COMMERCIAL

## 2022-03-23 NOTE — TELEPHONE ENCOUNTER
Patient left message on Unit [address] VM stating she was seen in ED on 3/20 and now has increasing pelvic pain. Return call placed to patient. Left message to call back  ....................Nat Perkins RN  3/23/2022   8:20 AM

## 2022-03-23 NOTE — TELEPHONE ENCOUNTER
Patient returned call. She shares that her pain is not getting worse since her ER visit. She is mostly worried that it will get worse as she nears menstruation (next week). Reassured her that her imaging did not show anything emergent, but it does require follow up. Patient has asked to be seen sooner than 4/7. D/t a cancellation, Dr. Loaiza now has availability on 3/30/22. Patient was offered and accepted an appointment. She was advised to present to ER if symptoms change or worsen prior to that.    Nat Perkins RN...................3/23/2022 2:22 PM

## 2022-03-30 ENCOUNTER — OFFICE VISIT (OUTPATIENT)
Dept: OBGYN | Facility: OTHER | Age: 37
End: 2022-03-30
Attending: OBSTETRICS & GYNECOLOGY
Payer: COMMERCIAL

## 2022-03-30 VITALS
SYSTOLIC BLOOD PRESSURE: 150 MMHG | BODY MASS INDEX: 35.7 KG/M2 | WEIGHT: 208 LBS | RESPIRATION RATE: 20 BRPM | HEART RATE: 110 BPM | OXYGEN SATURATION: 99 % | DIASTOLIC BLOOD PRESSURE: 88 MMHG

## 2022-03-30 DIAGNOSIS — N94.6 DYSMENORRHEA: Primary | ICD-10-CM

## 2022-03-30 PROCEDURE — G0463 HOSPITAL OUTPT CLINIC VISIT: HCPCS

## 2022-03-30 PROCEDURE — 99215 OFFICE O/P EST HI 40 MIN: CPT | Performed by: OBSTETRICS & GYNECOLOGY

## 2022-03-30 RX ORDER — ONDANSETRON 4 MG/1
4 TABLET, ORALLY DISINTEGRATING ORAL EVERY 8 HOURS PRN
COMMUNITY
End: 2022-05-21 | Stop reason: ALTCHOICE

## 2022-03-30 RX ORDER — PHENAZOPYRIDINE HYDROCHLORIDE 100 MG/1
200 TABLET, FILM COATED ORAL ONCE
Status: CANCELLED | OUTPATIENT
Start: 2022-03-30 | End: 2022-03-30

## 2022-03-30 RX ORDER — OXYCODONE HYDROCHLORIDE 5 MG/1
5 TABLET ORAL EVERY 6 HOURS PRN
COMMUNITY
End: 2022-04-01

## 2022-03-30 RX ORDER — ACETAMINOPHEN 325 MG/1
975 TABLET ORAL ONCE
Status: CANCELLED | OUTPATIENT
Start: 2022-03-30 | End: 2022-03-30

## 2022-03-30 RX ORDER — CLINDAMYCIN PHOSPHATE 900 MG/50ML
900 INJECTION, SOLUTION INTRAVENOUS SEE ADMIN INSTRUCTIONS
Status: CANCELLED | OUTPATIENT
Start: 2022-03-30

## 2022-03-30 RX ORDER — CLINDAMYCIN PHOSPHATE 900 MG/50ML
900 INJECTION, SOLUTION INTRAVENOUS
Status: CANCELLED | OUTPATIENT
Start: 2022-03-30

## 2022-03-30 RX ORDER — IBUPROFEN 600 MG/1
600 TABLET, FILM COATED ORAL EVERY 8 HOURS PRN
COMMUNITY
End: 2022-11-02

## 2022-03-30 ASSESSMENT — PAIN SCALES - GENERAL: PAINLEVEL: SEVERE PAIN (7)

## 2022-03-30 NOTE — NURSING NOTE
"Chief Complaint   Patient presents with     Consult     ovarian cyst     She was in the ED. She is here to follow up from there for an ovarian cyst.  Latonya Hollins LPN..................3/30/2022   2:21 PM    Initial BP (!) 150/88   Pulse 110   Resp 20   Wt 94.3 kg (208 lb)   LMP 03/27/2022   SpO2 99%   Breastfeeding No   BMI 35.70 kg/m   Estimated body mass index is 35.7 kg/m  as calculated from the following:    Height as of 3/20/22: 1.626 m (5' 4\").    Weight as of this encounter: 94.3 kg (208 lb).  Medication Reconciliation: complete    FOOD SECURITY SCREENING QUESTIONS  Hunger Vital Signs:  Within the past 12 months we worried whether our food would run out before we got money to buy more. Never  Within the past 12 months the food we bought just didn't last and we didn't have money to get more. Never        Advance care directive on file? no      Latonya Hollins LPN  "

## 2022-03-30 NOTE — PROGRESS NOTES
"Date of Surgery: 4/14/22  Type of Surgery: Total Laparoscopic Hysterectomy, Bilateral Salpingectomy, cystoscopy and possible left oophorectomy  Surgeon: Dr. Esperanza Loaiza     appropriate appointments were scheduled by the [facility]  Patient was given surgical folder  which includes pre-operative bathing instructions related to the two packets of Hibiclens surgical prep provided.. Surgical forms were copied and kept for informative purposes. Originals were delivered to Day-surgery. Questions were answered to the best of this nurse's ability.        STOP BANG    Fever/Chills or other infectious symptoms in past month? n  >10 pound weight loss in the past 2 months? n  Health Care Directive on file? n  History of blood transfusions? n  Td up to date? y  History of VRE/MRSA? n      Obstructive Sleep Apnea screening    Preoperative Evaluation: Obstructive Sleep Apnea screening    S: Snore -  Do you snore loudly? (louder than talking or loud enough to be heard through closed doors) No  T: Tired - Do you often feel tired, fatigued, or sleepy during the daytime?No  O: Observed - Has anyone ever observed you stop breathing during your sleep?No  P: Pressure - Do you have or are you being treated for high blood pressure?No  B: BMI - BMI greater than 35kg/m2?Yes  A: Age - Age over 50 years old?no  N: Neck - Neck circumference greater than 40 cm? Yes  G: Gender - Gender: Male?No    Total number of \"YES\" responses:  2    Scoring: Low risk of DAVEY 0-2  At Risk of DAVEY: >3 High Risk of DAVEY: 5-8      Total yes answers in DAVEY section:    Low risk 0-2  At risk 3-4  High risk 5-8    Domonique Loaiza RN............. 3/30/2022 4:05 PM   "

## 2022-03-30 NOTE — PROGRESS NOTES
Gynecology Visit    CC: LLQ pain, heavy menses, dysmenorrhea    HPI:    Suzi Miller is a 36 year old , here for the above concern. She reports her periods have always been regular but very heavy and very painful. They occur ever 28 days, lasting 7 days with 5 days of heavy flow. She needs to change a super plus tampon every few hours. She will pass large clots. She has cramping that starts a week before and persists through her entire period. For the last few months, she has been having left sided sharp pain that became significantly worse on 3/20, which prompted her to present to the ED. There, she was diagnosed with a hemorrhagic cyst. She does not feel like the pain is getting better yet. She tried two Depo shots last year but stopped because it made her migraines worse.     OBHx  OB History    Para Term  AB Living   3 3 3 0 0 3   SAB IAB Ectopic Multiple Live Births   0 0 0 0 0      # Outcome Date GA Lbr Talon/2nd Weight Sex Delivery Anes PTL Lv   3 Term 12    M CS-Unspec      2 Term 09    M CS-Unspec      1 Term 05    M CS-Unspec          PMHx:   Past Medical History:   Diagnosis Date     Endometriosis      Hashimoto's thyroiditis       PSHx:   Past Surgical History:   Procedure Laterality Date     C/SECTION, LOW TRANSVERSE       CLOSED RX BIG TOE FRACTURE  2009    hardware     EXCISE NODULE THYROID       LEEP TX, CERVICAL        Meds:   Current Outpatient Medications   Medication     Acetaminophen (TYLENOL ARTHRITIS EXT RELIEF OR)     APAP-Pamabrom-Pyrilamine (PAMPRIN MULTI-SYMPTOM PO)     CVS MELATONIN GUMMIES PO     EPINEPHrine (ANY BX GENERIC EQUIV) 0.3 MG/0.3ML injection 2-pack     ibuprofen (ADVIL/MOTRIN) 600 MG tablet     ondansetron (ZOFRAN-ODT) 4 MG ODT tab     oxyCODONE (ROXICODONE) 5 MG tablet     SUMAtriptan (IMITREX) 50 MG tablet     Current Facility-Administered Medications   Medication     diphenhydrAMINE (BENADRYL) capsule 25 mg       Allergies:        Allergies   Allergen Reactions     Penicillins Anaphylaxis       SocHx:   Social History     Tobacco Use     Smoking status: Never Smoker     Smokeless tobacco: Never Used   Vaping Use     Vaping Use: Never used   Substance Use Topics     Alcohol use: Yes     Comment: rare     Drug use: Never     Lives with her  and two boys. Is a SAHM    FamHx:   Family History   Problem Relation Age of Onset     Early menopause Mother 41     Heart Disease Father      Heart Disease Paternal Grandmother 62     Early menopause Paternal Grandmother         Physical Exam  BP (!) 150/88   Pulse 110   Resp 20   Wt 94.3 kg (208 lb)   LMP 2022   SpO2 99%   Breastfeeding No   BMI 35.70 kg/m    Gen: Well-appearing, NAD  Resp: nonlabored  Psych: appropriate mood and affect    Pelvic:  Normal appearing external female genitalia. Normal hair distribution. Vagina is without lesions. Small bloody discharge. Cervix normal, no lesions, no cervical motion tenderness. Uterus is small, mobile, non-tender. No adnexal tenderness or masses      Assessment/Plan  Suzi Miller is a 36 year old  female here for LLQ pain, dysmenorrhea, heavy menses. Reviewed ultrasound findings- hemorrhagic cyst vs endometrioma. Discussed that hemorrhagic cysts will resolve on their own with time but can take several weeks. Endometriomas, however, will generally persist. Her history is suspicious for endometriosis but this can only be confirmed surgically. Discussed conservative management with menstrual suppression and follow up imaging in 2 months vs surgical management options. After thorough discussion, she wants to proceed with TLH, BS, cystoscopy, possible left oophorectomy. If endometriosis is found, she desires treatment of it but does not want her ovaries removed unless they are abnormal. If the left ovarian cyst is persistent, she desires oophorectomy but does not want bilateral oophorectomy.     Total time spent 40 minutes     Manuela  MD [facility]  3/30/2022 2:55 PM

## 2022-03-31 NOTE — PROGRESS NOTES
[facility]  [address]  [place] [address]  Phone: [phone]  Fax: [phone]  Primary Provider: No Ref-Primary, Physician  Pre-op Performing Provider: OBI GUEVARA      PREOPERATIVE EVALUATION:  Today's date: 4/1/2022    Suzi Miller is a 36 year old female who presents for a preoperative evaluation.    Surgical Information:  Surgery/Procedure: total laparoscopic hysterectomy   Surgery Location: [facility]  Surgeon: Josse  Surgery Date: 4/14/2022  Time of Surgery: TBD  Where patient plans to recover: At home with family  Fax number for surgical facility: Note does not need to be faxed, will be available electronically in Epic.    Type of Anesthesia Anticipated: General    Assessment & Plan     The proposed surgical procedure is considered INTERMEDIATE risk.    1. Preop general physical exam    2. Endometriosis    3. Restrictive airway disease    4. Hashimoto's disease      Preoperative COVID testing ordered to be completed 4/11/2022. Labs completed 03/21/2022 and were unremarkable. EKG not indicated due to patient's age and health status.    Refilled oxycodone short term to cover through surgery.  reviewed and appears appropriate. Refilled albuterol inhaler.       Risks and Recommendations:  The patient has the following additional risks and recommendations for perioperative complications:   - No identified additional risk factors other than previously addressed    Medication Instructions:  Patient is to take all scheduled medications on the day of surgery    RECOMMENDATION:  APPROVAL GIVEN to proceed with proposed procedure, without further diagnostic evaluation.    Obi Guevara PA-C on 4/1/2022 at 3:14 PM      Subjective     HPI related to upcoming procedure:   Patient evaluated by OB/GYN on 3/30/2022 for evaluation of heavy menses, dysmenorrhea.  Recent ultrasound in the ED showing hemorrhagic cyst versus endometrioma.  Does have a history suspicious for  endometriosis.  At that time OB/GYN and patient decided to proceed with TLH, BS, cystoscopy, possible left oophorectomy.      Preop Questions 4/1/2022   1. Have you ever had a heart attack or stroke? No   2. Have you ever had surgery on your heart or blood vessels, such as a stent placement, a coronary artery bypass, or surgery on an artery in your head, neck, heart, or legs? No   3. Do you have chest pain with activity? No   4. Do you have a history of  heart failure? No   5. Do you currently have a cold, bronchitis or symptoms of other infection? No   6. Do you have a cough, shortness of breath, or wheezing? No   7. Do you or anyone in your family have previous history of blood clots? UNKNOWN    8. Do you or does anyone in your family have a serious bleeding problem such as prolonged bleeding following surgeries or cuts? No   9. Have you ever had problems with anemia or been told to take iron pills? YES - took iron in the past, but not anymore    10. Have you had any abnormal blood loss such as black, tarry or bloody stools, or abnormal vaginal bleeding? YES - abnormal vaginal bleeding    11. Have you ever had a blood transfusion? No   12. Are you willing to have a blood transfusion if it is medically needed before, during, or after your surgery? Yes   13. Have you or any of your relatives ever had problems with anesthesia? Yes - difficulty waking from anesthia    14. Do you have sleep apnea, excessive snoring or daytime drowsiness? No   15. Do you have any artifical heart valves or other implanted medical devices like a pacemaker, defibrillator, or continuous glucose monitor? No   16. Do you have artificial joints? No   17. Are you allergic to latex? No   18. Is there any chance that you may be pregnant? No     Health Care Directive:  Patient does not have a Health Care Directive or Living Will: Discussed advance care planning with patient; however, patient declined at this time.    Preoperative Review of  :   reviewed - controlled substances reflected in medication list.      Status of Chronic Conditions:  All problems stable; see active problem list.       Review of Systems  CONSTITUTIONAL: NEGATIVE for fever, chills, change in weight  INTEGUMENTARY/SKIN: NEGATIVE for worrisome rashes, moles or lesions  EYES: NEGATIVE for vision changes or irritation  ENT/MOUTH: NEGATIVE for ear, mouth and throat problems  RESP: NEGATIVE for significant cough or SOB  CV: NEGATIVE for chest pain, palpitations or peripheral edema  GI: NEGATIVE for nausea, abdominal pain, heartburn, or change in bowel habits  : NEGATIVE for frequency, dysuria, or hematuria  MUSCULOSKELETAL: NEGATIVE for significant arthralgias or myalgia  NEURO: NEGATIVE for weakness, dizziness or paresthesias  ENDOCRINE: NEGATIVE for temperature intolerance, skin/hair changes  HEME: NEGATIVE for bleeding problems  PSYCHIATRIC: NEGATIVE for changes in mood or affect    Patient Active Problem List    Diagnosis Date Noted     Hashimoto's disease 02/28/2019     Priority: Medium     Restrictive airway disease 05/01/2007     Priority: Medium     Endometriosis 06/01/2002     Priority: Medium      Past Medical History:   Diagnosis Date     Endometriosis      Hashimoto's thyroiditis      Past Surgical History:   Procedure Laterality Date     C/SECTION, LOW TRANSVERSE       CLOSED RX BIG TOE FRACTURE  2009    hardware     EXCISE NODULE THYROID       LEEP TX, CERVICAL  2003     Current Outpatient Medications   Medication Sig Dispense Refill     Acetaminophen (TYLENOL ARTHRITIS EXT RELIEF OR) Take 2 tablets by mouth daily        albuterol (PROAIR HFA/PROVENTIL HFA/VENTOLIN HFA) 108 (90 Base) MCG/ACT inhaler Inhale 2 puffs into the lungs every 6 hours as needed for shortness of breath / dyspnea or wheezing 18 g 0     APAP-Pamabrom-Pyrilamine (PAMPRIN MULTI-SYMPTOM PO) Take by mouth At Bedtime       CVS MELATONIN GUMMIES PO        EPINEPHrine (ANY BX GENERIC EQUIV) 0.3  "MG/0.3ML injection 2-pack Inject 0.3 mLs (0.3 mg) into the muscle once as needed for anaphylaxis 1 each 0     ibuprofen (ADVIL/MOTRIN) 600 MG tablet Take 600 mg by mouth every 8 hours as needed for moderate pain       ondansetron (ZOFRAN-ODT) 4 MG ODT tab Take 4 mg by mouth every 8 hours as needed for nausea       oxyCODONE (ROXICODONE) 5 MG tablet Take 1 tablet (5 mg) by mouth every 6 hours as needed for severe pain 10 tablet 0     SUMAtriptan (IMITREX) 50 MG tablet Take 1 tablet (50 mg) by mouth at onset of headache for migraine May repeat in 2 hours. Max 4 tablets/24 hours. 90 tablet 3       Allergies   Allergen Reactions     Penicillins Anaphylaxis     Reglan [Metoclopramide]      seizure        Social History     Tobacco Use     Smoking status: Never Smoker     Smokeless tobacco: Never Used   Substance Use Topics     Alcohol use: Yes     Comment: rare     Family History   Problem Relation Age of Onset     Early menopause Mother 41     Heart Disease Father      Heart Disease Paternal Grandmother 62     Early menopause Paternal Grandmother      History   Drug Use Unknown         Objective     /76   Pulse 86   Temp 97.8  F (36.6  C)   Resp 14   Ht 1.626 m (5' 4\")   Wt 93.9 kg (207 lb)   LMP 03/27/2022   SpO2 98%   Breastfeeding No   BMI 35.53 kg/m      Physical Exam    GENERAL APPEARANCE: healthy, alert and no distress     EYES: EOMI, PERRL     HENT: ear canals and TM's normal and nose and mouth without ulcers or lesions     NECK: no adenopathy, no asymmetry, masses, or scars and thyroid normal to palpation     RESP: lungs clear to auscultation - no rales, rhonchi or wheezes     CV: regular rates and rhythm, normal S1 S2, no S3 or S4 and no murmur, click or rub     ABDOMEN:  soft, nontender, no HSM or masses and bowel sounds normal     PSYCH: mentation appears normal. and affect normal/bright     LYMPHATICS: No cervical adenopathy    Recent Labs   Lab Test 03/21/22  0006   HGB 12.7      NA " 136   POTASSIUM 3.6   CR 0.69        Diagnostics:  No labs were ordered during this visit. Completed 3/21/2022 and were unremarkable as noted above.   No EKG required, no history of coronary heart disease, significant arrhythmia, peripheral arterial disease or other structural heart disease.       Revised Cardiac Risk Index (RCRI):  The patient has the following serious cardiovascular risks for perioperative complications:   - No serious cardiac risks = 0 points     RCRI Interpretation: 0 points: Class I (very low risk - 0.4% complication rate)           Signed Electronically by: Vonda Goodson PA-C  Copy of this evaluation report is provided to requesting physician.

## 2022-03-31 NOTE — H&P (VIEW-ONLY)
[facility]  [address]  [place] [address]  Phone: [phone]  Fax: [phone]  Primary Provider: No Ref-Primary, Physician  Pre-op Performing Provider: OBI GUEVARA      PREOPERATIVE EVALUATION:  Today's date: 4/1/2022    Suzi Miller is a 36 year old female who presents for a preoperative evaluation.    Surgical Information:  Surgery/Procedure: total laparoscopic hysterectomy   Surgery Location: [facility]  Surgeon: Josse  Surgery Date: 4/14/2022  Time of Surgery: TBD  Where patient plans to recover: At home with family  Fax number for surgical facility: Note does not need to be faxed, will be available electronically in Epic.    Type of Anesthesia Anticipated: General    Assessment & Plan     The proposed surgical procedure is considered INTERMEDIATE risk.    1. Preop general physical exam    2. Endometriosis    3. Restrictive airway disease    4. Hashimoto's disease      Preoperative COVID testing ordered to be completed 4/11/2022. Labs completed 03/21/2022 and were unremarkable. EKG not indicated due to patient's age and health status.    Refilled oxycodone short term to cover through surgery.  reviewed and appears appropriate. Refilled albuterol inhaler.       Risks and Recommendations:  The patient has the following additional risks and recommendations for perioperative complications:   - No identified additional risk factors other than previously addressed    Medication Instructions:  Patient is to take all scheduled medications on the day of surgery    RECOMMENDATION:  APPROVAL GIVEN to proceed with proposed procedure, without further diagnostic evaluation.    Obi Guevara PA-C on 4/1/2022 at 3:14 PM      Subjective     HPI related to upcoming procedure:   Patient evaluated by OB/GYN on 3/30/2022 for evaluation of heavy menses, dysmenorrhea.  Recent ultrasound in the ED showing hemorrhagic cyst versus endometrioma.  Does have a history suspicious for  endometriosis.  At that time OB/GYN and patient decided to proceed with TLH, BS, cystoscopy, possible left oophorectomy.      Preop Questions 4/1/2022   1. Have you ever had a heart attack or stroke? No   2. Have you ever had surgery on your heart or blood vessels, such as a stent placement, a coronary artery bypass, or surgery on an artery in your head, neck, heart, or legs? No   3. Do you have chest pain with activity? No   4. Do you have a history of  heart failure? No   5. Do you currently have a cold, bronchitis or symptoms of other infection? No   6. Do you have a cough, shortness of breath, or wheezing? No   7. Do you or anyone in your family have previous history of blood clots? UNKNOWN    8. Do you or does anyone in your family have a serious bleeding problem such as prolonged bleeding following surgeries or cuts? No   9. Have you ever had problems with anemia or been told to take iron pills? YES - took iron in the past, but not anymore    10. Have you had any abnormal blood loss such as black, tarry or bloody stools, or abnormal vaginal bleeding? YES - abnormal vaginal bleeding    11. Have you ever had a blood transfusion? No   12. Are you willing to have a blood transfusion if it is medically needed before, during, or after your surgery? Yes   13. Have you or any of your relatives ever had problems with anesthesia? Yes - difficulty waking from anesthia    14. Do you have sleep apnea, excessive snoring or daytime drowsiness? No   15. Do you have any artifical heart valves or other implanted medical devices like a pacemaker, defibrillator, or continuous glucose monitor? No   16. Do you have artificial joints? No   17. Are you allergic to latex? No   18. Is there any chance that you may be pregnant? No     Health Care Directive:  Patient does not have a Health Care Directive or Living Will: Discussed advance care planning with patient; however, patient declined at this time.    Preoperative Review of  :   reviewed - controlled substances reflected in medication list.      Status of Chronic Conditions:  All problems stable; see active problem list.       Review of Systems  CONSTITUTIONAL: NEGATIVE for fever, chills, change in weight  INTEGUMENTARY/SKIN: NEGATIVE for worrisome rashes, moles or lesions  EYES: NEGATIVE for vision changes or irritation  ENT/MOUTH: NEGATIVE for ear, mouth and throat problems  RESP: NEGATIVE for significant cough or SOB  CV: NEGATIVE for chest pain, palpitations or peripheral edema  GI: NEGATIVE for nausea, abdominal pain, heartburn, or change in bowel habits  : NEGATIVE for frequency, dysuria, or hematuria  MUSCULOSKELETAL: NEGATIVE for significant arthralgias or myalgia  NEURO: NEGATIVE for weakness, dizziness or paresthesias  ENDOCRINE: NEGATIVE for temperature intolerance, skin/hair changes  HEME: NEGATIVE for bleeding problems  PSYCHIATRIC: NEGATIVE for changes in mood or affect    Patient Active Problem List    Diagnosis Date Noted     Hashimoto's disease 02/28/2019     Priority: Medium     Restrictive airway disease 05/01/2007     Priority: Medium     Endometriosis 06/01/2002     Priority: Medium      Past Medical History:   Diagnosis Date     Endometriosis      Hashimoto's thyroiditis      Past Surgical History:   Procedure Laterality Date     C/SECTION, LOW TRANSVERSE       CLOSED RX BIG TOE FRACTURE  2009    hardware     EXCISE NODULE THYROID       LEEP TX, CERVICAL  2003     Current Outpatient Medications   Medication Sig Dispense Refill     Acetaminophen (TYLENOL ARTHRITIS EXT RELIEF OR) Take 2 tablets by mouth daily        albuterol (PROAIR HFA/PROVENTIL HFA/VENTOLIN HFA) 108 (90 Base) MCG/ACT inhaler Inhale 2 puffs into the lungs every 6 hours as needed for shortness of breath / dyspnea or wheezing 18 g 0     APAP-Pamabrom-Pyrilamine (PAMPRIN MULTI-SYMPTOM PO) Take by mouth At Bedtime       CVS MELATONIN GUMMIES PO        EPINEPHrine (ANY BX GENERIC EQUIV) 0.3  "MG/0.3ML injection 2-pack Inject 0.3 mLs (0.3 mg) into the muscle once as needed for anaphylaxis 1 each 0     ibuprofen (ADVIL/MOTRIN) 600 MG tablet Take 600 mg by mouth every 8 hours as needed for moderate pain       ondansetron (ZOFRAN-ODT) 4 MG ODT tab Take 4 mg by mouth every 8 hours as needed for nausea       oxyCODONE (ROXICODONE) 5 MG tablet Take 1 tablet (5 mg) by mouth every 6 hours as needed for severe pain 10 tablet 0     SUMAtriptan (IMITREX) 50 MG tablet Take 1 tablet (50 mg) by mouth at onset of headache for migraine May repeat in 2 hours. Max 4 tablets/24 hours. 90 tablet 3       Allergies   Allergen Reactions     Penicillins Anaphylaxis     Reglan [Metoclopramide]      seizure        Social History     Tobacco Use     Smoking status: Never Smoker     Smokeless tobacco: Never Used   Substance Use Topics     Alcohol use: Yes     Comment: rare     Family History   Problem Relation Age of Onset     Early menopause Mother 41     Heart Disease Father      Heart Disease Paternal Grandmother 62     Early menopause Paternal Grandmother      History   Drug Use Unknown         Objective     /76   Pulse 86   Temp 97.8  F (36.6  C)   Resp 14   Ht 1.626 m (5' 4\")   Wt 93.9 kg (207 lb)   LMP 03/27/2022   SpO2 98%   Breastfeeding No   BMI 35.53 kg/m      Physical Exam    GENERAL APPEARANCE: healthy, alert and no distress     EYES: EOMI, PERRL     HENT: ear canals and TM's normal and nose and mouth without ulcers or lesions     NECK: no adenopathy, no asymmetry, masses, or scars and thyroid normal to palpation     RESP: lungs clear to auscultation - no rales, rhonchi or wheezes     CV: regular rates and rhythm, normal S1 S2, no S3 or S4 and no murmur, click or rub     ABDOMEN:  soft, nontender, no HSM or masses and bowel sounds normal     PSYCH: mentation appears normal. and affect normal/bright     LYMPHATICS: No cervical adenopathy    Recent Labs   Lab Test 03/21/22  0006   HGB 12.7      NA " 136   POTASSIUM 3.6   CR 0.69        Diagnostics:  No labs were ordered during this visit. Completed 3/21/2022 and were unremarkable as noted above.   No EKG required, no history of coronary heart disease, significant arrhythmia, peripheral arterial disease or other structural heart disease.       Revised Cardiac Risk Index (RCRI):  The patient has the following serious cardiovascular risks for perioperative complications:   - No serious cardiac risks = 0 points     RCRI Interpretation: 0 points: Class I (very low risk - 0.4% complication rate)           Signed Electronically by: Vonda Goodson PA-C  Copy of this evaluation report is provided to requesting physician.

## 2022-03-31 NOTE — PATIENT INSTRUCTIONS
Preparing for Your Surgery  Getting started  A nurse will call you to review your health history and instructions. They will give you an arrival time based on your scheduled surgery time. Please be ready to share:    Your doctor's clinic name and phone number    Your medical, surgical and anesthesia history    A list of allergies and sensitivities    A list of medicines, including herbal treatments and over-the-counter drugs    Whether the patient has a legal guardian (ask how to send us the papers in advance)  Please tell us if you're pregnant--or if there's any chance you might be pregnant. Some surgeries may injure a fetus (unborn baby), so they require a pregnancy test. Surgeries that are safe for a fetus don't always need a test, and you can choose whether to have one.   If you have a child who's having surgery, please ask for a copy of Preparing for Your Child's Surgery.    Preparing for surgery    Within 30 days of surgery: Have a pre-op exam (sometimes called an H&P, or History and Physical). This can be done at a clinic or pre-operative center.  ? If you're having a , you may not need this exam. Talk to your care team.    At your pre-op exam, talk to your care team about all medicines you take. If you need to stop any medicines before surgery, ask when to start taking them again.  ? We do this for your safety. Many medicines can make you bleed too much during surgery. Some change how well surgery (anesthesia) drugs work.    Call your insurance company to let them know you're having surgery. (If you don't have insurance, call [phone].)    Call your clinic if there's any change in your health. This includes signs of a cold or flu (sore throat, runny nose, cough, rash, fever). It also includes a scrape or scratch near the surgery site.    If you have questions on the day of surgery, call your hospital or surgery center.  COVID testing  You may need to be tested for COVID-19 before having  surgery. If so, your surgical team will give you instructions for scheduling this test, separate from your preoperative history and physical.  Eating and drinking guidelines  For your safety: Unless your surgeon tells you otherwise, follow the guidelines below.    Eat and drink as usual until 8 hours before surgery. After that, no food or milk.    Drink clear liquids until 2 hours before surgery. These are liquids you can see through, like water, Gatorade and Propel Water. You may also have black coffee and tea (no cream or milk).    Nothing by mouth within 2 hours of surgery. This includes gum, candy and breath mints.    If you drink alcohol: Stop drinking it the night before surgery.    If your care team tells you to take medicine on the morning of surgery, it's okay to take it with a sip of water.  Preventing infection    Shower or bathe the night before and morning of your surgery. Follow the instructions your clinic gave you. (If no instructions, use regular soap.)    Don't shave or clip hair near your surgery site. We'll remove the hair if needed.    Don't smoke or vape the morning of surgery. You may chew nicotine gum up to 2 hours before surgery. A nicotine patch is okay.  ? Note: Some surgeries require you to completely quit smoking and nicotine. Check with your surgeon.    Your care team will make every effort to keep you safe from infection. We will:  ? Clean our hands often with soap and water (or an alcohol-based hand rub).  ? Clean the skin at your surgery site with a special soap that kills germs.  ? Give you a special gown to keep you warm. (Cold raises the risk of infection.)  ? Wear special hair covers, masks, gowns and gloves during surgery.  ? Give antibiotic medicine, if prescribed. Not all surgeries need antibiotics.  What to bring on the day of surgery    Photo ID and insurance card    Copy of your health care directive, if you have one    Glasses and hearing aides (bring cases)  ? You can't  wear contacts during surgery    Inhaler and eye drops, if you use them (tell us about these when you arrive)    CPAP machine or breathing device, if you use them    A few personal items, if spending the night    If you have . . .  ? A pacemaker, ICD (cardiac defibrillator) or other implant: Bring the ID card.  ? An implanted stimulator: Bring the remote control.  ? A legal guardian: Bring a copy of the certified (court-stamped) guardianship papers.  Please remove any jewelry, including body piercings. Leave jewelry and other valuables at home.  If you're going home the day of surgery    You must have a responsible adult drive you home. They should stay with you overnight as well.    If you don't have someone to stay with you, and you aren't safe to go home alone, we may keep you overnight. Insurance often won't pay for this.  After surgery  If it's hard to control your pain or you need more pain medicine, please call your surgeon's office.  Questions?   If you have any questions for your care team, list them here: _________________________________________________________________________________________________________________________________________________________________________ ____________________________________ ____________________________________ ____________________________________  For informational purposes only. Not to replace the advice of your health care provider. Copyright   2003, 2019 [facility]. All rights reserved. Clinically reviewed by Mouna Bernstein MD. COINTERRA [id number] - REV 07/21.

## 2022-04-01 ENCOUNTER — OFFICE VISIT (OUTPATIENT)
Dept: FAMILY MEDICINE | Facility: OTHER | Age: 37
End: 2022-04-01
Attending: PHYSICIAN ASSISTANT
Payer: COMMERCIAL

## 2022-04-01 VITALS
HEIGHT: 64 IN | DIASTOLIC BLOOD PRESSURE: 76 MMHG | SYSTOLIC BLOOD PRESSURE: 114 MMHG | BODY MASS INDEX: 35.34 KG/M2 | HEART RATE: 86 BPM | OXYGEN SATURATION: 98 % | WEIGHT: 207 LBS | TEMPERATURE: 97.8 F | RESPIRATION RATE: 14 BRPM

## 2022-04-01 DIAGNOSIS — Z01.818 PREOP GENERAL PHYSICAL EXAM: Primary | ICD-10-CM

## 2022-04-01 DIAGNOSIS — J98.4 RESTRICTIVE AIRWAY DISEASE: ICD-10-CM

## 2022-04-01 DIAGNOSIS — N80.9 ENDOMETRIOSIS: ICD-10-CM

## 2022-04-01 DIAGNOSIS — E06.3 HASHIMOTO'S DISEASE: ICD-10-CM

## 2022-04-01 PROCEDURE — G0463 HOSPITAL OUTPT CLINIC VISIT: HCPCS

## 2022-04-01 PROCEDURE — 99214 OFFICE O/P EST MOD 30 MIN: CPT | Performed by: PHYSICIAN ASSISTANT

## 2022-04-01 RX ORDER — OXYCODONE HYDROCHLORIDE 5 MG/1
5 TABLET ORAL EVERY 6 HOURS PRN
Qty: 10 TABLET | Refills: 0 | Status: SHIPPED | OUTPATIENT
Start: 2022-04-01 | End: 2022-04-29

## 2022-04-01 RX ORDER — ALBUTEROL SULFATE 90 UG/1
2 AEROSOL, METERED RESPIRATORY (INHALATION) EVERY 6 HOURS PRN
Qty: 18 G | Refills: 0 | Status: SHIPPED | OUTPATIENT
Start: 2022-04-01

## 2022-04-01 ASSESSMENT — PAIN SCALES - GENERAL: PAINLEVEL: SEVERE PAIN (7)

## 2022-04-01 NOTE — NURSING NOTE
Patient presents to clinic for Pre-Op exam.  Tory Peres LPN ....................  4/1/2022   2:25 PM

## 2022-04-11 ENCOUNTER — ALLIED HEALTH/NURSE VISIT (OUTPATIENT)
Dept: FAMILY MEDICINE | Facility: OTHER | Age: 37
End: 2022-04-11
Attending: PHYSICIAN ASSISTANT
Payer: COMMERCIAL

## 2022-04-11 DIAGNOSIS — Z01.818 PREOP GENERAL PHYSICAL EXAM: ICD-10-CM

## 2022-04-11 PROCEDURE — U0005 INFEC AGEN DETEC AMPLI PROBE: HCPCS | Mod: ZL

## 2022-04-11 PROCEDURE — C9803 HOPD COVID-19 SPEC COLLECT: HCPCS

## 2022-04-11 NOTE — PROGRESS NOTES
Patient here today for Covid test.  Having a procedure on 4/14       Maryanne Ravi CNA .............................on 4/11/2022 at 11:36 AM

## 2022-04-12 LAB — SARS-COV-2 RNA RESP QL NAA+PROBE: NEGATIVE

## 2022-04-13 ENCOUNTER — ANESTHESIA EVENT (OUTPATIENT)
Dept: SURGERY | Facility: OTHER | Age: 37
End: 2022-04-13
Payer: COMMERCIAL

## 2022-04-13 DIAGNOSIS — T88.4XXA HARD TO INTUBATE, INITIAL ENCOUNTER: Primary | ICD-10-CM

## 2022-04-13 RX ORDER — FENTANYL CITRATE 50 UG/ML
50 INJECTION, SOLUTION INTRAMUSCULAR; INTRAVENOUS
Status: CANCELLED | OUTPATIENT
Start: 2022-04-13

## 2022-04-13 NOTE — PHARMACY
Pharmacy- Weight Dose Adjustment    Patient Active Problem List   Diagnosis     Endometriosis     Hashimoto's disease     Restrictive airway disease        Relevant Labs:  Recent Labs   Lab Test 03/21/22  0006 02/20/20  1125   WBC 9.3 7.9   HGB 12.7 12.8    329        No intake or output data in the 24 hours ending 04/13/22 1403       Per Periprocedural Antibiotic Dosing policy, will adjust:  -Gentamicin to 350 mg x1 dose (one-time pre-op dose adjusted based on patient's Adjusted Body Weight).     Per periprocedural antibiotic dosing policy, Gentamicin for surgical prophylaxis should be limited to a single dose given preoperatively. Dosing is based on the patient s total body weight (TBW). If the patient s total body weight is more than 20% above ideal body weight (IBW), the adjusted dosing weight (AdjBW) can be determined as follows: AdjBW = IBW + 0.4(TBW - IBW).     Per order note to pharmacy: Use 2mg/kg dosing if patient CrCl <30 mL/min. Use total body weight (TBW) if TBW is <1.2x ideal body weight (IBW); use adjusted body weight if TBW is > 1.2x IBW.      Will continue to follow and make adjustments accordingly. Thank You.    Garrett Mendenhall [facility] ....................  4/13/2022   2:03 PM

## 2022-04-14 ENCOUNTER — ANESTHESIA (OUTPATIENT)
Dept: SURGERY | Facility: OTHER | Age: 37
End: 2022-04-14
Payer: COMMERCIAL

## 2022-04-14 ENCOUNTER — HOSPITAL ENCOUNTER (OUTPATIENT)
Facility: OTHER | Age: 37
Discharge: HOME | End: 2022-04-15
Attending: OBSTETRICS & GYNECOLOGY | Admitting: OBSTETRICS & GYNECOLOGY
Payer: COMMERCIAL

## 2022-04-14 DIAGNOSIS — N94.6 DYSMENORRHEA: ICD-10-CM

## 2022-04-14 DIAGNOSIS — Z90.710 S/P LAPAROSCOPIC HYSTERECTOMY: Primary | ICD-10-CM

## 2022-04-14 PROBLEM — T88.4XXA HARD TO INTUBATE: Status: ACTIVE | Noted: 2022-04-14

## 2022-04-14 LAB
GLUCOSE BLDC GLUCOMTR-MCNC: 97 MG/DL (ref 70–99)
HCG UR QL: NEGATIVE

## 2022-04-14 PROCEDURE — 258N000001 HC RX 258: Performed by: OBSTETRICS & GYNECOLOGY

## 2022-04-14 PROCEDURE — 250N000011 HC RX IP 250 OP 636: Performed by: NURSE ANESTHETIST, CERTIFIED REGISTERED

## 2022-04-14 PROCEDURE — 81025 URINE PREGNANCY TEST: CPT | Performed by: OBSTETRICS & GYNECOLOGY

## 2022-04-14 PROCEDURE — 64488 TAP BLOCK BI INJECTION: CPT | Mod: XU | Performed by: NURSE ANESTHETIST, CERTIFIED REGISTERED

## 2022-04-14 PROCEDURE — 58571 TLH W/T/O 250 G OR LESS: CPT | Performed by: NURSE ANESTHETIST, CERTIFIED REGISTERED

## 2022-04-14 PROCEDURE — 96374 THER/PROPH/DIAG INJ IV PUSH: CPT | Mod: XU

## 2022-04-14 PROCEDURE — 82962 GLUCOSE BLOOD TEST: CPT

## 2022-04-14 PROCEDURE — 250N000013 HC RX MED GY IP 250 OP 250 PS 637: Performed by: OBSTETRICS & GYNECOLOGY

## 2022-04-14 PROCEDURE — 88307 TISSUE EXAM BY PATHOLOGIST: CPT

## 2022-04-14 PROCEDURE — 272N000001 HC OR GENERAL SUPPLY STERILE: Performed by: OBSTETRICS & GYNECOLOGY

## 2022-04-14 PROCEDURE — 258N000003 HC RX IP 258 OP 636: Performed by: OBSTETRICS & GYNECOLOGY

## 2022-04-14 PROCEDURE — 360N000077 HC SURGERY LEVEL 4, PER MIN: Performed by: OBSTETRICS & GYNECOLOGY

## 2022-04-14 PROCEDURE — 58571 TLH W/T/O 250 G OR LESS: CPT | Performed by: OBSTETRICS & GYNECOLOGY

## 2022-04-14 PROCEDURE — 710N000010 HC RECOVERY PHASE 1, LEVEL 2, PER MIN: Performed by: OBSTETRICS & GYNECOLOGY

## 2022-04-14 PROCEDURE — 999N000141 HC STATISTIC PRE-PROCEDURE NURSING ASSESSMENT: Performed by: OBSTETRICS & GYNECOLOGY

## 2022-04-14 PROCEDURE — 96375 TX/PRO/DX INJ NEW DRUG ADDON: CPT | Mod: XU

## 2022-04-14 PROCEDURE — 250N000009 HC RX 250: Performed by: NURSE ANESTHETIST, CERTIFIED REGISTERED

## 2022-04-14 PROCEDURE — 250N000011 HC RX IP 250 OP 636: Performed by: OBSTETRICS & GYNECOLOGY

## 2022-04-14 PROCEDURE — 258N000003 HC RX IP 258 OP 636: Performed by: NURSE ANESTHETIST, CERTIFIED REGISTERED

## 2022-04-14 PROCEDURE — 250N000009 HC RX 250: Performed by: OBSTETRICS & GYNECOLOGY

## 2022-04-14 PROCEDURE — 370N000017 HC ANESTHESIA TECHNICAL FEE, PER MIN: Performed by: OBSTETRICS & GYNECOLOGY

## 2022-04-14 RX ORDER — MEPERIDINE HYDROCHLORIDE 50 MG/ML
12.5 INJECTION INTRAMUSCULAR; INTRAVENOUS; SUBCUTANEOUS
Status: DISCONTINUED | OUTPATIENT
Start: 2022-04-14 | End: 2022-04-14

## 2022-04-14 RX ORDER — ONDANSETRON 4 MG/1
4 TABLET, ORALLY DISINTEGRATING ORAL EVERY 6 HOURS PRN
Status: DISCONTINUED | OUTPATIENT
Start: 2022-04-14 | End: 2022-04-15 | Stop reason: HOSPADM

## 2022-04-14 RX ORDER — ACETAMINOPHEN 325 MG/1
975 TABLET ORAL ONCE
Status: COMPLETED | OUTPATIENT
Start: 2022-04-14 | End: 2022-04-14

## 2022-04-14 RX ORDER — SODIUM CHLORIDE, SODIUM LACTATE, POTASSIUM CHLORIDE, CALCIUM CHLORIDE 600; 310; 30; 20 MG/100ML; MG/100ML; MG/100ML; MG/100ML
INJECTION, SOLUTION INTRAVENOUS CONTINUOUS
Status: DISCONTINUED | OUTPATIENT
Start: 2022-04-14 | End: 2022-04-14

## 2022-04-14 RX ORDER — KETOROLAC TROMETHAMINE 15 MG/ML
15 INJECTION, SOLUTION INTRAMUSCULAR; INTRAVENOUS EVERY 6 HOURS
Status: DISCONTINUED | OUTPATIENT
Start: 2022-04-14 | End: 2022-04-15 | Stop reason: HOSPADM

## 2022-04-14 RX ORDER — ONDANSETRON 2 MG/ML
4 INJECTION INTRAMUSCULAR; INTRAVENOUS EVERY 30 MIN PRN
Status: DISCONTINUED | OUTPATIENT
Start: 2022-04-14 | End: 2022-04-14

## 2022-04-14 RX ORDER — NALOXONE HYDROCHLORIDE 0.4 MG/ML
0.2 INJECTION, SOLUTION INTRAMUSCULAR; INTRAVENOUS; SUBCUTANEOUS
Status: DISCONTINUED | OUTPATIENT
Start: 2022-04-14 | End: 2022-04-15 | Stop reason: HOSPADM

## 2022-04-14 RX ORDER — OXYCODONE HYDROCHLORIDE 5 MG/1
5 TABLET ORAL EVERY 4 HOURS PRN
Status: DISCONTINUED | OUTPATIENT
Start: 2022-04-14 | End: 2022-04-15 | Stop reason: HOSPADM

## 2022-04-14 RX ORDER — HYDROMORPHONE HCL IN WATER/PF 6 MG/30 ML
0.4 PATIENT CONTROLLED ANALGESIA SYRINGE INTRAVENOUS
Status: DISCONTINUED | OUTPATIENT
Start: 2022-04-14 | End: 2022-04-15 | Stop reason: HOSPADM

## 2022-04-14 RX ORDER — HYDROMORPHONE HYDROCHLORIDE 1 MG/ML
0.4 INJECTION, SOLUTION INTRAMUSCULAR; INTRAVENOUS; SUBCUTANEOUS EVERY 5 MIN PRN
Status: DISCONTINUED | OUTPATIENT
Start: 2022-04-14 | End: 2022-04-14

## 2022-04-14 RX ORDER — NALOXONE HYDROCHLORIDE 0.4 MG/ML
0.2 INJECTION, SOLUTION INTRAMUSCULAR; INTRAVENOUS; SUBCUTANEOUS
Status: DISCONTINUED | OUTPATIENT
Start: 2022-04-14 | End: 2022-04-14

## 2022-04-14 RX ORDER — ACETAMINOPHEN 325 MG/1
975 TABLET ORAL EVERY 6 HOURS
Status: DISCONTINUED | OUTPATIENT
Start: 2022-04-14 | End: 2022-04-15 | Stop reason: HOSPADM

## 2022-04-14 RX ORDER — BISACODYL 10 MG
10 SUPPOSITORY, RECTAL RECTAL DAILY PRN
Status: DISCONTINUED | OUTPATIENT
Start: 2022-04-14 | End: 2022-04-15 | Stop reason: HOSPADM

## 2022-04-14 RX ORDER — OXYCODONE HYDROCHLORIDE 5 MG/1
10 TABLET ORAL EVERY 4 HOURS PRN
Status: DISCONTINUED | OUTPATIENT
Start: 2022-04-14 | End: 2022-04-15 | Stop reason: HOSPADM

## 2022-04-14 RX ORDER — BUPIVACAINE HYDROCHLORIDE 2.5 MG/ML
INJECTION, SOLUTION EPIDURAL; INFILTRATION; INTRACAUDAL PRN
Status: DISCONTINUED | OUTPATIENT
Start: 2022-04-14 | End: 2022-04-14

## 2022-04-14 RX ORDER — AMOXICILLIN 250 MG
1-2 CAPSULE ORAL 2 TIMES DAILY
Qty: 30 TABLET | Refills: 0 | Status: SHIPPED | OUTPATIENT
Start: 2022-04-14 | End: 2022-11-02

## 2022-04-14 RX ORDER — NALOXONE HYDROCHLORIDE 0.4 MG/ML
0.4 INJECTION, SOLUTION INTRAMUSCULAR; INTRAVENOUS; SUBCUTANEOUS
Status: DISCONTINUED | OUTPATIENT
Start: 2022-04-14 | End: 2022-04-15 | Stop reason: HOSPADM

## 2022-04-14 RX ORDER — LIDOCAINE 40 MG/G
CREAM TOPICAL
Status: DISCONTINUED | OUTPATIENT
Start: 2022-04-14 | End: 2022-04-14 | Stop reason: HOSPADM

## 2022-04-14 RX ORDER — KETOROLAC TROMETHAMINE 30 MG/ML
INJECTION, SOLUTION INTRAMUSCULAR; INTRAVENOUS PRN
Status: DISCONTINUED | OUTPATIENT
Start: 2022-04-14 | End: 2022-04-14

## 2022-04-14 RX ORDER — LIDOCAINE 40 MG/G
CREAM TOPICAL
Status: DISCONTINUED | OUTPATIENT
Start: 2022-04-14 | End: 2022-04-15 | Stop reason: HOSPADM

## 2022-04-14 RX ORDER — ONDANSETRON 2 MG/ML
INJECTION INTRAMUSCULAR; INTRAVENOUS PRN
Status: DISCONTINUED | OUTPATIENT
Start: 2022-04-14 | End: 2022-04-14

## 2022-04-14 RX ORDER — SODIUM CHLORIDE, SODIUM LACTATE, POTASSIUM CHLORIDE, CALCIUM CHLORIDE 600; 310; 30; 20 MG/100ML; MG/100ML; MG/100ML; MG/100ML
INJECTION, SOLUTION INTRAVENOUS CONTINUOUS
Status: DISCONTINUED | OUTPATIENT
Start: 2022-04-14 | End: 2022-04-15 | Stop reason: HOSPADM

## 2022-04-14 RX ORDER — LABETALOL HYDROCHLORIDE 5 MG/ML
10 INJECTION, SOLUTION INTRAVENOUS
Status: DISCONTINUED | OUTPATIENT
Start: 2022-04-14 | End: 2022-04-14

## 2022-04-14 RX ORDER — ONDANSETRON 4 MG/1
4 TABLET, ORALLY DISINTEGRATING ORAL EVERY 30 MIN PRN
Status: DISCONTINUED | OUTPATIENT
Start: 2022-04-14 | End: 2022-04-14

## 2022-04-14 RX ORDER — DEXMEDETOMIDINE HYDROCHLORIDE 4 UG/ML
INJECTION, SOLUTION INTRAVENOUS PRN
Status: DISCONTINUED | OUTPATIENT
Start: 2022-04-14 | End: 2022-04-14

## 2022-04-14 RX ORDER — OXYCODONE HYDROCHLORIDE 5 MG/1
5 TABLET ORAL EVERY 4 HOURS PRN
Status: DISCONTINUED | OUTPATIENT
Start: 2022-04-14 | End: 2022-04-14

## 2022-04-14 RX ORDER — NALOXONE HYDROCHLORIDE 0.4 MG/ML
0.4 INJECTION, SOLUTION INTRAMUSCULAR; INTRAVENOUS; SUBCUTANEOUS
Status: DISCONTINUED | OUTPATIENT
Start: 2022-04-14 | End: 2022-04-14

## 2022-04-14 RX ORDER — PROPOFOL 10 MG/ML
INJECTION, EMULSION INTRAVENOUS CONTINUOUS PRN
Status: DISCONTINUED | OUTPATIENT
Start: 2022-04-14 | End: 2022-04-14

## 2022-04-14 RX ORDER — OXYCODONE HYDROCHLORIDE 5 MG/1
5 TABLET ORAL EVERY 4 HOURS PRN
Qty: 20 TABLET | Refills: 0 | Status: SHIPPED | OUTPATIENT
Start: 2022-04-14 | End: 2022-04-29

## 2022-04-14 RX ORDER — CLINDAMYCIN PHOSPHATE 900 MG/50ML
900 INJECTION, SOLUTION INTRAVENOUS SEE ADMIN INSTRUCTIONS
Status: DISCONTINUED | OUTPATIENT
Start: 2022-04-14 | End: 2022-04-14 | Stop reason: HOSPADM

## 2022-04-14 RX ORDER — LIDOCAINE HYDROCHLORIDE 20 MG/ML
JELLY TOPICAL PRN
Status: DISCONTINUED | OUTPATIENT
Start: 2022-04-14 | End: 2022-04-14

## 2022-04-14 RX ORDER — ACETAMINOPHEN 325 MG/1
975 TABLET ORAL EVERY 6 HOURS PRN
Qty: 50 TABLET | Refills: 0 | Status: SHIPPED | OUTPATIENT
Start: 2022-04-14 | End: 2022-11-02

## 2022-04-14 RX ORDER — PHENAZOPYRIDINE HYDROCHLORIDE 100 MG/1
200 TABLET, FILM COATED ORAL ONCE
Status: COMPLETED | OUTPATIENT
Start: 2022-04-14 | End: 2022-04-14

## 2022-04-14 RX ORDER — ONDANSETRON 2 MG/ML
4 INJECTION INTRAMUSCULAR; INTRAVENOUS EVERY 6 HOURS PRN
Status: DISCONTINUED | OUTPATIENT
Start: 2022-04-14 | End: 2022-04-15 | Stop reason: HOSPADM

## 2022-04-14 RX ORDER — AMOXICILLIN 250 MG
1 CAPSULE ORAL 2 TIMES DAILY
Status: DISCONTINUED | OUTPATIENT
Start: 2022-04-14 | End: 2022-04-15 | Stop reason: HOSPADM

## 2022-04-14 RX ORDER — FENTANYL CITRATE 50 UG/ML
50 INJECTION, SOLUTION INTRAMUSCULAR; INTRAVENOUS EVERY 5 MIN PRN
Status: DISCONTINUED | OUTPATIENT
Start: 2022-04-14 | End: 2022-04-14

## 2022-04-14 RX ORDER — CLINDAMYCIN PHOSPHATE 900 MG/50ML
900 INJECTION, SOLUTION INTRAVENOUS
Status: COMPLETED | OUTPATIENT
Start: 2022-04-14 | End: 2022-04-14

## 2022-04-14 RX ORDER — IBUPROFEN 400 MG/1
800 TABLET, FILM COATED ORAL EVERY 6 HOURS
Status: DISCONTINUED | OUTPATIENT
Start: 2022-04-14 | End: 2022-04-15 | Stop reason: HOSPADM

## 2022-04-14 RX ORDER — PROCHLORPERAZINE MALEATE 10 MG
10 TABLET ORAL EVERY 6 HOURS PRN
Status: DISCONTINUED | OUTPATIENT
Start: 2022-04-14 | End: 2022-04-15 | Stop reason: HOSPADM

## 2022-04-14 RX ORDER — SODIUM CHLORIDE, SODIUM LACTATE, POTASSIUM CHLORIDE, CALCIUM CHLORIDE 600; 310; 30; 20 MG/100ML; MG/100ML; MG/100ML; MG/100ML
INJECTION, SOLUTION INTRAVENOUS CONTINUOUS
Status: DISCONTINUED | OUTPATIENT
Start: 2022-04-14 | End: 2022-04-14 | Stop reason: HOSPADM

## 2022-04-14 RX ORDER — HYDROMORPHONE HCL IN WATER/PF 6 MG/30 ML
0.2 PATIENT CONTROLLED ANALGESIA SYRINGE INTRAVENOUS
Status: DISCONTINUED | OUTPATIENT
Start: 2022-04-14 | End: 2022-04-15 | Stop reason: HOSPADM

## 2022-04-14 RX ORDER — ACETAMINOPHEN 325 MG/1
650 TABLET ORAL EVERY 6 HOURS
Status: DISCONTINUED | OUTPATIENT
Start: 2022-04-17 | End: 2022-04-15 | Stop reason: HOSPADM

## 2022-04-14 RX ORDER — PROPOFOL 10 MG/ML
INJECTION, EMULSION INTRAVENOUS PRN
Status: DISCONTINUED | OUTPATIENT
Start: 2022-04-14 | End: 2022-04-14

## 2022-04-14 RX ORDER — FENTANYL CITRATE 50 UG/ML
INJECTION, SOLUTION INTRAMUSCULAR; INTRAVENOUS PRN
Status: DISCONTINUED | OUTPATIENT
Start: 2022-04-14 | End: 2022-04-14

## 2022-04-14 RX ORDER — KETAMINE HYDROCHLORIDE 10 MG/ML
INJECTION INTRAMUSCULAR; INTRAVENOUS PRN
Status: DISCONTINUED | OUTPATIENT
Start: 2022-04-14 | End: 2022-04-14

## 2022-04-14 RX ORDER — IBUPROFEN 800 MG/1
800 TABLET, FILM COATED ORAL EVERY 6 HOURS PRN
Qty: 30 TABLET | Refills: 0 | Status: SHIPPED | OUTPATIENT
Start: 2022-04-14 | End: 2023-02-27

## 2022-04-14 RX ORDER — DEXAMETHASONE SODIUM PHOSPHATE 10 MG/ML
INJECTION, SOLUTION INTRAMUSCULAR; INTRAVENOUS PRN
Status: DISCONTINUED | OUTPATIENT
Start: 2022-04-14 | End: 2022-04-14

## 2022-04-14 RX ORDER — LIDOCAINE HYDROCHLORIDE 20 MG/ML
INJECTION, SOLUTION INFILTRATION; PERINEURAL PRN
Status: DISCONTINUED | OUTPATIENT
Start: 2022-04-14 | End: 2022-04-14

## 2022-04-14 RX ADMIN — PROPOFOL 200 MCG/KG/MIN: 10 INJECTION, EMULSION INTRAVENOUS at 09:53

## 2022-04-14 RX ADMIN — PROPOFOL 200 MG: 10 INJECTION, EMULSION INTRAVENOUS at 09:52

## 2022-04-14 RX ADMIN — BUPIVACAINE HYDROCHLORIDE 25 ML: 2.5 INJECTION, SOLUTION EPIDURAL; INFILTRATION; INTRACAUDAL; PERINEURAL at 10:08

## 2022-04-14 RX ADMIN — DEXMEDETOMIDINE HYDROCHLORIDE 16 MCG: 4 INJECTION, SOLUTION INTRAVENOUS at 10:08

## 2022-04-14 RX ADMIN — SODIUM CHLORIDE, POTASSIUM CHLORIDE, SODIUM LACTATE AND CALCIUM CHLORIDE 100 ML/HR: 600; 310; 30; 20 INJECTION, SOLUTION INTRAVENOUS at 09:03

## 2022-04-14 RX ADMIN — FENTANYL CITRATE 50 MCG: 50 INJECTION INTRAMUSCULAR; INTRAVENOUS at 11:57

## 2022-04-14 RX ADMIN — HYDROMORPHONE HYDROCHLORIDE 0.4 MG: 1 INJECTION, SOLUTION INTRAMUSCULAR; INTRAVENOUS; SUBCUTANEOUS at 12:24

## 2022-04-14 RX ADMIN — DEXAMETHASONE SODIUM PHOSPHATE 5 MG: 10 INJECTION, SOLUTION INTRAMUSCULAR; INTRAVENOUS at 10:08

## 2022-04-14 RX ADMIN — FENTANYL CITRATE 50 MCG: 50 INJECTION INTRAMUSCULAR; INTRAVENOUS at 12:13

## 2022-04-14 RX ADMIN — HYDROMORPHONE HYDROCHLORIDE 0.4 MG: 1 INJECTION, SOLUTION INTRAMUSCULAR; INTRAVENOUS; SUBCUTANEOUS at 12:33

## 2022-04-14 RX ADMIN — HYDROMORPHONE HYDROCHLORIDE 0.4 MG: 1 INJECTION, SOLUTION INTRAMUSCULAR; INTRAVENOUS; SUBCUTANEOUS at 12:55

## 2022-04-14 RX ADMIN — GENTAMICIN SULFATE 350 MG: 40 INJECTION, SOLUTION INTRAMUSCULAR; INTRAVENOUS at 09:34

## 2022-04-14 RX ADMIN — Medication 30 MG: at 10:20

## 2022-04-14 RX ADMIN — KETOROLAC TROMETHAMINE 15 MG: 15 INJECTION, SOLUTION INTRAMUSCULAR; INTRAVENOUS at 18:21

## 2022-04-14 RX ADMIN — ONDANSETRON 4 MG: 2 INJECTION INTRAMUSCULAR; INTRAVENOUS at 12:21

## 2022-04-14 RX ADMIN — MIDAZOLAM HYDROCHLORIDE 2 MG: 1 INJECTION, SOLUTION INTRAMUSCULAR; INTRAVENOUS at 09:46

## 2022-04-14 RX ADMIN — DEXMEDETOMIDINE HYDROCHLORIDE 8 MCG: 4 INJECTION, SOLUTION INTRAVENOUS at 11:10

## 2022-04-14 RX ADMIN — SODIUM CHLORIDE, POTASSIUM CHLORIDE, SODIUM LACTATE AND CALCIUM CHLORIDE: 600; 310; 30; 20 INJECTION, SOLUTION INTRAVENOUS at 13:15

## 2022-04-14 RX ADMIN — DEXMEDETOMIDINE HYDROCHLORIDE 16 MCG: 4 INJECTION, SOLUTION INTRAVENOUS at 10:04

## 2022-04-14 RX ADMIN — ACETAMINOPHEN 975 MG: 325 TABLET ORAL at 16:12

## 2022-04-14 RX ADMIN — BUPIVACAINE HYDROCHLORIDE 25 ML: 2.5 INJECTION, SOLUTION EPIDURAL; INFILTRATION; INTRACAUDAL; PERINEURAL at 10:04

## 2022-04-14 RX ADMIN — ACETAMINOPHEN 975 MG: 325 TABLET ORAL at 21:48

## 2022-04-14 RX ADMIN — ROCURONIUM BROMIDE 50 MG: 50 INJECTION, SOLUTION INTRAVENOUS at 09:52

## 2022-04-14 RX ADMIN — PROCHLORPERAZINE EDISYLATE 10 MG: 5 INJECTION INTRAMUSCULAR; INTRAVENOUS at 20:25

## 2022-04-14 RX ADMIN — SUGAMMADEX 200 MG: 100 INJECTION, SOLUTION INTRAVENOUS at 11:12

## 2022-04-14 RX ADMIN — KETOROLAC TROMETHAMINE 30 MG: 30 INJECTION, SOLUTION INTRAMUSCULAR at 11:26

## 2022-04-14 RX ADMIN — ONDANSETRON HYDROCHLORIDE 4 MG: 2 SOLUTION INTRAMUSCULAR; INTRAVENOUS at 09:46

## 2022-04-14 RX ADMIN — DEXAMETHASONE SODIUM PHOSPHATE 5 MG: 10 INJECTION, SOLUTION INTRAMUSCULAR; INTRAVENOUS at 10:04

## 2022-04-14 RX ADMIN — PHENAZOPYRIDINE 200 MG: 100 TABLET ORAL at 08:45

## 2022-04-14 RX ADMIN — FENTANYL CITRATE 50 MCG: 50 INJECTION, SOLUTION INTRAMUSCULAR; INTRAVENOUS at 09:52

## 2022-04-14 RX ADMIN — OXYCODONE HYDROCHLORIDE 5 MG: 5 TABLET ORAL at 16:12

## 2022-04-14 RX ADMIN — Medication 10 MG: at 10:49

## 2022-04-14 RX ADMIN — LIDOCAINE HYDROCHLORIDE 70 MG: 20 INJECTION, SOLUTION INFILTRATION; PERINEURAL at 09:52

## 2022-04-14 RX ADMIN — ONDANSETRON 4 MG: 2 INJECTION INTRAMUSCULAR; INTRAVENOUS at 16:19

## 2022-04-14 RX ADMIN — ACETAMINOPHEN 975 MG: 325 TABLET ORAL at 08:45

## 2022-04-14 RX ADMIN — DEXMEDETOMIDINE HYDROCHLORIDE 8 MCG: 4 INJECTION, SOLUTION INTRAVENOUS at 10:24

## 2022-04-14 RX ADMIN — FENTANYL CITRATE 50 MCG: 50 INJECTION INTRAMUSCULAR; INTRAVENOUS at 12:02

## 2022-04-14 RX ADMIN — LIDOCAINE HYDROCHLORIDE 0.25 TUBE: 20 JELLY TOPICAL at 09:55

## 2022-04-14 RX ADMIN — CLINDAMYCIN PHOSPHATE 900 MG: 900 INJECTION, SOLUTION INTRAVENOUS at 09:04

## 2022-04-14 RX ADMIN — HYDROMORPHONE HYDROCHLORIDE 0.4 MG: 1 INJECTION, SOLUTION INTRAMUSCULAR; INTRAVENOUS; SUBCUTANEOUS at 12:43

## 2022-04-14 NOTE — ANESTHESIA PREPROCEDURE EVALUATION
Anesthesia Pre-Procedure Evaluation    Patient: Suzi Miller   MRN: [id number] : 1985        Procedure : Procedure(s):  total laparoscopic hysterectomy, bilateral salpingectomy, cystoscopy, possible left oophorectomy          Past Medical History:   Diagnosis Date     Endometriosis      Hashimoto's thyroiditis       Past Surgical History:   Procedure Laterality Date     C/SECTION, LOW TRANSVERSE       CLOSED RX BIG TOE FRACTURE  2009    hardware     EXCISE NODULE THYROID       LEEP TX, CERVICAL  2003      Allergies   Allergen Reactions     Penicillins Anaphylaxis     Reglan [Metoclopramide]      seizure      Social History     Tobacco Use     Smoking status: Never Smoker     Smokeless tobacco: Never Used   Substance Use Topics     Alcohol use: Yes     Comment: rare      Wt Readings from Last 1 Encounters:   22 93.9 kg (207 lb)        Anesthesia Evaluation   Pt has had prior anesthetic.     No history of anesthetic complications       ROS/MED HX  ENT/Pulmonary:     (+) Intermittent, asthma Treatment: Inhaler prn,      Neurologic:  - neg neurologic ROS     Cardiovascular:  - neg cardiovascular ROS   (+) -----SHAH.     METS/Exercise Tolerance: >4 METS    Hematologic:  - neg hematologic  ROS     Musculoskeletal:  - neg musculoskeletal ROS     GI/Hepatic:  - neg GI/hepatic ROS     Renal/Genitourinary:  - neg Renal ROS     Endo:     (+) thyroid problem,     Psychiatric/Substance Use:  - neg psychiatric ROS     Infectious Disease:  - neg infectious disease ROS     Malignancy:  - neg malignancy ROS     Other:  - neg other ROS          Physical Exam    Airway        Mallampati: II   TM distance: > 3 FB   Neck ROM: full   Mouth opening: > 3 cm    Respiratory Devices and Support         Dental  no notable dental history         Cardiovascular   cardiovascular exam normal       Rhythm and rate: regular and normal     Pulmonary   pulmonary exam normal        breath sounds clear to auscultation           OUTSIDE  LABS:  CBC:   Lab Results   Component Value Date    WBC 9.3 03/21/2022    WBC 7.9 02/20/2020    HGB 12.7 03/21/2022    HGB 12.8 02/20/2020    HCT 38.4 03/21/2022    HCT 39.1 02/20/2020     03/21/2022     02/20/2020     BMP:   Lab Results   Component Value Date     03/21/2022    POTASSIUM 3.6 03/21/2022    CHLORIDE 102 03/21/2022    CO2 27 03/21/2022    BUN 13 03/21/2022    CR 0.69 03/21/2022     (H) 03/21/2022     COAGS: No results found for: PTT, INR, FIBR  POC:   Lab Results   Component Value Date    HCG Negative 04/14/2022    HCGS Negative 03/21/2022     HEPATIC:   Lab Results   Component Value Date    ALBUMIN 4.5 03/21/2022    PROTTOTAL 7.1 03/21/2022    ALT 21 03/21/2022    AST 15 03/21/2022    ALKPHOS 43 03/21/2022    BILITOTAL 0.7 03/21/2022     OTHER:   Lab Results   Component Value Date    VERONIQUE 9.4 03/21/2022    LIPASE 26 03/21/2022    T4 0.86 06/01/2021    CRP 12.1 (H) 03/21/2022       Anesthesia Plan    ASA Status:  2   NPO Status:  NPO Appropriate    Anesthesia Type: General.     - Airway: ETT   Induction: Propofol.   Maintenance: Balanced.        Consents    Anesthesia Plan(s) and associated risks, benefits, and realistic alternatives discussed. Questions answered and patient/representative(s) expressed understanding.     - Discussed: Risks, Benefits and Alternatives for BOTH SEDATION and the PROCEDURE were discussed     - Discussed with:  Patient      - Extended Intubation/Ventilatory Support Discussed: No.      - Patient is DNR/DNI Status: No    Use of blood products discussed: Yes.     - Discussed with: Patient.     - Consented: consented to blood products            Reason for refusal: other.     Postoperative Care    Pain management: IV analgesics, Peripheral nerve block (Single Shot), Multi-modal analgesia.   PONV prophylaxis: Ondansetron (or other 5HT-3), Dexamethasone or Solumedrol     Comments:                ETHEL QUINONES CRNA

## 2022-04-14 NOTE — OR NURSING
PACU Transfer Note    Suzi Miller was transferred to [facility] via bed.  Equipment used for transport:  none.  Accompanied by:  Beatrice TAVARES  Prescriptions were: n/a    PACU Respiratory Event Documentation     1) Episodes of Apnea greater than or equal to 10 seconds: 0    2) Bradypnea - less than 8 breaths per minute: 0    3) Pain score on 0 to 10 scale: 6    4) Pain-sedation mismatch (yes or no): yes    5) Repeated 02 desaturation less than 90% (yes or no): no    Anesthesia notified? (yes or no): n/a    Any of the above events occuring repeatedly in separate 30 minute intervals may be considered recurrent PACU respiratory events.    Patient stable and meets phase 1 discharge criteria for transport from PACU.    Report to Domitila TAVARES

## 2022-04-14 NOTE — ANESTHESIA PROCEDURE NOTES
Airway       Patient location during procedure: OR       Procedure Start/Stop Times: 4/14/2022 9:55 AM and 4/14/2022 9:55 AM  Staff -        CRNA: Flynn Lowery APRN CRNA       Performed By: CRNAIndications and Patient Condition       Indications for airway management: quentin-procedural       Induction type:intravenous       Mask difficulty assessment: 0 - not attempted    Final Airway Details       Final airway type: endotracheal airway       Successful airway: ETT - single  Endotracheal Airway Details        ETT size (mm): 7.0       Cuffed: yes       Successful intubation technique: direct laryngoscopy       DL Blade Type: Mancia 2       Grade View of Cords: 3       Adjucts: stylet       Position: Center       Measured from: gums/teeth       Secured at (cm): 22       Bite block used: None    Post intubation assessment        Placement verified by: capnometry        Number of attempts at approach: 1       Secured with: silk tape       Ease of procedure: difficult       Dentition: Intact    Medication(s) Administered   Medication Administration Time: 4/14/2022 9:55 AM    Additional Comments       Grade III view with Mancia II blade.  Consider video laryngoscopy for future intubation.

## 2022-04-14 NOTE — PROGRESS NOTES
"NS ADMISSION NOTE    Patient admitted to room 411 at approximately 1325 via cart from surgery. Patient was accompanied by nurse.     Verbal SBAR report received from ANUSHA Barron prior to patient arrival.     Patient alert and oriented X 4. Pain is controlled with current analgesics.  Medication(s) being used: fentanyl and dilaudid.  . Admission vital signs: Blood pressure 110/63, pulse 78, temperature 97.8  F (36.6  C), temperature source Tympanic, resp. rate 14, height 1.626 m (5' 4\"), weight 93.9 kg (207 lb), last menstrual period 03/27/2022, SpO2 93 %, not currently breastfeeding. Patient and  were oriented to plan of care, call light, bed controls, tv, telephone, bathroom and visiting hours.     Risk Assessment    The following safety risks were identified during admission: fall.     Skin Initial Assessment    This writer admitted this patient and completed a full skin assessment and Tee score in the Adult PCS flowsheet. Appropriate interventions initiated as needed.     Tee Risk Assessment  Sensory Perception: 4-->no impairment  Moisture: 4-->rarely moist  Activity: 3-->walks occasionally  Mobility: 4-->no limitation  Nutrition: 3-->adequate  Friction and Shear: 3-->no apparent problem  Tee Score: 21    Domitila Quan RN    "

## 2022-04-14 NOTE — ANESTHESIA PROCEDURE NOTES
TAP Procedure Note    Pre-Procedure   Staff -        CRNA: Flynn Lowery APRN CRNA       Performed By: CRNA       Procedure Start/Stop Times: 4/14/2022 10:00 AM and 4/14/2022 10:08 AM       Pre-Anesthestic Checklist: patient identified, IV checked, site marked, risks and benefits discussed, informed consent and monitors and equipment checked  Timeout:       Correct Patient: Yes        Correct Procedure: Yes        Correct Site: Yes        Correct Position: Yes        Correct Laterality: N/A        Site Marked: Yes  Procedure Documentation  Procedure: TAP       Diagnosis: POST-OP PAIN CONTROL       Laterality: bilateral       Patient Position: supine       Patient Prep/Sterile Barriers: sterile gloves, mask       Skin prep: Chloraprep       Needle Type: insulated       Needle Gauge: 20.        Needle Length (Inches): 6        Ultrasound guided       1. Ultrasound was used to identify targeted nerve, plexus, vascular marker, or fascial plane and place a needle adjacent to it in real-time.       2. Ultrasound was used to visualize the spread of anesthetic in close proximity to the above referenced structure.       3. A permanent image is entered into the patient's record.       4. The visualized anatomic structures appeared normal.       5. There were no apparent abnormal pathologic findings.    Assessment/Narrative         The placement was negative for: blood aspirated and painful injection       Paresthesias: No.       Bolus given via needle. no blood aspirated via catheter.        Secured via.        Insertion/Infusion Method: Single Shot       Complications: none    Medication(s) Administered   Medication Administration Time: 4/14/2022 10:00 AM

## 2022-04-14 NOTE — PLAN OF CARE
VSS, afebrile, pain controlled with scheduled and PRN pain medications.  Alert and oriented.  Off and on nausea but no vomiting.  Pt has tolerated some crackers, apple juice, and water.  Has voided 300 mL clear, orange urine.  Bleeding scant to light.  Puncture sites are approximated, clean, and dry.  On 1 L of O2 per pt request as she states it helps with her nausea.  The use of peppermint oil is also helping.  Will continue to monitor  Domitila Quan RN............................ 4/14/2022 5:36 PM

## 2022-04-14 NOTE — ANESTHESIA POSTPROCEDURE EVALUATION
Patient: Suzi Miller    Procedure: Procedure(s):  total laparoscopic hysterectomy, bilateral salpingectomy, cystoscopy, left oophorectomy       Anesthesia Type:  General    Note:  Disposition: Outpatient   Postop Pain Control: Uneventful            Sign Out: Well controlled pain   PONV: No   Neuro/Psych: Uneventful            Sign Out: Acceptable/Baseline neuro status   Airway/Respiratory: Uneventful            Sign Out: Acceptable/Baseline resp. status   CV/Hemodynamics: Uneventful            Sign Out: Acceptable CV status   Other NRE: NONE   DID A NON-ROUTINE EVENT OCCUR? No           Last vitals:  Vitals Value Taken Time   /55 04/14/22 1310   Temp 97.3  F (36.3  C) 04/14/22 1215   Pulse 71 04/14/22 1313   Resp 7 04/14/22 1313   SpO2 93 % 04/14/22 1313   Vitals shown include unvalidated device data.    Electronically Signed By: ETHEL Torrez CRNA  April 14, 2022  3:01 PM

## 2022-04-14 NOTE — ANESTHESIA CARE TRANSFER NOTE
Patient: Suzi Miller    Procedure: Procedure(s):  total laparoscopic hysterectomy, bilateral salpingectomy, cystoscopy, left oophorectomy       Diagnosis: Dysmenorrhea [N94.6]  Diagnosis Additional Information: No value filed.    Anesthesia Type:   General     Note:    Oropharynx: oral airway in place  Level of Consciousness: drowsy  Oxygen Supplementation: face mask  Level of Supplemental Oxygen (L/min / FiO2): 8  Independent Airway: airway patency satisfactory and stable  Dentition: dentition unchanged  Vital Signs Stable: post-procedure vital signs reviewed and stable  Report to RN Given: handoff report given  Patient transferred to: PACU    Handoff Report: Identifed the Patient, Identified the Reponsible Provider, Reviewed the pertinent medical history, Discussed the surgical course, Reviewed Intra-OP anesthesia mangement and issues during anesthesia, Set expectations for post-procedure period and Allowed opportunity for questions and acknowledgement of understanding      Vitals:  Vitals Value Taken Time   BP     Temp     Pulse     Resp     SpO2 94 % 04/14/22 1133   Vitals shown include unvalidated device data.    Electronically Signed By: ETHEL WEAVER CRNA  April 14, 2022  11:34 AM

## 2022-04-14 NOTE — INTERVAL H&P NOTE
"I have reviewed the surgical (or preoperative) H&P that is linked to this encounter, and examined the patient. There are no significant changes    Manuela Loaiza MD FACOG  OB/GYN  4/14/2022 9:00 AM      Clinical Conditions Present on Arrival:  Clinically Significant Risk Factors Present on Admission                   # Obesity: Estimated body mass index is 35.53 kg/m  as calculated from the following:    Height as of this encounter: 1.626 m (5' 4\").    Weight as of this encounter: 93.9 kg (207 lb).       "

## 2022-04-14 NOTE — OP NOTE
Gynecologic Operative Note   Suzi Miller  [id number]  4/14/2022    Preoperative Diagnosis:   Dysmenorrhea  Heavy menses     Postoperative Diagnosis:   Same s/p below procedure     Procedure:   1. Total laparoscopic hysterectomy with bilateral salpingectomy, left oophorectomy  2. Cystoscopy    Surgeon: Manuela Loaiza MD      Assist: Orly Cabrera MD    Anesthesia: general endotracheal   Specimens: uterus, cervix, bilateral fallopian tubes, left ovary  Complications: none apparent     EBL: 100 mL     Findings: Exam under anesthesia revealed mobile, anteverted uterus.  Upon entry of the abdomen with the laparoscope, no evidence of injury.  A survey of the upper abdomen showed normal anatomy. Moderate bladder flap adhesions. Left ovary adherent to the pelvic sidewall. Fallopian tubes with s/p tubal ligation changes, otherwise normal. Normal uterus    Indications: Ms. Miller is a 36 year old female who presented with complaint of dysmenorrhea and heavy menses.  A total laparoscopic hysterectomy with bilateral salpingectomy was recommended at that time. She also requested left oophorectomy due to persistent LLQ pain. All risks, benefits and alternatives were discussed and written informed consent was obtained.     Procedure: The patient was taken to the operating room where general anesthesia was induced without difficulty. She was then examined under anesthesia with the above noted findings. She was then prepared and draped in the normal sterile fashion in the dorsal lithotomy position using yellow fin stirrups. Attention was turned to the vagina. A bivalve speculum was placed for visualization of the cervix and the anterior lip of the cervix was then grasped with a single-tooth tenaculum. The cervix was then serially dilated and the V-Care uterine manipulator was placed through the cervix.     Attention was then turned to the abdomen where a 5mm infra-umbilical skin incision was made after infiltration with local  anesthetic. The veres needle was then introduced into the peritoneal cavity while tenting the abdominal wall. The gas was turned on and pneumoperitoneum was achieved using CO2 gas, opening pressure less than 5mmHg. The trocar and sleeve were then advanced without difficulty into the abdomen where intra-abdominal placement was confirmed with the laparoscope. A second 5mm skin incision was then made in the LLQ after infiltration with local anesthetic and the trocar and sleeve advanced under direct visualization. A third skin incision, 11 mm, was made in the LLQ after infiltration with local anesthetic and the trocar and sleeve advanced under direct visualization. A foruth skin incision, 5 mm, was made in the RLQ after infiltration with local anesthetic and the trocar and sleeve advanced under direct visualization.     A survey of the patient's abdomen and pelvis was made with the above noted findings. Attention was then turned to the pelvis. Bilateral ureters were identified. The right fallopian tube was elevated and the mesosalpinx divided with cautery toward the cornua. The fallopian tube segment was excised and removed from the abdomen, sent for pathology. The right round ligament was then grasped and transected using the Ligasure. The right uteroovarian ligament was then cauterized and divided with the Ligasure device.  The posterior leaf of the broad ligament was then incised toward the uterine vessels. The anterior leaf of the broad ligament was then incised along the bladder reflection to the midline. The left IP ligament was then cauterized and divided with the Ligasure device. The left ovary was dissected off the peritoneum with care to avoid the ureter. The left round ligament was then cauterized and divided with the Ligasure device.  From this side, the anterior leaf of the broad ligament was incised along the bladder reflection to the midline. A bladder flap was mobilized and the peritoneum on the  vesicouterine fold was incised to mobilize the bladder. The uterine arteries were then transected and ligated using the Ligasure device, down to the level of the colpotomy ring. The vagina was transected along the V-care cup using the monopolar cautery, resulting in separation of the uterus and attached left tube and ovary. The specimen was then delivered through the vagina, and the pneumo-occluder balloon was reinserted to maintain pneumoperitoneum. The vaginal vault was closed with running 0-Vicryl VLock using the endostitch device. The abdomen was irrigated, and excellent hemostasis was noted. A final look at the surgical sites showed excellent hemostasis and the 11mm port was closed using the Lalit Henrik. Then, the abdomen was the desufflated and all trocars were then removed. The skin incisions were then closed using 3-0 monocryl in a subcuticular fashion.     CYSTO: A cystoscopy was performed that showed bilateral ureteral jets, confirming ureteral patency. No evidence of bladder injury or foreign material.    All instruments were removed from the vagina and the kline catheter was replaced.     The patient tolerated the procedure well. Sponge, lap and needle counts were correct. The patient was taken to the recovery area in stable condition.    First Assist: Dr Cabrera was requested to be present to assist with hemostasis and visualization     Manuela Loaiza MD 11:36 AM 4/14/2022

## 2022-04-15 VITALS
OXYGEN SATURATION: 98 % | HEART RATE: 98 BPM | HEIGHT: 64 IN | WEIGHT: 207 LBS | BODY MASS INDEX: 35.34 KG/M2 | DIASTOLIC BLOOD PRESSURE: 71 MMHG | SYSTOLIC BLOOD PRESSURE: 121 MMHG | RESPIRATION RATE: 18 BRPM | TEMPERATURE: 99 F

## 2022-04-15 LAB — HGB BLD-MCNC: 12.3 G/DL (ref 11.7–15.7)

## 2022-04-15 PROCEDURE — 250N000011 HC RX IP 250 OP 636: Performed by: OBSTETRICS & GYNECOLOGY

## 2022-04-15 PROCEDURE — 250N000013 HC RX MED GY IP 250 OP 250 PS 637: Performed by: OBSTETRICS & GYNECOLOGY

## 2022-04-15 PROCEDURE — 85018 HEMOGLOBIN: CPT | Performed by: OBSTETRICS & GYNECOLOGY

## 2022-04-15 PROCEDURE — 96376 TX/PRO/DX INJ SAME DRUG ADON: CPT

## 2022-04-15 PROCEDURE — 36415 COLL VENOUS BLD VENIPUNCTURE: CPT | Performed by: OBSTETRICS & GYNECOLOGY

## 2022-04-15 RX ADMIN — SENNOSIDES AND DOCUSATE SODIUM 1 TABLET: 50; 8.6 TABLET ORAL at 10:19

## 2022-04-15 RX ADMIN — IBUPROFEN 800 MG: 400 TABLET ORAL at 06:00

## 2022-04-15 RX ADMIN — ACETAMINOPHEN 975 MG: 325 TABLET ORAL at 10:19

## 2022-04-15 RX ADMIN — KETOROLAC TROMETHAMINE 15 MG: 15 INJECTION, SOLUTION INTRAMUSCULAR; INTRAVENOUS at 00:46

## 2022-04-15 RX ADMIN — ACETAMINOPHEN 975 MG: 325 TABLET ORAL at 04:07

## 2022-04-15 NOTE — PROGRESS NOTES
NSG DISCHARGE NOTE    Patient discharged to home at 10:40 AM via wheel chair. Accompanied by spouse and staff. Discharge instructions reviewed with patient, opportunity offered to ask questions. Prescriptions sent to patients preferred pharmacy. All belongings sent with patient.    Gwendolyn Smith RN

## 2022-04-15 NOTE — PROGRESS NOTES
Gyn Progress Note    S: Patient reports feeling much better this morning. Tolerating regular diet, although not much appetite. Pain better controlled. Voiding without difficulty. Wanting to go home today    O:   Vitals:    04/14/22 2239 04/15/22 0047 04/15/22 0403 04/15/22 0737   BP:  112/81 114/72 121/71   BP Location:  Right arm Right arm    Patient Position:  Semi-Guerra's Semi-Guerra's    Pulse:  110 114 98   Resp:  20 18 18   Temp:  99.2  F (37.3  C) 98.3  F (36.8  C) 99  F (37.2  C)   TempSrc:  Oral Oral Oral   SpO2: 94% 95% 96% 98%   Weight:       Height:         Gen: resting in bed, NAD  Abd: soft, nondistended, nontender. Incisions c/d/i, minimal ecchymosis  Ext: nontender, no edema    Hemoglobin   Date Value Ref Range Status   04/15/2022 12.3 11.7 - 15.7 g/dL Final   02/20/2020 12.8 11.7 - 15.7 g/dL Final     A/P: 36 year old POD#1 s/p TLH, BS, LO, cystoscopy  Doing well, okay for discharge    Manuela Loaiza MD FACOG  OB/GYN  4/15/2022 8:30 AM

## 2022-04-15 NOTE — DISCHARGE SUMMARY
[facility] Discharge Summary    Suzi Miller MRN# [id number]   Age: 36 year old YOB: 1985     Date of Admission:  4/14/2022  Date of Discharge::  4/15/2022   Admitting Physician:  Manuela Loaiza MD   Discharge Physician:  Manuela Loaiza MD           Admission Diagnoses:     Dysmenorrhea  Heavy menses          Discharge Diagnosis:     Same s/p below procedure          Procedures:   Procedure(s):  total laparoscopic hysterectomy, bilateral salpingectomy, left oophorectomy, cystoscopy            Medications Prior to Admission:     Medications Prior to Admission   Medication Sig Dispense Refill Last Dose     Acetaminophen (TYLENOL ARTHRITIS EXT RELIEF OR) Take 2 tablets by mouth daily    4/14/2022 at n     albuterol (PROAIR HFA/PROVENTIL HFA/VENTOLIN HFA) 108 (90 Base) MCG/ACT inhaler Inhale 2 puffs into the lungs every 6 hours as needed for shortness of breath / dyspnea or wheezing 18 g 0 More than a month at Unknown time     APAP-Pamabrom-Pyrilamine (PAMPRIN MULTI-SYMPTOM PO) Take by mouth At Bedtime   4/13/2022 at 2355     [facility] MELATONIN GUMMIES PO Take 1 tablet by mouth At Bedtime    4/13/2022 at 2355     EPINEPHrine (ANY BX GENERIC EQUIV) 0.3 MG/0.3ML injection 2-pack Inject 0.3 mLs (0.3 mg) into the muscle once as needed for anaphylaxis 1 each 0 More than a month at Unknown time     ibuprofen (ADVIL/MOTRIN) 600 MG tablet Take 600 mg by mouth every 8 hours as needed for moderate pain   Past Month at Unknown time     ondansetron (ZOFRAN-ODT) 4 MG ODT tab Take 4 mg by mouth every 8 hours as needed for nausea   Past Month at Unknown time     oxyCODONE (ROXICODONE) 5 MG tablet Take 1 tablet (5 mg) by mouth every 6 hours as needed for severe pain 10 tablet 0 4/13/2022 at 2355     SUMAtriptan (IMITREX) 50 MG tablet Take 1 tablet (50 mg) by mouth at onset of headache for migraine May repeat in 2 hours. Max 4 tablets/24 hours. 90 tablet 3 Past Month at Unknown time             Discharge  Medications:     Current Discharge Medication List      START taking these medications    Details   acetaminophen (TYLENOL) 325 MG tablet Take 3 tablets (975 mg) by mouth every 6 hours as needed for mild pain  Qty: 50 tablet, Refills: 0    Associated Diagnoses: S/P laparoscopic hysterectomy      !! ibuprofen (ADVIL/MOTRIN) 800 MG tablet Take 1 tablet (800 mg) by mouth every 6 hours as needed for other (mild and/or inflammatory pain)  Qty: 30 tablet, Refills: 0    Associated Diagnoses: S/P laparoscopic hysterectomy      !! oxyCODONE (ROXICODONE) 5 MG tablet Take 1 tablet (5 mg) by mouth every 4 hours as needed for moderate to severe pain  Qty: 20 tablet, Refills: 0    Associated Diagnoses: S/P laparoscopic hysterectomy      senna-docusate (SENOKOT-S/PERICOLACE) 8.6-50 MG tablet Take 1-2 tablets by mouth 2 times daily  Qty: 30 tablet, Refills: 0    Associated Diagnoses: S/P laparoscopic hysterectomy       !! - Potential duplicate medications found. Please discuss with provider.      CONTINUE these medications which have NOT CHANGED    Details   Acetaminophen (TYLENOL ARTHRITIS EXT RELIEF OR) Take 2 tablets by mouth daily       albuterol (PROAIR HFA/PROVENTIL HFA/VENTOLIN HFA) 108 (90 Base) MCG/ACT inhaler Inhale 2 puffs into the lungs every 6 hours as needed for shortness of breath / dyspnea or wheezing  Qty: 18 g, Refills: 0    Comments: Pharmacy may dispense brand covered by insurance (Proair, or proventil or ventolin or generic albuterol inhaler)  Associated Diagnoses: Restrictive airway disease      APAP-Pamabrom-Pyrilamine (PAMPRIN MULTI-SYMPTOM PO) Take by mouth At Bedtime      [employer] MELATONIN GUMMIES PO Take 1 tablet by mouth At Bedtime       EPINEPHrine (ANY BX GENERIC EQUIV) 0.3 MG/0.3ML injection 2-pack Inject 0.3 mLs (0.3 mg) into the muscle once as needed for anaphylaxis  Qty: 1 each, Refills: 0      !! ibuprofen (ADVIL/MOTRIN) 600 MG tablet Take 600 mg by mouth every 8 hours as needed for moderate pain       ondansetron (ZOFRAN-ODT) 4 MG ODT tab Take 4 mg by mouth every 8 hours as needed for nausea      !! oxyCODONE (ROXICODONE) 5 MG tablet Take 1 tablet (5 mg) by mouth every 6 hours as needed for severe pain  Qty: 10 tablet, Refills: 0    Associated Diagnoses: Endometriosis      SUMAtriptan (IMITREX) 50 MG tablet Take 1 tablet (50 mg) by mouth at onset of headache for migraine May repeat in 2 hours. Max 4 tablets/24 hours.  Qty: 90 tablet, Refills: 3    Associated Diagnoses: Migraine without status migrainosus, not intractable, unspecified migraine type       !! - Potential duplicate medications found. Please discuss with provider.                Consultations:   No consultations were requested during this admission          Brief History of Illness:     Ms. Miller is a 36 year old female who presented with complaint of dysmenorrhea and heavy menses.  A total laparoscopic hysterectomy with bilateral salpingectomy was recommended at that time. She also requested left oophorectomy due to persistent LLQ pain. All risks, benefits and alternatives were discussed and written informed consent was obtained. On 4/14/22, she underwent an uncomplicated procedure and was admitted for postoperative recovery.           Hospital Course:     The patient's hospital course was unremarkable. She initially had poor pain control that improved overnight. She recovered as anticipated and experienced no post-operative complications. On day of discharge, she was tolerating a regular diet, ambulating without difficulty, voiding spontaneously, and pain was well controlled on oral medications.  She was deemed stable for discharge on POD#1.          Discharge Instructions and Follow-Up:   Discharge diet: Regular   Discharge activity: Lifting restricted to 15 pounds  No heavy lifting, pushing, pulling for 6 week(s)  Pelvic rest: abstain from intercourse and do not use tampons for 6 week(s)   Discharge follow-up: Follow up with Dr ANAY Loaiza in 2 and  6 weeks   Wound care Drink plenty of fluids  Ice to area for comfort  Keep wound clean and dry           Discharge Disposition:   Discharged to home       Manuela Loaiza MD

## 2022-04-15 NOTE — PLAN OF CARE
Pt states she is feeling much better this morning, rates abdominal incision pain 3/10. Incisions are well approximated, no drainage noted. Pt denies nausea, states she has been voiding well. Vital signs stable, pt oxygen saturation 98% on room air. Pt ambulating independently in room with steady gait. Would like to discharge.   Gwendolyn Smith RN on 4/15/2022 at 8:10 AM

## 2022-04-15 NOTE — PROGRESS NOTES
VSS and recorded. Pain controlled with scheduled Toradol and Tylenol. Patient had intermittent nausea and one emesis, compazine given via IV, this relieved nausea. Tolerated bites of food and crackers after nausea. Lap sites are covered with liquid bandage and appear to be well approximated, no drainage noted. Patient ambulated to bathroom, some initial unsteadiness with standing but patient denies dizziness. Voiding without difficulty, urine is clear and orange in color. Scant amount of serosanguineous vaginal discharge. O2 saturations 95-96% on room air while awake, but did report on one occurence O2 alarms waking her and observed 88% on monitor after this occurrence. Patient had no increase work of breathing or distress noted. Did wear 2LPM via nasal cannula while sleeping to promote comfort for part of the night.

## 2022-04-18 LAB
PATH REPORT.COMMENTS IMP SPEC: NORMAL
PATH REPORT.FINAL DX SPEC: NORMAL
PATH REPORT.RELEVANT HX SPEC: NORMAL
PHOTO IMAGE: NORMAL

## 2022-04-29 ENCOUNTER — OFFICE VISIT (OUTPATIENT)
Dept: OBGYN | Facility: OTHER | Age: 37
End: 2022-04-29
Attending: OBSTETRICS & GYNECOLOGY
Payer: COMMERCIAL

## 2022-04-29 VITALS
WEIGHT: 207 LBS | BODY MASS INDEX: 35.53 KG/M2 | DIASTOLIC BLOOD PRESSURE: 60 MMHG | SYSTOLIC BLOOD PRESSURE: 112 MMHG | HEART RATE: 72 BPM

## 2022-04-29 DIAGNOSIS — R39.89 BLADDER PAIN: ICD-10-CM

## 2022-04-29 DIAGNOSIS — Z90.710 S/P LAPAROSCOPIC HYSTERECTOMY: Primary | ICD-10-CM

## 2022-04-29 LAB
ALBUMIN UR-MCNC: 20 MG/DL
APPEARANCE UR: CLEAR
BILIRUB UR QL STRIP: NEGATIVE
COLOR UR AUTO: ABNORMAL
GLUCOSE UR STRIP-MCNC: NEGATIVE MG/DL
HGB UR QL STRIP: NEGATIVE
KETONES UR STRIP-MCNC: NEGATIVE MG/DL
LEUKOCYTE ESTERASE UR QL STRIP: NEGATIVE
MUCOUS THREADS #/AREA URNS LPF: PRESENT /LPF
NITRATE UR QL: NEGATIVE
PH UR STRIP: 5.5 [PH] (ref 5–9)
RBC URINE: 1 /HPF
SP GR UR STRIP: 1.03 (ref 1–1.03)
SQUAMOUS EPITHELIAL: 3 /HPF
UROBILINOGEN UR STRIP-MCNC: NORMAL MG/DL
WBC URINE: 3 /HPF

## 2022-04-29 PROCEDURE — 81001 URINALYSIS AUTO W/SCOPE: CPT | Mod: ZL | Performed by: OBSTETRICS & GYNECOLOGY

## 2022-04-29 PROCEDURE — 99024 POSTOP FOLLOW-UP VISIT: CPT | Performed by: OBSTETRICS & GYNECOLOGY

## 2022-04-29 ASSESSMENT — PAIN SCALES - GENERAL: PAINLEVEL: MODERATE PAIN (4)

## 2022-04-29 NOTE — NURSING NOTE
Chief Complaint   Patient presents with     Surgical Followup     2 week post op        Medication Reconciliation: complete         Mady Luciano LPN........................4/29/2022  10:16 AM

## 2022-04-29 NOTE — PROGRESS NOTES
Postoperative Visit  2022    S: Suzi Miller is a 36 year old  here for post-operative visit following TLH, BS, LO, cysto on 22. She reports feeling okay. Still having left sided pain, sometimes similar to prior to surgery but sometimes different. Has been having loose stools since surgery that sometimes have mucous, is not taking a stool softener. Tolerating PO. Bladder still uncomfortable- voiding more frequently but only small amounts. Her bladder hurts when she voids but no external burning. Energy has been okay    O:   /60 (BP Location: Right arm, Patient Position: Sitting, Cuff Size: Adult Large)   Pulse 72   Wt 93.9 kg (207 lb)   LMP 2022   Breastfeeding No   BMI 35.53 kg/m    Gen:  Well-appearing, NAD  Abd: soft, nondistended, nontender.  Psych: appropriate mood and affect    A/P:   Ms. Suzi Miller is a 36 year old  two weeks s/p TLH, BS, LO, cysto. Doing well, no concerns. Reviewed path report consistent with adenomyosis, small area of endometriosis. Reviewed continued postop restrictions. Will get UA for her bladder pain.  RTC 4 weeks for postop check    Manuela Loaiza MD  OB/GYN  2022 10:29 AM

## 2022-05-16 ENCOUNTER — HOSPITAL ENCOUNTER (EMERGENCY)
Facility: OTHER | Age: 37
Discharge: HOME | End: 2022-05-16
Attending: EMERGENCY MEDICINE | Admitting: EMERGENCY MEDICINE
Payer: COMMERCIAL

## 2022-05-16 ENCOUNTER — APPOINTMENT (OUTPATIENT)
Dept: CT IMAGING | Facility: OTHER | Age: 37
End: 2022-05-16
Attending: EMERGENCY MEDICINE
Payer: COMMERCIAL

## 2022-05-16 VITALS
WEIGHT: 207 LBS | HEIGHT: 64 IN | DIASTOLIC BLOOD PRESSURE: 88 MMHG | HEART RATE: 111 BPM | BODY MASS INDEX: 35.34 KG/M2 | RESPIRATION RATE: 12 BRPM | OXYGEN SATURATION: 96 % | SYSTOLIC BLOOD PRESSURE: 127 MMHG | TEMPERATURE: 100.3 F

## 2022-05-16 DIAGNOSIS — U07.1 INFECTION DUE TO 2019 NOVEL CORONAVIRUS: ICD-10-CM

## 2022-05-16 LAB
ALBUMIN SERPL-MCNC: 4.6 G/DL (ref 3.5–5.7)
ALP SERPL-CCNC: 50 U/L (ref 34–104)
ALT SERPL W P-5'-P-CCNC: 21 U/L (ref 7–52)
ANION GAP SERPL CALCULATED.3IONS-SCNC: 12 MMOL/L (ref 3–14)
AST SERPL W P-5'-P-CCNC: 18 U/L (ref 13–39)
BASOPHILS # BLD AUTO: 0 10E3/UL (ref 0–0.2)
BASOPHILS NFR BLD AUTO: 0 %
BILIRUB SERPL-MCNC: 0.4 MG/DL (ref 0.3–1)
BUN SERPL-MCNC: 14 MG/DL (ref 7–25)
CALCIUM SERPL-MCNC: 9.4 MG/DL (ref 8.6–10.3)
CHLORIDE BLD-SCNC: 104 MMOL/L (ref 98–107)
CO2 SERPL-SCNC: 22 MMOL/L (ref 21–31)
CREAT SERPL-MCNC: 0.92 MG/DL (ref 0.6–1.2)
D DIMER PPP FEU-MCNC: 0.66 UG/ML FEU (ref 0–0.5)
EOSINOPHIL # BLD AUTO: 0.1 10E3/UL (ref 0–0.7)
EOSINOPHIL NFR BLD AUTO: 1 %
ERYTHROCYTE [DISTWIDTH] IN BLOOD BY AUTOMATED COUNT: 12 % (ref 10–15)
GFR SERPL CREATININE-BSD FRML MDRD: 83 ML/MIN/1.73M2
GLUCOSE BLD-MCNC: 105 MG/DL (ref 70–105)
HCT VFR BLD AUTO: 38.7 % (ref 35–47)
HGB BLD-MCNC: 12.7 G/DL (ref 11.7–15.7)
IMM GRANULOCYTES # BLD: 0 10E3/UL
IMM GRANULOCYTES NFR BLD: 1 %
LACTATE SERPL-SCNC: 1.1 MMOL/L (ref 0.7–2)
LYMPHOCYTES # BLD AUTO: 0.4 10E3/UL (ref 0.8–5.3)
LYMPHOCYTES NFR BLD AUTO: 8 %
MCH RBC QN AUTO: 30.5 PG (ref 26.5–33)
MCHC RBC AUTO-ENTMCNC: 32.8 G/DL (ref 31.5–36.5)
MCV RBC AUTO: 93 FL (ref 78–100)
MONOCYTES # BLD AUTO: 0.6 10E3/UL (ref 0–1.3)
MONOCYTES NFR BLD AUTO: 13 %
NEUTROPHILS # BLD AUTO: 4 10E3/UL (ref 1.6–8.3)
NEUTROPHILS NFR BLD AUTO: 77 %
NRBC # BLD AUTO: 0 10E3/UL
NRBC BLD AUTO-RTO: 0 /100
PLATELET # BLD AUTO: 290 10E3/UL (ref 150–450)
POTASSIUM BLD-SCNC: 3.8 MMOL/L (ref 3.5–5.1)
PROT SERPL-MCNC: 7.7 G/DL (ref 6.4–8.9)
RBC # BLD AUTO: 4.16 10E6/UL (ref 3.8–5.2)
SODIUM SERPL-SCNC: 138 MMOL/L (ref 134–144)
TSH SERPL DL<=0.005 MIU/L-ACNC: 0.88 MU/L (ref 0.4–4)
WBC # BLD AUTO: 5.1 10E3/UL (ref 4–11)

## 2022-05-16 PROCEDURE — 96374 THER/PROPH/DIAG INJ IV PUSH: CPT | Mod: XU | Performed by: EMERGENCY MEDICINE

## 2022-05-16 PROCEDURE — 80053 COMPREHEN METABOLIC PANEL: CPT | Performed by: EMERGENCY MEDICINE

## 2022-05-16 PROCEDURE — 99284 EMERGENCY DEPT VISIT MOD MDM: CPT | Performed by: EMERGENCY MEDICINE

## 2022-05-16 PROCEDURE — 93010 ELECTROCARDIOGRAM REPORT: CPT | Performed by: INTERNAL MEDICINE

## 2022-05-16 PROCEDURE — 250N000011 HC RX IP 250 OP 636: Performed by: EMERGENCY MEDICINE

## 2022-05-16 PROCEDURE — 99285 EMERGENCY DEPT VISIT HI MDM: CPT | Mod: 25 | Performed by: EMERGENCY MEDICINE

## 2022-05-16 PROCEDURE — 85025 COMPLETE CBC W/AUTO DIFF WBC: CPT | Performed by: EMERGENCY MEDICINE

## 2022-05-16 PROCEDURE — 71275 CT ANGIOGRAPHY CHEST: CPT | Mod: TC

## 2022-05-16 PROCEDURE — 83605 ASSAY OF LACTIC ACID: CPT | Performed by: EMERGENCY MEDICINE

## 2022-05-16 PROCEDURE — 96375 TX/PRO/DX INJ NEW DRUG ADDON: CPT | Performed by: EMERGENCY MEDICINE

## 2022-05-16 PROCEDURE — 84443 ASSAY THYROID STIM HORMONE: CPT | Performed by: EMERGENCY MEDICINE

## 2022-05-16 PROCEDURE — 93005 ELECTROCARDIOGRAM TRACING: CPT | Performed by: EMERGENCY MEDICINE

## 2022-05-16 PROCEDURE — 258N000003 HC RX IP 258 OP 636: Performed by: EMERGENCY MEDICINE

## 2022-05-16 PROCEDURE — 36415 COLL VENOUS BLD VENIPUNCTURE: CPT | Performed by: EMERGENCY MEDICINE

## 2022-05-16 PROCEDURE — 85379 FIBRIN DEGRADATION QUANT: CPT | Performed by: EMERGENCY MEDICINE

## 2022-05-16 RX ORDER — ONDANSETRON 2 MG/ML
4 INJECTION INTRAMUSCULAR; INTRAVENOUS ONCE
Status: COMPLETED | OUTPATIENT
Start: 2022-05-16 | End: 2022-05-16

## 2022-05-16 RX ORDER — SODIUM CHLORIDE 9 MG/ML
INJECTION, SOLUTION INTRAVENOUS CONTINUOUS
Status: DISCONTINUED | OUTPATIENT
Start: 2022-05-16 | End: 2022-05-17 | Stop reason: HOSPADM

## 2022-05-16 RX ORDER — IOPAMIDOL 755 MG/ML
86 INJECTION, SOLUTION INTRAVASCULAR ONCE
Status: COMPLETED | OUTPATIENT
Start: 2022-05-16 | End: 2022-05-16

## 2022-05-16 RX ORDER — KETOROLAC TROMETHAMINE 15 MG/ML
15 INJECTION, SOLUTION INTRAMUSCULAR; INTRAVENOUS ONCE
Status: COMPLETED | OUTPATIENT
Start: 2022-05-16 | End: 2022-05-16

## 2022-05-16 RX ADMIN — SODIUM CHLORIDE: 9 INJECTION, SOLUTION INTRAVENOUS at 20:48

## 2022-05-16 RX ADMIN — IOPAMIDOL 86 ML: 755 INJECTION, SOLUTION INTRAVENOUS at 21:28

## 2022-05-16 RX ADMIN — KETOROLAC TROMETHAMINE 15 MG: 15 INJECTION, SOLUTION INTRAMUSCULAR; INTRAVENOUS at 20:48

## 2022-05-16 RX ADMIN — ONDANSETRON 4 MG: 2 INJECTION INTRAMUSCULAR; INTRAVENOUS at 20:48

## 2022-05-17 NOTE — ED TRIAGE NOTES
"Pt here via private car.  Pt took a home covid test today at 1700 at she was positive.  She then took another one after that and that one was positive as well.  Pt states she started having chest pain at 1600 today and \"waxed and waned\" through tout this time.  Pt states the pin is on the left side and her lower lungs.  Pt rates the pain a 6/10.  Pt reports coughing and moving makes the pain worse.     Triage Assessment     Row Name 05/16/22 2012       Triage Assessment (Adult)    Airway WDL WDL       Respiratory WDL    Respiratory WDL WDL       Skin Circulation/Temperature WDL    Skin Circulation/Temperature WDL WDL       Peripheral/Neurovascular WDL    Peripheral Neurovascular WDL WDL              "

## 2022-05-17 NOTE — ED PROVIDER NOTES
[facility]  Emergency Department Visit Note    Chest Pain and Covid Concern      History of Present Illness     HPI:  Suzi Miller is a 36 year old female presenting with cough,fever and chest pain. These symptoms started 1 days ago and have been progressively worsening. The patient has not been exposed to sick contacts with similar symptoms. She had a positive COVID test today. She is able to tolerate oral intake including plenty of fluids. There is no purulent sputum and there is chest pain. Fever is present. Shortness of breath is not present. No abdominal pain, nausea, vomiting, rash. She has tried acetaminophen without significant improvement in symptoms. No known history of immunosuppression.    Medications:  Previous Medications    ACETAMINOPHEN (TYLENOL ARTHRITIS EXT RELIEF OR)    Take 2 tablets by mouth daily     ACETAMINOPHEN (TYLENOL) 325 MG TABLET    Take 3 tablets (975 mg) by mouth every 6 hours as needed for mild pain    ALBUTEROL (PROAIR HFA/PROVENTIL HFA/VENTOLIN HFA) 108 (90 BASE) MCG/ACT INHALER    Inhale 2 puffs into the lungs every 6 hours as needed for shortness of breath / dyspnea or wheezing    APAP-PAMABROM-PYRILAMINE (PAMPRIN MULTI-SYMPTOM PO)    Take by mouth At Bedtime    [employer] MELATONIN GUMMIES PO    Take 1 tablet by mouth At Bedtime     EPINEPHRINE (ANY BX GENERIC EQUIV) 0.3 MG/0.3ML INJECTION 2-PACK    Inject 0.3 mLs (0.3 mg) into the muscle once as needed for anaphylaxis    IBUPROFEN (ADVIL/MOTRIN) 600 MG TABLET    Take 600 mg by mouth every 8 hours as needed for moderate pain    IBUPROFEN (ADVIL/MOTRIN) 800 MG TABLET    Take 1 tablet (800 mg) by mouth every 6 hours as needed for other (mild and/or inflammatory pain)    ONDANSETRON (ZOFRAN-ODT) 4 MG ODT TAB    Take 4 mg by mouth every 8 hours as needed for nausea    SENNA-DOCUSATE (SENOKOT-S/PERICOLACE) 8.6-50 MG TABLET    Take 1-2 tablets by mouth 2 times daily    SUMATRIPTAN (IMITREX) 50 MG TABLET    Take 1  "tablet (50 mg) by mouth at onset of headache for migraine May repeat in 2 hours. Max 4 tablets/24 hours.       Allergies:  Allergies   Allergen Reactions     Penicillins Anaphylaxis     Reglan [Metoclopramide]      seizure       Problem List:  Patient Active Problem List   Diagnosis     Endometriosis     Hashimoto's disease     Restrictive airway disease     Hard to intubate     Dysmenorrhea       Past Medical History:  Past Medical History:   Diagnosis Date     Endometriosis      Hashimoto's thyroiditis        Past Surgical History:  Past Surgical History:   Procedure Laterality Date     C/SECTION, LOW TRANSVERSE       CLOSED RX BIG TOE FRACTURE  2009    hardware     EXCISE NODULE THYROID       LAPAROSCOPIC HYSTERECTOMY TOTAL, SALPINGECTOMY Bilateral 4/14/2022    Procedure: total laparoscopic hysterectomy, bilateral salpingectomy, cystoscopy, left oophorectomy;  Surgeon: Manuela Loaiza MD;  Location:  OR     [facility], CERVICAL  2003       Social History:  Social History     Tobacco Use     Smoking status: Never Smoker     Smokeless tobacco: Never Used   Vaping Use     Vaping Use: Never used   Substance Use Topics     Alcohol use: Yes     Comment: rare     Drug use: Never       Review of Systems:  Complete review of systems obtained and pertinent positive and negative findings noted in HPI. Review of systems otherwise negative.      Physical Exam     Vital signs: BP (!) 138/94   Pulse (!) 128   Temp 100.3  F (37.9  C) (Tympanic)   Resp 25   Ht 1.626 m (5' 4\")   Wt 93.9 kg (207 lb)   LMP 03/27/2022   SpO2 92%   BMI 35.53 kg/m      Physical Exam:    General: awake and alert, coughing intermittently, comfortable  HEENT: atraumatic, no scleral injection, no nasal discharge, neck supple  Chest: clear to auscultation bilaterally without wheezes or crackles, non labored respirations, symmetric chest rise  Cardiovascular: regular rate and rhythm, no murmurs or gallops  Abdomen: soft, nontender, no rebound or " guarding, nondistended  Extremities: no deformities, edema, or tenderness  Skin: warm, dry, no rashes  Neuro: alert and oriented x 3, moving extremities x 4, ambulates without difficulty      Medical Decision Making & ED Course     Differential includes Complications of COVID, specifically, pneumonia,PE. Given the patient's age, clinical appearance, duration of symptoms, and co-morbidities, a CTA was indicated and did not show PE or infiltrates. Will treat with Paxlivid, pulse ox s and close follow up with primary care provider to resolution. Return to the ED with increasing fever, trouble swallowing, difficulty breathing, or any other concerns. All questions were answered and the patient is comfortable with plan for discharge. The patient was discharged in stable condition.    ED Course as of 05/16/22 2343   Mon May 16, 2022   2054 D-Dimer Quantitative(!): 0.66   2249 IMPRESSION:   1. No pulmonary embolism.   2. No aortic dissection.   3. No cause for acute pain is identified.      .    Diagnosis & Disposition     Diagnosis:  1. Infection due to 2019 novel coronavirus        Disposition:  Home    MD Heri Estes Theresa M, MD  05/16/22 2343       Shannan Liriano MD  05/16/22 7611

## 2022-05-17 NOTE — ED NOTES
1.  Has the patient had a previous reaction to IV contrast? no    2.  Does the patient have kidney disease? no    3.  Is the patient on dialysis? no    If YES to any of these questions, exam will be reviewed with a Radiologist before administering contrast.      IV Contrast- Discharge Instructions After Your CT Scan      The IV contrast you received today will be filtered from your bloodstream by your kidneys during the next 24 hours and pass from the body in urine.  You will not be aware of this process and your urine will not change in color.  To help this process you should drink at least 4 additional glasses of water or juice today.  This reduces stress on your kidneys.    Most contrast reactions are immediate.  Should you develop symptoms of concern after discharge, contact the department at the number below.  After hours you should contact your personal physician.  If you develop breathing distress or wheezing, call 911.

## 2022-05-21 ENCOUNTER — HOSPITAL ENCOUNTER (EMERGENCY)
Facility: OTHER | Age: 37
Discharge: HOME | End: 2022-05-21
Attending: PHYSICIAN ASSISTANT | Admitting: PHYSICIAN ASSISTANT
Payer: COMMERCIAL

## 2022-05-21 ENCOUNTER — APPOINTMENT (OUTPATIENT)
Dept: GENERAL RADIOLOGY | Facility: OTHER | Age: 37
End: 2022-05-21
Attending: PHYSICIAN ASSISTANT
Payer: COMMERCIAL

## 2022-05-21 VITALS
RESPIRATION RATE: 18 BRPM | HEIGHT: 64 IN | HEART RATE: 91 BPM | OXYGEN SATURATION: 100 % | DIASTOLIC BLOOD PRESSURE: 91 MMHG | SYSTOLIC BLOOD PRESSURE: 147 MMHG | TEMPERATURE: 98.3 F | BODY MASS INDEX: 35 KG/M2 | WEIGHT: 205 LBS

## 2022-05-21 DIAGNOSIS — Z86.16 HISTORY OF COVID-19: ICD-10-CM

## 2022-05-21 DIAGNOSIS — R11.2 NON-INTRACTABLE VOMITING WITH NAUSEA, UNSPECIFIED VOMITING TYPE: ICD-10-CM

## 2022-05-21 LAB
ALBUMIN SERPL-MCNC: 4.1 G/DL (ref 3.5–5.7)
ALBUMIN UR-MCNC: NEGATIVE MG/DL
ALP SERPL-CCNC: 41 U/L (ref 34–104)
ALT SERPL W P-5'-P-CCNC: 21 U/L (ref 7–52)
ANION GAP SERPL CALCULATED.3IONS-SCNC: 10 MMOL/L (ref 3–14)
APPEARANCE UR: CLEAR
AST SERPL W P-5'-P-CCNC: 15 U/L (ref 13–39)
BACTERIA #/AREA URNS HPF: ABNORMAL /HPF
BASOPHILS # BLD AUTO: 0 10E3/UL (ref 0–0.2)
BASOPHILS NFR BLD AUTO: 0 %
BILIRUB SERPL-MCNC: 0.3 MG/DL (ref 0.3–1)
BILIRUB UR QL STRIP: NEGATIVE
BUN SERPL-MCNC: 10 MG/DL (ref 7–25)
CALCIUM SERPL-MCNC: 8.8 MG/DL (ref 8.6–10.3)
CHLORIDE BLD-SCNC: 105 MMOL/L (ref 98–107)
CO2 SERPL-SCNC: 24 MMOL/L (ref 21–31)
COLOR UR AUTO: ABNORMAL
CREAT SERPL-MCNC: 0.66 MG/DL (ref 0.6–1.2)
EOSINOPHIL # BLD AUTO: 0 10E3/UL (ref 0–0.7)
EOSINOPHIL NFR BLD AUTO: 0 %
ERYTHROCYTE [DISTWIDTH] IN BLOOD BY AUTOMATED COUNT: 11.7 % (ref 10–15)
GFR SERPL CREATININE-BSD FRML MDRD: >90 ML/MIN/1.73M2
GLUCOSE BLD-MCNC: 111 MG/DL (ref 70–105)
GLUCOSE UR STRIP-MCNC: NEGATIVE MG/DL
HCG SERPL QL: NEGATIVE
HCT VFR BLD AUTO: 37.2 % (ref 35–47)
HGB BLD-MCNC: 12.9 G/DL (ref 11.7–15.7)
HGB UR QL STRIP: NEGATIVE
IMM GRANULOCYTES # BLD: 0 10E3/UL
IMM GRANULOCYTES NFR BLD: 0 %
KETONES UR STRIP-MCNC: NEGATIVE MG/DL
LEUKOCYTE ESTERASE UR QL STRIP: NEGATIVE
LYMPHOCYTES # BLD AUTO: 1.7 10E3/UL (ref 0.8–5.3)
LYMPHOCYTES NFR BLD AUTO: 31 %
MAGNESIUM SERPL-MCNC: 1.9 MG/DL (ref 1.9–2.7)
MCH RBC QN AUTO: 31.3 PG (ref 26.5–33)
MCHC RBC AUTO-ENTMCNC: 34.7 G/DL (ref 31.5–36.5)
MCV RBC AUTO: 90 FL (ref 78–100)
MONOCYTES # BLD AUTO: 0.4 10E3/UL (ref 0–1.3)
MONOCYTES NFR BLD AUTO: 7 %
MUCOUS THREADS #/AREA URNS LPF: PRESENT /LPF
NEUTROPHILS # BLD AUTO: 3.4 10E3/UL (ref 1.6–8.3)
NEUTROPHILS NFR BLD AUTO: 62 %
NITRATE UR QL: NEGATIVE
NRBC # BLD AUTO: 0 10E3/UL
NRBC BLD AUTO-RTO: 0 /100
PH UR STRIP: 6.5 [PH] (ref 5–9)
PLATELET # BLD AUTO: 274 10E3/UL (ref 150–450)
POTASSIUM BLD-SCNC: 3.4 MMOL/L (ref 3.5–5.1)
PROT SERPL-MCNC: 7 G/DL (ref 6.4–8.9)
RBC # BLD AUTO: 4.12 10E6/UL (ref 3.8–5.2)
RBC URINE: 0 /HPF
SODIUM SERPL-SCNC: 139 MMOL/L (ref 134–144)
SP GR UR STRIP: 1.01 (ref 1–1.03)
UROBILINOGEN UR STRIP-MCNC: NORMAL MG/DL
WBC # BLD AUTO: 5.5 10E3/UL (ref 4–11)
WBC URINE: 1 /HPF

## 2022-05-21 PROCEDURE — 80053 COMPREHEN METABOLIC PANEL: CPT | Performed by: PHYSICIAN ASSISTANT

## 2022-05-21 PROCEDURE — 99283 EMERGENCY DEPT VISIT LOW MDM: CPT | Performed by: PHYSICIAN ASSISTANT

## 2022-05-21 PROCEDURE — 250N000013 HC RX MED GY IP 250 OP 250 PS 637: Performed by: PHYSICIAN ASSISTANT

## 2022-05-21 PROCEDURE — 96361 HYDRATE IV INFUSION ADD-ON: CPT | Performed by: PHYSICIAN ASSISTANT

## 2022-05-21 PROCEDURE — 74019 RADEX ABDOMEN 2 VIEWS: CPT | Mod: TC

## 2022-05-21 PROCEDURE — 83735 ASSAY OF MAGNESIUM: CPT | Performed by: PHYSICIAN ASSISTANT

## 2022-05-21 PROCEDURE — 81001 URINALYSIS AUTO W/SCOPE: CPT | Performed by: PHYSICIAN ASSISTANT

## 2022-05-21 PROCEDURE — 36415 COLL VENOUS BLD VENIPUNCTURE: CPT | Performed by: PHYSICIAN ASSISTANT

## 2022-05-21 PROCEDURE — 250N000009 HC RX 250: Performed by: PHYSICIAN ASSISTANT

## 2022-05-21 PROCEDURE — 85025 COMPLETE CBC W/AUTO DIFF WBC: CPT | Performed by: PHYSICIAN ASSISTANT

## 2022-05-21 PROCEDURE — 258N000003 HC RX IP 258 OP 636: Performed by: PHYSICIAN ASSISTANT

## 2022-05-21 PROCEDURE — 84703 CHORIONIC GONADOTROPIN ASSAY: CPT | Performed by: PHYSICIAN ASSISTANT

## 2022-05-21 PROCEDURE — 87040 BLOOD CULTURE FOR BACTERIA: CPT | Mod: 91 | Performed by: PHYSICIAN ASSISTANT

## 2022-05-21 PROCEDURE — 250N000011 HC RX IP 250 OP 636: Performed by: PHYSICIAN ASSISTANT

## 2022-05-21 PROCEDURE — 96374 THER/PROPH/DIAG INJ IV PUSH: CPT | Performed by: PHYSICIAN ASSISTANT

## 2022-05-21 PROCEDURE — 99284 EMERGENCY DEPT VISIT MOD MDM: CPT | Mod: 25 | Performed by: PHYSICIAN ASSISTANT

## 2022-05-21 PROCEDURE — 96375 TX/PRO/DX INJ NEW DRUG ADDON: CPT | Performed by: PHYSICIAN ASSISTANT

## 2022-05-21 RX ORDER — SODIUM CHLORIDE 9 MG/ML
INJECTION, SOLUTION INTRAVENOUS CONTINUOUS
Status: DISCONTINUED | OUTPATIENT
Start: 2022-05-21 | End: 2022-05-22 | Stop reason: HOSPADM

## 2022-05-21 RX ORDER — ONDANSETRON 4 MG/1
4 TABLET, ORALLY DISINTEGRATING ORAL EVERY 6 HOURS PRN
Qty: 20 TABLET | Refills: 0 | Status: SHIPPED | OUTPATIENT
Start: 2022-05-21

## 2022-05-21 RX ORDER — SCOLOPAMINE TRANSDERMAL SYSTEM 1 MG/1
1 PATCH, EXTENDED RELEASE TRANSDERMAL
Qty: 4 PATCH | Refills: 0 | Status: SHIPPED | OUTPATIENT
Start: 2022-05-21 | End: 2023-02-27

## 2022-05-21 RX ORDER — SCOLOPAMINE TRANSDERMAL SYSTEM 1 MG/1
1 PATCH, EXTENDED RELEASE TRANSDERMAL
Status: DISCONTINUED | OUTPATIENT
Start: 2022-05-21 | End: 2022-05-22 | Stop reason: HOSPADM

## 2022-05-21 RX ORDER — POTASSIUM CHLORIDE 20MEQ/15ML
20 LIQUID (ML) ORAL ONCE
Status: COMPLETED | OUTPATIENT
Start: 2022-05-21 | End: 2022-05-21

## 2022-05-21 RX ORDER — ONDANSETRON 2 MG/ML
4 INJECTION INTRAMUSCULAR; INTRAVENOUS EVERY 30 MIN PRN
Status: DISCONTINUED | OUTPATIENT
Start: 2022-05-21 | End: 2022-05-22 | Stop reason: HOSPADM

## 2022-05-21 RX ORDER — PROCHLORPERAZINE MALEATE 10 MG
10 TABLET ORAL EVERY 6 HOURS PRN
Qty: 30 TABLET | Refills: 0 | Status: SHIPPED | OUTPATIENT
Start: 2022-05-21 | End: 2023-02-27

## 2022-05-21 RX ORDER — MAGNESIUM OXIDE 400 MG/1
800 TABLET ORAL ONCE
Status: COMPLETED | OUTPATIENT
Start: 2022-05-21 | End: 2022-05-21

## 2022-05-21 RX ADMIN — SODIUM CHLORIDE 1000 ML: 9 INJECTION, SOLUTION INTRAVENOUS at 18:35

## 2022-05-21 RX ADMIN — POTASSIUM CHLORIDE 20 MEQ: 1.5 SOLUTION ORAL at 20:40

## 2022-05-21 RX ADMIN — ONDANSETRON HYDROCHLORIDE 4 MG: 2 SOLUTION INTRAMUSCULAR; INTRAVENOUS at 18:35

## 2022-05-21 RX ADMIN — SODIUM CHLORIDE: 9 INJECTION, SOLUTION INTRAVENOUS at 22:20

## 2022-05-21 RX ADMIN — PROCHLORPERAZINE EDISYLATE 10 MG: 5 INJECTION INTRAMUSCULAR; INTRAVENOUS at 22:21

## 2022-05-21 RX ADMIN — SODIUM CHLORIDE 1000 ML: 9 INJECTION, SOLUTION INTRAVENOUS at 18:31

## 2022-05-21 RX ADMIN — SCOPALAMINE 1 PATCH: 1 PATCH, EXTENDED RELEASE TRANSDERMAL at 22:22

## 2022-05-21 RX ADMIN — MAGNESIUM OXIDE 800 MG: 400 TABLET ORAL at 20:39

## 2022-05-21 NOTE — ED TRIAGE NOTES
Patient arrives today states had two positive home covid tests and son tested positive at ER today earlier. States having nausea, vomiting and diarrhea. States unable to keep anything down. No SOB or pain. Alert orientated no fever      Triage Assessment     Row Name 05/21/22 3977       Triage Assessment (Adult)    Airway WDL WDL       Respiratory WDL    Respiratory WDL WDL       Skin Circulation/Temperature WDL    Skin Circulation/Temperature WDL WDL       Cardiac WDL    Cardiac WDL WDL       Peripheral/Neurovascular WDL    Peripheral Neurovascular WDL WDL       Cognitive/Neuro/Behavioral WDL    Cognitive/Neuro/Behavioral WDL WDL

## 2022-05-21 NOTE — ED PROVIDER NOTES
History     Chief Complaint   Patient presents with     Nausea, Vomiting, & Diarrhea     HPI  Suzi Miller is a 36 year old female who was diagnosed with COVID on 5/16/2021.  She was started on Paxlovid but she reports she has missed a few doses due to nausea and vomiting.  She is able to keep much down.  The patient reports some loose stools as well.  Tract is called working smarter.  But she feels weak and dehydrated.  She reports her whole family has been dealing with COVID.  Denies any lightheadedness or dizziness.  No chest pain or shortness of breath.  No difficulty breathing.  She has a persistent cough and headache however.  She did not get her COVID vaccination.    Allergies:  Allergies   Allergen Reactions     Penicillins Anaphylaxis     Reglan [Metoclopramide]      seizure       Problem List: Paxil did  Patient Active Problem List    Diagnosis Date Noted     Hard to intubate 04/14/2022     Priority: Medium     Dysmenorrhea 04/14/2022     Priority: Medium     Hashimoto's disease 02/28/2019     Priority: Medium     Restrictive airway disease 05/01/2007     Priority: Medium     Endometriosis 06/01/2002     Priority: Medium        Past Medical History:    Past Medical History:   Diagnosis Date     Endometriosis      Hashimoto's thyroiditis        Past Surgical History:    Past Surgical History:   Procedure Laterality Date     C/SECTION, LOW TRANSVERSE       CLOSED RX BIG TOE FRACTURE  2009    hardware     EXCISE NODULE THYROID       LAPAROSCOPIC HYSTERECTOMY TOTAL, SALPINGECTOMY Bilateral 4/14/2022    Procedure: total laparoscopic hysterectomy, bilateral salpingectomy, cystoscopy, left oophorectomy;  Surgeon: Manuela Loaiza MD;  Location: [facility] [place], CERVICAL  2003       Family History:    Family History   Problem Relation Age of Onset     Early menopause Mother 41     Heart Disease Father      Heart Disease Paternal Grandmother 62     Early menopause Paternal Grandmother        Social  "History:  Marital Status:   [2]  Social History     Tobacco Use     Smoking status: Never Smoker     Smokeless tobacco: Never Used   Vaping Use     Vaping Use: Never used   Substance Use Topics     Alcohol use: Not Currently     Comment: rare     Drug use: Never        Medications:    Acetaminophen (TYLENOL ARTHRITIS EXT RELIEF OR)  acetaminophen (TYLENOL) 325 MG tablet  albuterol (PROAIR HFA/PROVENTIL HFA/VENTOLIN HFA) 108 (90 Base) MCG/ACT inhaler  APAP-Pamabrom-Pyrilamine (PAMPRIN MULTI-SYMPTOM PO)  [employer] MELATONIN GUMMIES PO  EPINEPHrine (ANY BX GENERIC EQUIV) 0.3 MG/0.3ML injection 2-pack  ibuprofen (ADVIL/MOTRIN) 600 MG tablet  ibuprofen (ADVIL/MOTRIN) 800 MG tablet  nirmatrelvir and ritonavir (PAXLOVID) therapy pack  ondansetron (ZOFRAN-ODT) 4 MG ODT tab  senna-docusate (SENOKOT-S/PERICOLACE) 8.6-50 MG tablet  SUMAtriptan (IMITREX) 50 MG tablet          Review of Systems   Constitutional: Positive for activity change, appetite change and chills. Negative for fever.   HENT: Negative for facial swelling and sore throat.    Eyes: Negative for pain.   Respiratory: Positive for cough. Negative for wheezing and stridor.    Cardiovascular: Negative for chest pain.   Gastrointestinal: Positive for nausea and vomiting. Negative for abdominal pain.   Genitourinary: Negative for flank pain.   Musculoskeletal: Positive for myalgias. Negative for back pain and neck pain.   Skin: Negative for pallor.   Neurological: Positive for headaches. Negative for tremors and seizures.   Psychiatric/Behavioral: Negative for agitation.   All other systems reviewed and are negative.      Physical Exam   BP: (!) 147/91  Pulse: 91  Temp: 98.3  F (36.8  C)  Resp: 18  Height: 162.6 cm (5' 4\")  Weight: 93 kg (205 lb)  SpO2: 100 %      Physical Exam  Vitals and nursing note reviewed.   Constitutional:       General: She is not in acute distress.     Appearance: Normal appearance. She is not ill-appearing or toxic-appearing.   HENT:      " Head: Normocephalic. No raccoon eyes, right periorbital erythema or left periorbital erythema.      Right Ear: No drainage or tenderness.      Left Ear: No drainage or tenderness.      Nose: Nose normal.   Eyes:      General: Lids are normal. Gaze aligned appropriately. No scleral icterus.     Extraocular Movements: Extraocular movements intact.   Neck:      Trachea: No tracheal deviation.   Cardiovascular:      Rate and Rhythm: Normal rate.   Pulmonary:      Effort: Pulmonary effort is normal. No respiratory distress.      Breath sounds: No stridor.      Comments: SaO2 is 100% on room air.  Chest:      Chest wall: Tenderness present.   Abdominal:      Tenderness: There is no abdominal tenderness.   Musculoskeletal:         General: No deformity or signs of injury. Normal range of motion.      Cervical back: Normal range of motion. No signs of trauma.   Skin:     General: Skin is warm and dry.      Coloration: Skin is not jaundiced or pale.   Neurological:      General: No focal deficit present.      Mental Status: She is alert and oriented to person, place, and time.      GCS: GCS eye subscore is 4. GCS verbal subscore is 5. GCS motor subscore is 6.      Motor: No tremor or seizure activity.   Psychiatric:         Attention and Perception: Attention normal.         Mood and Affect: Mood normal.         ED Course         Results for orders placed or performed during the hospital encounter of 05/21/22 (from the past 24 hour(s))   CBC with platelets differential    Narrative    The following orders were created for panel order CBC with platelets differential.  Procedure                               Abnormality         Status                     ---------                               -----------         ------                     CBC with platelets and d...[[id number]]                      Final result                 Please view results for these tests on the individual orders.   Comprehensive metabolic panel    Result Value Ref Range    Sodium 139 134 - 144 mmol/L    Potassium 3.4 (L) 3.5 - 5.1 mmol/L    Chloride 105 98 - 107 mmol/L    Carbon Dioxide (CO2) 24 21 - 31 mmol/L    Anion Gap 10 3 - 14 mmol/L    Urea Nitrogen 10 7 - 25 mg/dL    Creatinine 0.66 0.60 - 1.20 mg/dL    Calcium 8.8 8.6 - 10.3 mg/dL    Glucose 111 (H) 70 - 105 mg/dL    Alkaline Phosphatase 41 34 - 104 U/L    AST 15 13 - 39 U/L    ALT 21 7 - 52 U/L    Protein Total 7.0 6.4 - 8.9 g/dL    Albumin 4.1 3.5 - 5.7 g/dL    Bilirubin Total 0.3 0.3 - 1.0 mg/dL    GFR Estimate >90 >60 mL/min/1.73m2   Magnesium   Result Value Ref Range    Magnesium 1.9 1.9 - 2.7 mg/dL   HCG qualitative Blood   Result Value Ref Range    hCG Serum Qualitative Negative Negative   Blood Culture Artery, Brachial, Right    Specimen: Artery, Brachial, Right; Blood   Result Value Ref Range    Culture No growth after 12 hours    Blood Culture Artery, Brachial, Right    Specimen: Artery, Brachial, Right; Blood   Result Value Ref Range    Culture No growth after 12 hours    CBC with platelets and differential   Result Value Ref Range    WBC Count 5.5 4.0 - 11.0 10e3/uL    RBC Count 4.12 3.80 - 5.20 10e6/uL    Hemoglobin 12.9 11.7 - 15.7 g/dL    Hematocrit 37.2 35.0 - 47.0 %    MCV 90 78 - 100 fL    MCH 31.3 26.5 - 33.0 pg    MCHC 34.7 31.5 - 36.5 g/dL    RDW 11.7 10.0 - 15.0 %    Platelet Count 274 150 - 450 10e3/uL    % Neutrophils 62 %    % Lymphocytes 31 %    % Monocytes 7 %    % Eosinophils 0 %    % Basophils 0 %    % Immature Granulocytes 0 %    NRBCs per 100 WBC 0 <1 /100    Absolute Neutrophils 3.4 1.6 - 8.3 10e3/uL    Absolute Lymphocytes 1.7 0.8 - 5.3 10e3/uL    Absolute Monocytes 0.4 0.0 - 1.3 10e3/uL    Absolute Eosinophils 0.0 0.0 - 0.7 10e3/uL    Absolute Basophils 0.0 0.0 - 0.2 10e3/uL    Absolute Immature Granulocytes 0.0 <=0.4 10e3/uL    Absolute NRBCs 0.0 10e3/uL   UA with Microscopic reflex to Culture    Specimen: Urine, Clean Catch   Result Value Ref Range    Color Urine  Light Yellow Colorless, Straw, Light Yellow, Yellow    Appearance Urine Clear Clear    Glucose Urine Negative Negative mg/dL    Bilirubin Urine Negative Negative    Ketones Urine Negative Negative mg/dL    Specific Gravity Urine 1.011 1.000 - 1.030    Blood Urine Negative Negative    pH Urine 6.5 5.0 - 9.0    Protein Albumin Urine Negative Negative mg/dL    Urobilinogen Urine Normal Normal, 2.0 mg/dL    Nitrite Urine Negative Negative    Leukocyte Esterase Urine Negative Negative    Bacteria Urine Few (A) None Seen /HPF    Mucus Urine Present (A) None Seen /LPF    RBC Urine 0 <=2 /HPF    WBC Urine 1 <=5 /HPF    Narrative    Urine Culture not indicated   XR Abdomen 2 Views    Narrative    PROCEDURE INFORMATION:   Exam: XR Abdomen   Exam date and time: 5/21/2022 8:35 PM   Age: 36 years old   Clinical indication: Nausea and vomiting; Additional info: Covid +, nausea and   vomiting     TECHNIQUE:   Imaging protocol: XR of the abdomen.   Views: 2 Views. Upright and supine views.     COMPARISON:   CT ABDOMEN PELVIS W CONTRAST 3/21/2022 12:13 AM     FINDINGS:   Gastrointestinal tract: Normal. No bowel dilation.   Intraperitoneal space: Normal. No free air.   Bones/joints: Unremarkable for age.       Impression    IMPRESSION:   No acute findings.     THIS DOCUMENT HAS BEEN ELECTRONICALLY SIGNED BY BEATRIZ BRAY MD       Medications   0.9% sodium chloride BOLUS (0 mLs Intravenous Stopped 5/21/22 2131)     Followed by   0.9% sodium chloride BOLUS (0 mLs Intravenous Stopped 5/21/22 2131)   magnesium oxide (MAG-OX) tablet 800 mg (800 mg Oral Given 5/21/22 2039)   potassium chloride (KAYCIEL) solution 20 mEq (20 mEq Oral Given 5/21/22 2040)   prochlorperazine (COMPAZINE) injection 10 mg (10 mg Intravenous Given 5/21/22 2221)       Assessments & Plan (with Medical Decision Making)     I have reviewed the nursing notes.    I have reviewed the findings, diagnosis, plan and need for follow up with the patient.      Discharge  Medication List as of 5/21/2022 10:46 PM      START taking these medications    Details   prochlorperazine (COMPAZINE) 10 MG tablet Take 1 tablet (10 mg) by mouth every 6 hours as needed for nausea or vomiting, Disp-30 tablet, R-0, Local Print      scopolamine (TRANSDERM) 1 MG/3DAYS 72 hr patch Place 1 patch onto the skin every 72 hours, Disp-4 patch, R-0, Local Print         Zofran 4 mg ODT 1 tablet p.o. q. 6-8 hours as needed for nausea and vomiting.  30 tablets    Final diagnoses:   Non-intractable vomiting with nausea, unspecified vomiting type   History of COVID-19     Afebrile.  Vital signs stable.  Patient with recent diagnosis of COVID and has had a 5-day course of Paxlovid.  She reports continued body aches, nausea and vomiting and fatigue.  IV was established and she was given fluids and Zofran initially.  CBC shows normal white blood cells no left shift.  CMP shows minimal decrease in potassium at 3.4.  Her magnesium is low end of normal as well at 1.9.  She was given oral replacement.  Blood cultures are pending.  UA shows no signs of UTI.  Her nausea persisted and she was given Compazine.  Gradually with fluids and time she is feeling much better with the above treatment.  Rx for Zofran, Compazine and scopolamine patches.  I discussed symptomatic support and continued monitoring return if there is any concerns for further evaluation as needed.  I explained my diagnostic considerations and recommendations and the patient voiced an understanding and was in agreement with the treatment plan. All questions were answered to the best of my ability.  We discussed potential side effects of any prescribed or recommended therapies, as well as expectations for response to treatments.  \  5/21/2022   [facility] AND [facility]     Mikhail Brown PA-C  05/22/22 2520

## 2022-05-22 ASSESSMENT — ENCOUNTER SYMPTOMS
BACK PAIN: 0
STRIDOR: 0
VOMITING: 1
MYALGIAS: 1
TREMORS: 0
EYE PAIN: 0
SEIZURES: 0
HEADACHES: 1
SORE THROAT: 0
WHEEZING: 0
ACTIVITY CHANGE: 1
NAUSEA: 1
FEVER: 0
FACIAL SWELLING: 0
CHILLS: 1
ABDOMINAL PAIN: 0
NECK PAIN: 0
APPETITE CHANGE: 1
COUGH: 1
AGITATION: 0
FLANK PAIN: 0

## 2022-05-26 LAB
BACTERIA BLD CULT: NO GROWTH
BACTERIA BLD CULT: NO GROWTH

## 2022-05-27 ENCOUNTER — OFFICE VISIT (OUTPATIENT)
Dept: OBGYN | Facility: OTHER | Age: 37
End: 2022-05-27
Attending: OBSTETRICS & GYNECOLOGY
Payer: COMMERCIAL

## 2022-05-27 VITALS
HEART RATE: 100 BPM | TEMPERATURE: 98.4 F | WEIGHT: 204.9 LBS | HEIGHT: 64 IN | DIASTOLIC BLOOD PRESSURE: 80 MMHG | OXYGEN SATURATION: 98 % | BODY MASS INDEX: 34.98 KG/M2 | SYSTOLIC BLOOD PRESSURE: 120 MMHG | RESPIRATION RATE: 16 BRPM

## 2022-05-27 DIAGNOSIS — Z90.710 S/P LAPAROSCOPIC HYSTERECTOMY: Primary | ICD-10-CM

## 2022-05-27 DIAGNOSIS — R19.7 DIARRHEA, UNSPECIFIED TYPE: ICD-10-CM

## 2022-05-27 PROCEDURE — 99024 POSTOP FOLLOW-UP VISIT: CPT | Performed by: OBSTETRICS & GYNECOLOGY

## 2022-05-27 ASSESSMENT — PAIN SCALES - GENERAL: PAINLEVEL: NO PAIN (0)

## 2022-05-27 NOTE — NURSING NOTE
"Chief Complaint   Patient presents with     Surgical Followup     Lap hysterectomy         Initial /80   Pulse 100   Temp 98.4  F (36.9  C) (Tympanic)   Resp 16   Ht 1.626 m (5' 4\")   Wt 92.9 kg (204 lb 14.4 oz)   LMP 03/27/2022   SpO2 98%   Breastfeeding No   BMI 35.17 kg/m   Estimated body mass index is 35.17 kg/m  as calculated from the following:    Height as of this encounter: 1.626 m (5' 4\").    Weight as of this encounter: 92.9 kg (204 lb 14.4 oz).         Norma J. Gosselin, EDU   "

## 2022-05-27 NOTE — PROGRESS NOTES
"Postoperative Visit  2022    S: Suzi Miller is a 37 year old  here for post-operative visit following TLH, FLORENTIN, LO, cysto on 22. She reports persistent diarrhea since surgery. 4-8 episodes of very loose to watery stools per day, foul smelling. Has not been getting better. Bladder still a little uncomfortable with voiding but getting better. Recent COVID infection, wonders if that has made the diarrhea worse. Has not had the same pain since surgery that she was having prior to surgery.    O:   /80   Pulse 100   Temp 98.4  F (36.9  C) (Tympanic)   Resp 16   Ht 1.626 m (5' 4\")   Wt 92.9 kg (204 lb 14.4 oz)   LMP 2022   SpO2 98%   Breastfeeding No   BMI 35.17 kg/m    Gen:  Well-appearing, NAD  Pelvic: normal external genitalia. Normal vagina. Cuff well healed. No pelvic masses or tenderness    A/P:   Ms. Suzi Miller is a 37 year old  six weeks s/p TLH, BS, LO, cysto. Recommend checking for C diff due to prolonged diarrhea. Will call with results.   Otherwise RTC prn    Manuela Loaiza MD  OB/GYN  2022 10:22 AM     "

## 2022-05-31 ENCOUNTER — LAB (OUTPATIENT)
Dept: LAB | Facility: OTHER | Age: 37
End: 2022-05-31
Attending: OBSTETRICS & GYNECOLOGY
Payer: COMMERCIAL

## 2022-05-31 DIAGNOSIS — R19.7 DIARRHEA, UNSPECIFIED TYPE: ICD-10-CM

## 2022-05-31 LAB
C DIFF TOX B STL QL: NEGATIVE
RIBOTYPE 027/NAP1/BI: NORMAL

## 2022-05-31 PROCEDURE — 87493 C DIFF AMPLIFIED PROBE: CPT | Mod: ZL

## 2022-10-18 ENCOUNTER — TELEPHONE (OUTPATIENT)
Dept: OBGYN | Facility: OTHER | Age: 37
End: 2022-10-18

## 2022-10-18 NOTE — TELEPHONE ENCOUNTER
Patient reports sharp/pinching/pulling/stabbing pain in LLQ, similar to where she felt pain from ovarian cyst. This has been ongoing for several months. She notes she has also been having frequent loose stools since surgery, no C. Diff. She was advised to follow up with gyn for eval, may also see FP if there is sooner availability.

## 2022-10-18 NOTE — TELEPHONE ENCOUNTER
Suzi left a message on voicemail that she has a Hysterectomy in April and has been having increasingly painful left sided pelvic pain.  Zaida York on 10/18/2022 at 2:55 PM

## 2022-11-02 ENCOUNTER — OFFICE VISIT (OUTPATIENT)
Dept: FAMILY MEDICINE | Facility: OTHER | Age: 37
End: 2022-11-02
Attending: NURSE PRACTITIONER
Payer: COMMERCIAL

## 2022-11-02 VITALS
SYSTOLIC BLOOD PRESSURE: 124 MMHG | HEART RATE: 92 BPM | BODY MASS INDEX: 37.56 KG/M2 | DIASTOLIC BLOOD PRESSURE: 80 MMHG | TEMPERATURE: 97.8 F | WEIGHT: 220 LBS | HEIGHT: 64 IN | OXYGEN SATURATION: 99 % | RESPIRATION RATE: 16 BRPM

## 2022-11-02 DIAGNOSIS — H66.001 NON-RECURRENT ACUTE SUPPURATIVE OTITIS MEDIA OF RIGHT EAR WITHOUT SPONTANEOUS RUPTURE OF TYMPANIC MEMBRANE: Primary | ICD-10-CM

## 2022-11-02 PROCEDURE — 99213 OFFICE O/P EST LOW 20 MIN: CPT | Performed by: NURSE PRACTITIONER

## 2022-11-02 RX ORDER — AZITHROMYCIN 250 MG/1
TABLET, FILM COATED ORAL
Qty: 6 TABLET | Refills: 0 | Status: SHIPPED | OUTPATIENT
Start: 2022-11-02 | End: 2022-11-07

## 2022-11-02 ASSESSMENT — ASTHMA QUESTIONNAIRES: ACT_TOTALSCORE: 25

## 2022-11-02 ASSESSMENT — PATIENT HEALTH QUESTIONNAIRE - PHQ9
10. IF YOU CHECKED OFF ANY PROBLEMS, HOW DIFFICULT HAVE THESE PROBLEMS MADE IT FOR YOU TO DO YOUR WORK, TAKE CARE OF THINGS AT HOME, OR GET ALONG WITH OTHER PEOPLE: SOMEWHAT DIFFICULT
SUM OF ALL RESPONSES TO PHQ QUESTIONS 1-9: 1
SUM OF ALL RESPONSES TO PHQ QUESTIONS 1-9: 1

## 2022-11-02 ASSESSMENT — PAIN SCALES - GENERAL: PAINLEVEL: MODERATE PAIN (4)

## 2022-11-02 NOTE — LETTER
My Asthma Action Plan    Name: Suzi Miller   YOB: 1985  Date: 11/2/2022   My doctor: ETHEL Yan CNP   My clinic: [facility]        My Rescue Medicine:   Albuterol inhaler (Proair/Ventolin/Proventil HFA)  2-4 puffs EVERY 4 HOURS as needed. Use a spacer if recommended by your provider.   My Asthma Severity:   Intermittent / Exercise Induced  Know your asthma triggers: upper respiratory infections and cold air             GREEN ZONE   Good Control    I feel good    No cough or wheeze    Can work, sleep and play without asthma symptoms       Take your asthma control medicine every day.     1. If exercise triggers your asthma, take your rescue medication    15 minutes before exercise or sports, and    During exercise if you have asthma symptoms  2. Spacer to use with inhaler: If you have a spacer, make sure to use it with your inhaler             YELLOW ZONE Getting Worse  I have ANY of these:    I do not feel good    Cough or wheeze    Chest feels tight    Wake up at night   1. Keep taking your Green Zone medications  2. Start taking your rescue medicine:    every 20 minutes for up to 1 hour. Then every 4 hours for 24-48 hours.  3. If you stay in the Yellow Zone for more than 12-24 hours, contact your doctor.  4. If you do not return to the Green Zone in 12-24 hours or you get worse, start taking your oral steroid medicine if prescribed by your provider.           RED ZONE Medical Alert - Get Help  I have ANY of these:    I feel awful    Medicine is not helping    Breathing getting harder    Trouble walking or talking    Nose opens wide to breathe       1. Take your rescue medicine NOW  2. If your provider has prescribed an oral steroid medicine, start taking it NOW  3. Call your doctor NOW  4. If you are still in the Red Zone after 20 minutes and you have not reached your doctor:    Take your rescue medicine again and    Call 911 or go to the emergency room right  away    See your regular doctor within 2 weeks of an Emergency Room or Urgent Care visit for follow-up treatment.          Annual Reminders:  Meet with Asthma Educator,  Flu Shot in the Fall, consider Pneumonia Vaccination for patients with asthma (aged 19 and older).    [id number] [address]    Electronically signed by ETHEL Yan CNP   Date: 11/02/22                    Asthma Triggers  How To Control Things That Make Your Asthma Worse    Triggers are things that make your asthma worse.  Look at the list below to help you find your triggers and   what you can do about them. You can help prevent asthma flare-ups by staying away from your triggers.      Trigger                                                          What you can do   Cigarette Smoke  Tobacco smoke can make asthma worse. Do not allow smoking in your home, car or around you.  Be sure no one smokes at a child s day care or school.  If you smoke, ask your health care provider for ways to help you quit.  Ask family members to quit too.  Ask your health care provider for a referral to Quit Plan to help you quit smoking, or call 7-243-951-PLAN.     Colds, Flu, Bronchitis  These are common triggers of asthma. Wash your hands often.  Don t touch your eyes, nose or mouth.  Get a flu shot every year.     Dust Mites  These are tiny bugs that live in cloth or carpet. They are too small to see. Wash sheets and blankets in hot water every week.   Encase pillows and mattress in dust mite proof covers.  Avoid having carpet if you can. If you have carpet, vacuum weekly.   Use a dust mask and HEPA vacuum.   Pollen and Outdoor Mold  Some people are allergic to trees, grass, or weed pollen, or molds. Try to keep your windows closed.  Limit time out doors when pollen count is high.   Ask you health care provider about taking medicine during allergy season.     Animal Dander  Some people  are allergic to skin flakes, urine or saliva from pets with fur or feathers. Keep pets with fur or feathers out of your home.    If you can t keep the pet outdoors, then keep the pet out of your bedroom.  Keep the bedroom door closed.  Keep pets off cloth furniture and away from stuffed toys.     Mice, Rats, and Cockroaches  Some people are allergic to the waste from these pests.   Cover food and garbage.  Clean up spills and food crumbs.  Store grease in the refrigerator.   Keep food out of the bedroom.   Indoor Mold  This can be a trigger if your home has high moisture. Fix leaking faucets, pipes, or other sources of water.   Clean moldy surfaces.  Dehumidify basement if it is damp and smelly.   Smoke, Strong Odors, and Sprays  These can reduce air quality. Stay away from strong odors and sprays, such as perfume, powder, hair spray, paints, smoke incense, paint, cleaning products, candles and new carpet.   Exercise or Sports  Some people with asthma have this trigger. Be active!  Ask your doctor about taking medicine before sports or exercise to prevent symptoms.    Warm up for 5-10 minutes before and after sports or exercise.     Other Triggers of Asthma  Cold air:  Cover your nose and mouth with a scarf.  Sometimes laughing or crying can be a trigger.  Some medicines and food can trigger asthma.

## 2022-11-02 NOTE — PROGRESS NOTES
Assessment & Plan   Problem List Items Addressed This Visit    None  Visit Diagnoses     Non-recurrent acute suppurative otitis media of right ear without spontaneous rupture of tympanic membrane    -  Primary    Relevant Medications    azithromycin (ZITHROMAX) 250 MG tablet      We did discuss early onset right otitis media.  Reviewed symptomatic management, watch and wait versus antibiotic treatment.  At this time she would like to move forward with antibiotic treatment given the severity of her symptoms.  She is allergic to penicillin so azithromycin was used today.  Follow-up as needed.    Prescription drug management  22 minutes spent on the date of the encounter doing chart review, history and exam, documentation and further activities per the note       No follow-ups on file.    ETHEL Yan [facility]    Subjective   Suzi is a 37 year old, presenting for the following health issues:  Ear Problem      History of Present Illness       Reason for visit:  Ear ache  Symptom onset:  1-3 days ago    She eats 2-3 servings of fruits and vegetables daily.She consumes 1 sweetened beverage(s) daily.She exercises with enough effort to increase her heart rate 20 to 29 minutes per day.  She exercises with enough effort to increase her heart rate 3 or less days per week.   She is taking medications regularly.    Today's PHQ-9         PHQ-9 Total Score: 1    PHQ-9 Q9 Thoughts of better off dead/self-harm past 2 weeks :   Not at all    How difficult have these problems made it for you to do your work, take care of things at home, or get along with other people: Somewhat difficult     She comes in today for vertigo and ear pain this been present since Saturday.  She denies any recent cold symptoms.  Denies any fevers.  Pain has been bilaterally.  Has a full feeling but no plugged sensation.  Has been taking Tylenol which has been minimally helpful.  No recent travel.  No history of  "recurrent ear infections.    Review of Systems   See above      Objective    /80   Pulse 92   Temp 97.8  F (36.6  C)   Resp 16   Ht 1.626 m (5' 4\")   Wt 99.8 kg (220 lb)   LMP 03/27/2022   SpO2 99%   BMI 37.76 kg/m    Body mass index is 37.76 kg/m .  Physical Exam   GENERAL: healthy, alert and no distress  EYES: Eyes grossly normal to inspection, PERRL and conjunctivae and sclerae normal  HENT: Bilateral TMs with clear fluid, mild erythema on right, nose and mouth without ulcers or lesions  NECK: no adenopathy, no asymmetry, masses, or scars and thyroid normal to palpation  RESP: lungs clear to auscultation - no rales, rhonchi or wheezes  CV: regular rate and rhythm, normal S1 S2  NEURO: Normal strength and tone, mentation intact and speech normal  PSYCH: mentation appears normal, affect normal/bright                    "

## 2022-11-02 NOTE — NURSING NOTE
Patient presents today for ear pain and vertigo that started Saturday.    Medication Reconciliation Complete    Radha Jefferson LPN  11/2/2022 1:49 PM

## 2023-02-27 ENCOUNTER — OFFICE VISIT (OUTPATIENT)
Dept: FAMILY MEDICINE | Facility: OTHER | Age: 38
End: 2023-02-27
Attending: NURSE PRACTITIONER
Payer: COMMERCIAL

## 2023-02-27 VITALS
OXYGEN SATURATION: 97 % | BODY MASS INDEX: 36.88 KG/M2 | HEIGHT: 64 IN | SYSTOLIC BLOOD PRESSURE: 110 MMHG | TEMPERATURE: 99.2 F | HEART RATE: 111 BPM | RESPIRATION RATE: 12 BRPM | WEIGHT: 216 LBS | DIASTOLIC BLOOD PRESSURE: 90 MMHG

## 2023-02-27 DIAGNOSIS — R11.2 INTRACTABLE NAUSEA AND VOMITING: Primary | ICD-10-CM

## 2023-02-27 DIAGNOSIS — G43.001 MIGRAINE WITHOUT AURA AND WITH STATUS MIGRAINOSUS, NOT INTRACTABLE: ICD-10-CM

## 2023-02-27 PROCEDURE — 99214 OFFICE O/P EST MOD 30 MIN: CPT | Performed by: FAMILY MEDICINE

## 2023-02-27 PROCEDURE — 96372 THER/PROPH/DIAG INJ SC/IM: CPT | Performed by: FAMILY MEDICINE

## 2023-02-27 PROCEDURE — 250N000011 HC RX IP 250 OP 636: Performed by: FAMILY MEDICINE

## 2023-02-27 RX ORDER — HYDROCORTISONE 25 MG/G
CREAM TOPICAL 2 TIMES DAILY PRN
Qty: 30 G | Refills: 1 | Status: SHIPPED | OUTPATIENT
Start: 2023-02-27

## 2023-02-27 RX ORDER — KETOROLAC TROMETHAMINE 30 MG/ML
30 INJECTION, SOLUTION INTRAMUSCULAR; INTRAVENOUS ONCE
Status: COMPLETED | OUTPATIENT
Start: 2023-02-27 | End: 2023-02-27

## 2023-02-27 RX ADMIN — KETOROLAC TROMETHAMINE 30 MG: 30 INJECTION, SOLUTION INTRAMUSCULAR at 10:52

## 2023-02-27 RX ADMIN — PROCHLORPERAZINE EDISYLATE 10 MG: 5 INJECTION INTRAMUSCULAR; INTRAVENOUS at 10:48

## 2023-02-27 ASSESSMENT — PAIN SCALES - GENERAL: PAINLEVEL: SEVERE PAIN (6)

## 2023-02-27 NOTE — NURSING NOTE
"Pt presents to  for migraines, vomiting, diarrhea, rectal bleeding, and dehydration. Pt states migraines started on Thursday, 2/23/23 and all other symptoms started last night.  Pt has tried taking Tums, Pepto, Anti-Diarrhea, Zofran, and Sumatriptan. Last dose of Zofran 0450, Pepto 0845.  Pt complaining of LLQ pain that has been present for a while, but worsened last night.      Chief Complaint   Patient presents with     Vomiting     Diarrhea     Rectal Problem     Bleeding       FOOD SECURITY SCREENING QUESTIONS  Hunger Vital Signs:  Within the past 12 months we worried whether our food would run out before we got money to buy more. Never  Within the past 12 months the food we bought just didn't last and we didn't have money to get more. Never  Domitila Dearmon 2/27/2023 10:25 AM      Initial BP (!) 110/90 (BP Location: Left arm, Patient Position: Sitting, Cuff Size: Adult Large)   Pulse 111   Temp 99.2  F (37.3  C) (Tympanic)   Resp 12   Ht 1.626 m (5' 4\")   Wt 98 kg (216 lb)   LMP 03/27/2022   SpO2 97%   BMI 37.08 kg/m   Estimated body mass index is 37.08 kg/m  as calculated from the following:    Height as of this encounter: 1.626 m (5' 4\").    Weight as of this encounter: 98 kg (216 lb).  Medication Reconciliation: complete    Domitila Deajuanaon    "

## 2023-02-27 NOTE — PATIENT INSTRUCTIONS
We gave Compazine for nausea and Toradol for migraine.  Drink frequent small amounts of fluid.  You  should drink some water and something containing electrolytes like sports drinks or soup broth to maintain hydration.  If you cannot keep fluids down and feel like you are getting worse, then had to the ER for IV fluids.  No labs drawn at this time, but if worse, then we should get lab work  Hydrocortisone twice daily for hemorrhoids. Use short-term. If bleeding continues for days, then return for evaluation

## 2023-02-27 NOTE — PROGRESS NOTES
Assessment & Plan       ICD-10-CM    1. Intractable nausea and vomiting  R11.2 ketorolac (TORADOL) injection 30 mg     prochlorperazine (COMPAZINE) injection 10 mg     hydrocortisone, Perianal, (HYDROCORTISONE) 2.5 % cream      2. Migraine without aura and with status migrainosus, not intractable  G43.001         Migraine symptoms for 3 days prior to onset of nausea, vomiting, diarrhea.  New symptoms are consistent with gastroenteritis.  Had sweats and chills.  She is concerned about dehydration.  We do not have IV fluids in the [facility] clinic.  She would need to go to the ER.  However, severe dehydration is not likely since her symptoms started last night.  Offered the option of antiemetic to see if she could hydrate versus going to the ER now for IV fluids.  She elected to try treatment through [facility] clinic.    Already took Zofran ODT at home, but vomited after.  Given IM Compazine 10 mg.  Given ketorolac 30 mg IM due to migraine.  Drink frequent small amounts of fluid.  Some water, some fluids with electrolytes.  If getting worse, then had to the ER.    She has some left lower quadrant tenderness on exam.  This could be consistent with gastroenteritis or could represent diverticulitis.  However with mild diverticulitis antibiotics are not required and so treatment would not change.  We discussed potentially obtaining labs to aid in diagnosis, but at this point would not , so deferred labs at this time.    Rectal bleeding is from hemorrhoids.  Treat with hydrocortisone cream twice daily.  She also has some rectal tags and is aware these will not resolve with hydrocortisone.  Treat until swelling and bleeding have improved, then discontinue steroid use.    Follow up as needed       Mendel Kwon MD     [facility]      SUBJECTIVE:  37 year old female presents to [facility] for nausea, vomiting, diarrhea.  Migraine since last week, 2/23.  Has felt fatigued, but did not have  these other symptoms.  Sweats and chills starting last night. Vomiting and diarrhea. Tried numerous therapies below  Watery diarrhea. Bright blood.  Some mucus.  She has had hemorrhoids in the past.  Is worried about dehydration since she cannot keep fluids down.      REVIEW OF SYSTEMS:    As above    Current Outpatient Medications   Medication Sig Dispense Refill     albuterol (PROAIR HFA/PROVENTIL HFA/VENTOLIN HFA) 108 (90 Base) MCG/ACT inhaler Inhale 2 puffs into the lungs every 6 hours as needed for shortness of breath / dyspnea or wheezing 18 g 0     APAP-Pamabrom-Pyrilamine (PAMPRIN MULTI-SYMPTOM PO) Take by mouth At Bedtime       EPINEPHrine (ANY BX GENERIC EQUIV) 0.3 MG/0.3ML injection 2-pack Inject 0.3 mLs (0.3 mg) into the muscle once as needed for anaphylaxis 1 each 0     ondansetron (ZOFRAN ODT) 4 MG ODT tab Take 1 tablet (4 mg) by mouth every 6 hours as needed for nausea 20 tablet 0     SUMAtriptan (IMITREX) 50 MG tablet Take 1 tablet (50 mg) by mouth at onset of headache for migraine May repeat in 2 hours. Max 4 tablets/24 hours. 90 tablet 3     UNABLE TO FIND Take 4 chew tab by mouth daily Juice Plus - 2 fruit, 2 vegetable       UNABLE TO FIND Take 1 tablet by mouth daily Oh Joint       Acetaminophen (TYLENOL ARTHRITIS EXT RELIEF OR) Take 2 tablets by mouth daily  (Patient not taking: Reported on 2/27/2023)       ibuprofen (ADVIL/MOTRIN) 800 MG tablet Take 1 tablet (800 mg) by mouth every 6 hours as needed for other (mild and/or inflammatory pain) (Patient not taking: Reported on 2/27/2023) 30 tablet 0     prochlorperazine (COMPAZINE) 10 MG tablet Take 1 tablet (10 mg) by mouth every 6 hours as needed for nausea or vomiting (Patient not taking: Reported on 11/2/2022) 30 tablet 0     scopolamine (TRANSDERM) 1 MG/3DAYS 72 hr patch Place 1 patch onto the skin every 72 hours (Patient not taking: Reported on 11/2/2022) 4 patch 0     Allergies   Allergen Reactions     Penicillins Anaphylaxis     Reglan  "[Metoclopramide]      seizure       OBJECTIVE:  BP (!) 110/90 (BP Location: Left arm, Patient Position: Sitting, Cuff Size: Adult Large)   Pulse 111   Temp 99.2  F (37.3  C) (Tympanic)   Resp 12   Ht 1.626 m (5' 4\")   Wt 98 kg (216 lb)   LMP 03/27/2022   SpO2 97%   BMI 37.08 kg/m      EXAM:  General Appearance: Alert. No acute distress  Chest/Respiratory Exam: Clear to auscultation bilaterally  Cardiovascular Exam: Regular rate and rhythm. S1, S2, no murmur, gallop, or rubs.  Gastrointestinal Exam: Soft, tender in LLQ, no abnormal masses or organomegaly.  Rectal: Exam performed with nurse. Has a few rectal tags, no rectal fissure. Fullness at rectum consistent with internal hemorrhoids.  Extremities: No lower extremity edema.  Psychiatric: Normal affect and mentation      Nursing Notes:   Domitila Hernandez  2/27/2023 10:26 AM  Signed  Pt presents to  for migraines, vomiting, diarrhea, rectal bleeding, and dehydration. Pt states migraines started on Thursday, 2/23/23 and all other symptoms started last night.  Pt has tried taking Tums, Pepto, Anti-Diarrhea, Zofran, and Sumatriptan. Last dose of Zofran 0450, Pepto 0845.  Pt complaining of LLQ pain that has been present for a while, but worsened last night.      Chief Complaint   Patient presents with     Vomiting     Diarrhea     Rectal Problem     Bleeding       FOOD SECURITY SCREENING QUESTIONS  Hunger Vital Signs:  Within the past 12 months we worried whether our food would run out before we got money to buy more. Never  Within the past 12 months the food we bought just didn't last and we didn't have money to get more. Poppy Hernandez 2/27/2023 10:25 AM      Initial BP (!) 110/90 (BP Location: Left arm, Patient Position: Sitting, Cuff Size: Adult Large)   Pulse 111   Temp 99.2  F (37.3  C) (Tympanic)   Resp 12   Ht 1.626 m (5' 4\")   Wt 98 kg (216 lb)   LMP 03/27/2022   SpO2 97%   BMI 37.08 kg/m   Estimated body mass index is 37.08 kg/m  as " "calculated from the following:    Height as of this encounter: 1.626 m (5' 4\").    Weight as of this encounter: 98 kg (216 lb).  Medication Reconciliation: complete    Domitila Dearmon         "

## 2023-09-07 ENCOUNTER — OFFICE VISIT (OUTPATIENT)
Dept: FAMILY MEDICINE | Facility: OTHER | Age: 38
End: 2023-09-07
Attending: NURSE PRACTITIONER
Payer: COMMERCIAL

## 2023-09-07 VITALS
WEIGHT: 219.2 LBS | HEART RATE: 108 BPM | SYSTOLIC BLOOD PRESSURE: 132 MMHG | OXYGEN SATURATION: 98 % | BODY MASS INDEX: 37.63 KG/M2 | DIASTOLIC BLOOD PRESSURE: 86 MMHG | TEMPERATURE: 98.3 F

## 2023-09-07 DIAGNOSIS — Z86.16 HISTORY OF COVID-19: ICD-10-CM

## 2023-09-07 DIAGNOSIS — J98.8 RESPIRATORY INFECTION: Primary | ICD-10-CM

## 2023-09-07 PROCEDURE — 99213 OFFICE O/P EST LOW 20 MIN: CPT | Performed by: FAMILY MEDICINE

## 2023-09-07 RX ORDER — BENZONATATE 200 MG/1
200 CAPSULE ORAL 3 TIMES DAILY PRN
Qty: 15 CAPSULE | Refills: 0 | Status: SHIPPED | OUTPATIENT
Start: 2023-09-07

## 2023-09-07 RX ORDER — AZITHROMYCIN 250 MG/1
TABLET, FILM COATED ORAL
Qty: 6 TABLET | Refills: 0 | Status: SHIPPED | OUTPATIENT
Start: 2023-09-07 | End: 2023-09-12

## 2023-09-07 RX ORDER — PREDNISONE 20 MG/1
TABLET ORAL
Qty: 12 TABLET | Refills: 0 | Status: SHIPPED | OUTPATIENT
Start: 2023-09-07

## 2023-09-07 ASSESSMENT — PAIN SCALES - GENERAL: PAINLEVEL: MODERATE PAIN (5)

## 2023-09-07 NOTE — PROGRESS NOTES
(J98.8) Respiratory infection  (primary encounter diagnosis)  Comment:   Plan: azithromycin (ZITHROMAX) 250 MG tablet,         predniSONE (DELTASONE) 20 MG tablet,         benzonatate (TESSALON) 200 MG capsule            (Z86.16) History of COVID-19  Comment:   Plan:         CHIEF COMPLAINT    Cough.  Tested positive for COVID a week ago.      HISTORY    Suzi is a 38-year-old woman who comes in with cough for 2 days.  She is getting a scant amount of yellowish sputum.  Chest feels kind of tight.  She has not checked her temperature.    She tells me that a week ago, she had a sore throat and some body aches and fatigue.  At that time she did a home COVID test which was positive.  She did some self-isolation.    She has a history of having COVID 5-2022.  She has not had COVID vaccinations or boosters.  She does have a remote history of asthma.  Has an albuterol inhaler but is not using.      REVIEW OF SYSTEMS    No known fever.  No sore throat or ear pain.  No chest pain.  No nausea, vomiting, diarrhea.  No skin rashes.      EXAM  /86   Pulse 108   Temp 98.3  F (36.8  C) (Tympanic)   Wt 99.4 kg (219 lb 3.2 oz)   LMP 03/27/2022   SpO2 98%   BMI 37.63 kg/m      NAD.  Frequent cough.  Conjunctiva clear.  Pharynx without inflammation or swelling.  No significant cervical adenopathy or thyromegaly.  Lungs are clear.  Moving air well.  Cardiac RSR without murmur.  Skin without rashes.  Ambulating comfortably.

## 2023-09-07 NOTE — NURSING NOTE
Patient presents to clinic for concerns of bad cough after covid  /86   Pulse 108   Temp 98.3  F (36.8  C) (Tympanic)   Wt 99.4 kg (219 lb 3.2 oz)   LMP 03/27/2022   SpO2 98%   BMI 37.63 kg/m    Chante Santos LPN on 9/7/2023 at 2:29 PM

## 2024-04-05 ENCOUNTER — HOSPITAL ENCOUNTER (EMERGENCY)
Facility: OTHER | Age: 39
Discharge: HOME | End: 2024-04-06
Attending: STUDENT IN AN ORGANIZED HEALTH CARE EDUCATION/TRAINING PROGRAM | Admitting: STUDENT IN AN ORGANIZED HEALTH CARE EDUCATION/TRAINING PROGRAM
Payer: COMMERCIAL

## 2024-04-05 DIAGNOSIS — R10.13 EPIGASTRIC PAIN: ICD-10-CM

## 2024-04-05 PROCEDURE — 84484 ASSAY OF TROPONIN QUANT: CPT | Performed by: STUDENT IN AN ORGANIZED HEALTH CARE EDUCATION/TRAINING PROGRAM

## 2024-04-05 PROCEDURE — 93005 ELECTROCARDIOGRAM TRACING: CPT | Performed by: STUDENT IN AN ORGANIZED HEALTH CARE EDUCATION/TRAINING PROGRAM

## 2024-04-05 PROCEDURE — 83690 ASSAY OF LIPASE: CPT | Performed by: STUDENT IN AN ORGANIZED HEALTH CARE EDUCATION/TRAINING PROGRAM

## 2024-04-05 PROCEDURE — 93010 ELECTROCARDIOGRAM REPORT: CPT | Performed by: INTERNAL MEDICINE

## 2024-04-05 PROCEDURE — 85025 COMPLETE CBC W/AUTO DIFF WBC: CPT | Performed by: STUDENT IN AN ORGANIZED HEALTH CARE EDUCATION/TRAINING PROGRAM

## 2024-04-05 PROCEDURE — 36415 COLL VENOUS BLD VENIPUNCTURE: CPT | Performed by: STUDENT IN AN ORGANIZED HEALTH CARE EDUCATION/TRAINING PROGRAM

## 2024-04-05 PROCEDURE — 99283 EMERGENCY DEPT VISIT LOW MDM: CPT | Performed by: STUDENT IN AN ORGANIZED HEALTH CARE EDUCATION/TRAINING PROGRAM

## 2024-04-05 PROCEDURE — 99291 CRITICAL CARE FIRST HOUR: CPT | Mod: 25 | Performed by: STUDENT IN AN ORGANIZED HEALTH CARE EDUCATION/TRAINING PROGRAM

## 2024-04-05 PROCEDURE — 84443 ASSAY THYROID STIM HORMONE: CPT | Performed by: STUDENT IN AN ORGANIZED HEALTH CARE EDUCATION/TRAINING PROGRAM

## 2024-04-05 PROCEDURE — 80053 COMPREHEN METABOLIC PANEL: CPT | Performed by: STUDENT IN AN ORGANIZED HEALTH CARE EDUCATION/TRAINING PROGRAM

## 2024-04-05 RX ORDER — ONDANSETRON 4 MG/1
4 TABLET, ORALLY DISINTEGRATING ORAL ONCE
Status: COMPLETED | OUTPATIENT
Start: 2024-04-05 | End: 2024-04-06

## 2024-04-05 RX ORDER — FAMOTIDINE 20 MG/1
20 TABLET, FILM COATED ORAL ONCE
Status: COMPLETED | OUTPATIENT
Start: 2024-04-05 | End: 2024-04-06

## 2024-04-05 RX ORDER — MAGNESIUM HYDROXIDE/ALUMINUM HYDROXICE/SIMETHICONE 120; 1200; 1200 MG/30ML; MG/30ML; MG/30ML
15 SUSPENSION ORAL ONCE
Status: COMPLETED | OUTPATIENT
Start: 2024-04-05 | End: 2024-04-06

## 2024-04-05 ASSESSMENT — COLUMBIA-SUICIDE SEVERITY RATING SCALE - C-SSRS
2. HAVE YOU ACTUALLY HAD ANY THOUGHTS OF KILLING YOURSELF IN THE PAST MONTH?: NO
6. HAVE YOU EVER DONE ANYTHING, STARTED TO DO ANYTHING, OR PREPARED TO DO ANYTHING TO END YOUR LIFE?: NO
1. IN THE PAST MONTH, HAVE YOU WISHED YOU WERE DEAD OR WISHED YOU COULD GO TO SLEEP AND NOT WAKE UP?: NO

## 2024-04-06 ENCOUNTER — APPOINTMENT (OUTPATIENT)
Dept: ULTRASOUND IMAGING | Facility: OTHER | Age: 39
End: 2024-04-06
Attending: STUDENT IN AN ORGANIZED HEALTH CARE EDUCATION/TRAINING PROGRAM
Payer: COMMERCIAL

## 2024-04-06 ENCOUNTER — APPOINTMENT (OUTPATIENT)
Dept: GENERAL RADIOLOGY | Facility: OTHER | Age: 39
End: 2024-04-06
Attending: STUDENT IN AN ORGANIZED HEALTH CARE EDUCATION/TRAINING PROGRAM
Payer: COMMERCIAL

## 2024-04-06 VITALS
WEIGHT: 220 LBS | DIASTOLIC BLOOD PRESSURE: 94 MMHG | HEIGHT: 64 IN | RESPIRATION RATE: 12 BRPM | BODY MASS INDEX: 37.56 KG/M2 | SYSTOLIC BLOOD PRESSURE: 126 MMHG | TEMPERATURE: 98.8 F | HEART RATE: 75 BPM | OXYGEN SATURATION: 98 %

## 2024-04-06 LAB
ALBUMIN SERPL BCG-MCNC: 5 G/DL (ref 3.5–5.2)
ALP SERPL-CCNC: 69 U/L (ref 40–150)
ALT SERPL W P-5'-P-CCNC: 41 U/L (ref 0–50)
ANION GAP SERPL CALCULATED.3IONS-SCNC: 12 MMOL/L (ref 7–15)
AST SERPL W P-5'-P-CCNC: 25 U/L (ref 0–45)
BASOPHILS # BLD AUTO: 0.1 10E3/UL (ref 0–0.2)
BASOPHILS NFR BLD AUTO: 1 %
BILIRUB SERPL-MCNC: 0.4 MG/DL
BUN SERPL-MCNC: 10.6 MG/DL (ref 6–20)
CALCIUM SERPL-MCNC: 10.5 MG/DL (ref 8.6–10)
CHLORIDE SERPL-SCNC: 102 MMOL/L (ref 98–107)
CREAT SERPL-MCNC: 0.76 MG/DL (ref 0.51–0.95)
DEPRECATED HCO3 PLAS-SCNC: 25 MMOL/L (ref 22–29)
EGFRCR SERPLBLD CKD-EPI 2021: >90 ML/MIN/1.73M2
EOSINOPHIL # BLD AUTO: 0.2 10E3/UL (ref 0–0.7)
EOSINOPHIL NFR BLD AUTO: 2 %
ERYTHROCYTE [DISTWIDTH] IN BLOOD BY AUTOMATED COUNT: 12.1 % (ref 10–15)
GLUCOSE SERPL-MCNC: 118 MG/DL (ref 70–99)
HCT VFR BLD AUTO: 42.5 % (ref 35–47)
HGB BLD-MCNC: 14.3 G/DL (ref 11.7–15.7)
HOLD SPECIMEN: NORMAL
IMM GRANULOCYTES # BLD: 0.1 10E3/UL
IMM GRANULOCYTES NFR BLD: 1 %
LIPASE SERPL-CCNC: 30 U/L (ref 13–60)
LYMPHOCYTES # BLD AUTO: 5.3 10E3/UL (ref 0.8–5.3)
LYMPHOCYTES NFR BLD AUTO: 40 %
MCH RBC QN AUTO: 31.2 PG (ref 26.5–33)
MCHC RBC AUTO-ENTMCNC: 33.6 G/DL (ref 31.5–36.5)
MCV RBC AUTO: 93 FL (ref 78–100)
MONOCYTES # BLD AUTO: 0.9 10E3/UL (ref 0–1.3)
MONOCYTES NFR BLD AUTO: 7 %
NEUTROPHILS # BLD AUTO: 6.7 10E3/UL (ref 1.6–8.3)
NEUTROPHILS NFR BLD AUTO: 50 %
NRBC # BLD AUTO: 0 10E3/UL
NRBC BLD AUTO-RTO: 0 /100
PLATELET # BLD AUTO: 354 10E3/UL (ref 150–450)
POTASSIUM SERPL-SCNC: 4 MMOL/L (ref 3.4–5.3)
PROT SERPL-MCNC: 7.7 G/DL (ref 6.4–8.3)
RBC # BLD AUTO: 4.59 10E6/UL (ref 3.8–5.2)
SODIUM SERPL-SCNC: 139 MMOL/L (ref 135–145)
TROPONIN T SERPL HS-MCNC: <6 NG/L
TSH SERPL DL<=0.005 MIU/L-ACNC: 2.56 UIU/ML (ref 0.3–4.2)
WBC # BLD AUTO: 13.3 10E3/UL (ref 4–11)

## 2024-04-06 PROCEDURE — 76700 US EXAM ABDOM COMPLETE: CPT | Mod: TC

## 2024-04-06 PROCEDURE — 250N000011 HC RX IP 250 OP 636: Performed by: STUDENT IN AN ORGANIZED HEALTH CARE EDUCATION/TRAINING PROGRAM

## 2024-04-06 PROCEDURE — 250N000013 HC RX MED GY IP 250 OP 250 PS 637: Performed by: STUDENT IN AN ORGANIZED HEALTH CARE EDUCATION/TRAINING PROGRAM

## 2024-04-06 PROCEDURE — 74018 RADEX ABDOMEN 1 VIEW: CPT | Mod: TC

## 2024-04-06 PROCEDURE — 71045 X-RAY EXAM CHEST 1 VIEW: CPT | Mod: TC

## 2024-04-06 RX ORDER — ONDANSETRON 4 MG/1
4 TABLET, ORALLY DISINTEGRATING ORAL EVERY 8 HOURS PRN
Qty: 12 TABLET | Refills: 0 | Status: SHIPPED | OUTPATIENT
Start: 2024-04-06

## 2024-04-06 RX ADMIN — ONDANSETRON 4 MG: 4 TABLET, ORALLY DISINTEGRATING ORAL at 00:00

## 2024-04-06 RX ADMIN — ALUMINUM HYDROXIDE, MAGNESIUM HYDROXIDE, AND SIMETHICONE 15 ML: 200; 200; 20 SUSPENSION ORAL at 00:01

## 2024-04-06 RX ADMIN — FAMOTIDINE 20 MG: 20 TABLET, FILM COATED ORAL at 00:01

## 2024-04-06 ASSESSMENT — ACTIVITIES OF DAILY LIVING (ADL): ADLS_ACUITY_SCORE: 36

## 2024-04-06 NOTE — ED TRIAGE NOTES
"Patient being evaluated today for C/O midsternal chest pain that radiates to her shoulder, back, and abdomen. She C/O associated nausea, dizziness, and SOB. Patient states this has been off and on for 2-3 days; becoming constant in the last 2-3 hours. BP (!) 167/98   Temp 98.8  F (37.1  C) (Tympanic)   Resp 20   Ht 1.626 m (5' 4\")   Wt 99.8 kg (220 lb)   LMP 03/27/2022   BMI 37.76 kg/m  .     Triage Assessment (Adult)       Row Name 04/05/24 2329          Triage Assessment    Airway WDL WDL        Respiratory WDL    Respiratory WDL X;rhythm/pattern     Rhythm/Pattern, Respiratory shortness of breath        Skin Circulation/Temperature WDL    Skin Circulation/Temperature WDL WDL        Cardiac WDL    Cardiac WDL X;chest pain        Chest Pain Assessment    Chest Pain Location midsternal     Chest Pain Radiation shoulder;abdomen;back     Character aching;burning;stabbing;squeezing     Duration 2 hours     Precipitating Factors at rest     Alleviating Factors nothing     Associated Signs/Symptoms dizziness;dyspnea;nausea     Chest Pain Intervention cardiac monitor placed;12-lead ECG obtained;activity minimized        Peripheral/Neurovascular WDL    Peripheral Neurovascular WDL WDL        Cognitive/Neuro/Behavioral WDL    Cognitive/Neuro/Behavioral WDL WDL                     "

## 2024-04-06 NOTE — ED PROVIDER NOTES
"ED PROVIDER NOTE  Patient Name: Suzi Miller  MRN: [id number]    CC:    Chief Complaint   Patient presents with    Chest Pain     Off and on for 2-3 days, now constant         MDM  38 year old female presenting with chest pain.    In the ED, BP (!) 167/98   Temp 98.8  F (37.1  C) (Tympanic)   Resp 20   Ht 1.626 m (5' 4\")   Wt 99.8 kg (220 lb)   LMP 03/27/2022   BMI 37.76 kg/m      Physical exam revealed clear auscultation bilaterally.  Mild left lower quadrant abdominal tenderness and epigastric abdominal tenderness grossly neurologically intact.  In no acute distress, no accessory muscle use.  Overall does not look critically ill or toxic.      I have independently reviewed and interpreted the patients test results which I have ordered this visit which are the following:  Labs were remarkable for white blood cell count of 13.3.  Hemoglobin 14.3.  Sodium 139.  Potassium 4.0.  Creatinine 0.76.  TSH 2.56.  Lipase 30.  Troponin less than 6.. EKG independently reviewed by me revealed EKG revealed sinus rhythm, no signs of acute ischemia.  Normal sinus rhythm, ventricular rate of 97..    Patient declined any pain medications given a GI cocktail, Pepcid and Zofran.    Ultrasound revealed no cholelithiasis or signs of cholecystitis.  Chest x-ray and abdominal x-ray were unremarkable.  We did discuss advanced imaging however ultimately declined.  She did have improvement after GI cocktail Zofran and famotidine and thus a trial of famotidine and omeprazole outpatient is warranted.  If her symptoms do not significant proved, she may need to be tested for H. pylori or considered for EGD.  All questions were answered, strict return precautions given.  If her symptoms worsen, would consider CT abdomen pelvis if she returns emergency department.      Plan  - EKG independently reviewed by me revealed EKG revealed sinus rhythm, no signs of acute ischemia.  Normal sinus rhythm, ventricular rate of " 97..  -Maalox  -Pepcid  -Zofran  - The patient was advised to follow up with PCP in 2-3 days and to return to the ED if symptoms worsened, or if there were any other urgent or life-threatening concerns.  - Following counseling on the work-up, results, diagnosis, and treatment plan, the patient was amenable to discharge after all questions were answered. The patient expressed agreement and understanding to the plan.      Clinical impression  Abdominal pain    Disposition  Home      HPI    Suzi Miller is a 38 year old female with PMH of endometriosis, Hashimoto's disease, restrictive airway disease who presents with chest pain.     History of obtained by: Patient    Patient states she has had nausea, midsternal chest pain rating to the epigastric region, on and off chest pain that does not change with food.  No fevers or chills.  Symptoms are worse with a deep breath.  She denies any dyspepsia, no back pain.    PMH and SH reviewed.    10 point ROS reviewed and negative except as stated in HPI.    Past medical history:  Past Medical History:   Diagnosis Date    Endometriosis     Hashimoto's thyroiditis        Social history:  Social Connections: Not on file     Social History     Socioeconomic History    Marital status:    Tobacco Use    Smoking status: Never    Smokeless tobacco: Never   Vaping Use    Vaping Use: Never used   Substance and Sexual Activity    Alcohol use: Not Currently     Comment: rare    Drug use: Never    Sexual activity: Yes     Partners: Male     Birth control/protection: Female Surgical     Comment: TLCHUN/FLORENTIN 4/14/22   Social History Narrative        3 boys (2012, 2009, 2005)    Non smoker         I have reviewed the patients prescribed medications:  No current facility-administered medications for this encounter.    Current Outpatient Medications:     albuterol (PROAIR HFA/PROVENTIL HFA/VENTOLIN HFA) 108 (90 Base) MCG/ACT inhaler, Inhale 2 puffs into the lungs every 6 hours as  "needed for shortness of breath / dyspnea or wheezing, Disp: 18 g, Rfl: 0    APAP-Pamabrom-Pyrilamine (PAMPRIN MULTI-SYMPTOM PO), Take by mouth At Bedtime, Disp: , Rfl:     benzonatate (TESSALON) 200 MG capsule, Take 1 capsule (200 mg) by mouth 3 times daily as needed for cough, Disp: 15 capsule, Rfl: 0    EPINEPHrine (ANY BX GENERIC EQUIV) 0.3 MG/0.3ML injection 2-pack, Inject 0.3 mLs (0.3 mg) into the muscle once as needed for anaphylaxis, Disp: 1 each, Rfl: 0    hydrocortisone, Perianal, (HYDROCORTISONE) 2.5 % cream, Place rectally 2 times daily as needed for hemorrhoids, Disp: 30 g, Rfl: 1    ondansetron (ZOFRAN ODT) 4 MG ODT tab, Take 1 tablet (4 mg) by mouth every 6 hours as needed for nausea, Disp: 20 tablet, Rfl: 0    predniSONE (DELTASONE) 20 MG tablet, Take 2 daily for 4 days, then 1 daily for 4 days., Disp: 12 tablet, Rfl: 0    SUMAtriptan (IMITREX) 50 MG tablet, Take 1 tablet (50 mg) by mouth at onset of headache for migraine May repeat in 2 hours. Max 4 tablets/24 hours., Disp: 90 tablet, Rfl: 3    UNABLE TO FIND, Take 4 chew tab by mouth daily Juice Plus - 2 fruit, 2 vegetable, Disp: , Rfl:     UNABLE TO FIND, Take 1 tablet by mouth daily Oh Joint, Disp: , Rfl:     Allergies:  Allergies   Allergen Reactions    Penicillins Anaphylaxis    Reglan [Metoclopramide]      seizure         Vitals:    04/05/24 2327   BP: (!) 167/98   Resp: 20   Temp: 98.8  F (37.1  C)   TempSrc: Tympanic   Weight: 99.8 kg (220 lb)   Height: 1.626 m (5' 4\")       Physical Exam  Vitals: BP (!) 167/98   Temp 98.8  F (37.1  C) (Tympanic)   Resp 20   Ht 1.626 m (5' 4\")   Wt 99.8 kg (220 lb)   LMP 03/27/2022   BMI 37.76 kg/m    Constitutional: awake, NAD  Eyes: No conjunctival injection and normal lids, PERRL, EOMI  ENT: external nose and ears atraumatic  Neck: Symmetric, trachea midline, Supple  CV: RRR, no murmurs appreciated.  Pulm: Unlabored respiratory effort, good air movement, CTAB, no w/c/r appreciated.  GI: Soft, " nontender, nondistended.  No rebound or guarding  MSK: No deformities.  No cyanosis.  Neuro: AOx4, normal speech, following commands, grossly symmetric strength in all extremities.  Cranial nerves III-XII grossly intact.    Skin: warm & dry, no rashes or lesions  Psych: Appropriate mood & affect    Past medical history, past surgical history, problem list, family history, social history, medication list, and allergies were reviewed as documented in epic snapshot.  Relevant review of systems are documented within the HPI above.    Complexity of the Problems Addressed  5 (High) - 1 or more chronic illnesses with severe exacerbation, progression, or side effects of treatment or 1 acute chronic illness or injury that poses threat to live or bodily function    Complexity of Data  I have reviewed the following pertinent historical labs, diagnoses, tests, and/or imaging which contribute to complexity of this patient's care.    5 - (Extensive) - (2 of three above)    Complication Risk  Based on my interpretation of the current labs, historical tests and/or external tests. Patient does have a risk level as stated below of morbidity, threat to life, and bodily function, and severe exacerbation if not treated.   4 - Moderate ( Rx drug management, significant limitation of treatment options 2/2 social determinants of health, decision regarding elective major surgery without risk, decision regarding minor surgery with identified patient risk).  -Rx drug management such as giving new Rx in the ED, new Rx prescibed for home use, modification of Rx Drugs, review of home meds and considering dose adjustment but not dose adjustment made.  - major/ minor surgery discussions (regarding fracture reduction, joint relocation, chest tube, cardioversion, intubation)      ED Events, Labs and Imaging:  ED Course as of 04/06/24 0125   Sat Apr 06, 2024   0006 WBC(!): 13.3   0006 Hemoglobin: 14.3   0006 Platelet Count: 354   0018 Sodium: 139    0018 Potassium: 4.0   0018 Lipase: 30   0018 Troponin T, High Sensitivity: <6   0018 Creatinine: 0.76   0018 Glucose(!): 118   0018 Chloride: 102   0018 Calcium(!): 10.5   0018 ALT: 41   0018 AST: 25   0105 Xr abdomen 1 view    IMPRESSION:   No mechanical bowel obstruction.     0105 Xr chest    IMPRESSION:   No radiographic evidence of active cardiopulmonary disease.         Humphrey Joshi MD  Emergency Medicine    Dictation Disclaimer: Some notes are completed with voice-recognition dictation software. Errors are generally corrected in real time. Please contact me via Epic staff message if you note any errors requiring clarification.     Kevon Joshi MD  04/06/24 0124

## 2024-04-06 NOTE — DISCHARGE INSTRUCTIONS
Follow-up with your primary care doctor the next 2 to 3 days.    Take famotidine 2 times a day for 2 weeks    Take omeprazole for 6 weeks 1 times daily    Use Zofran as needed for nausea and vomiting    For mild to moderate pain or fever you can take ibuprofen and/or Tylenol if you do not have allergies to these.    You have any worsening or concerning symptoms please call 911 or return to the emergency department immediately.

## 2024-04-09 LAB
ATRIAL RATE - MUSE: 97 BPM
DIASTOLIC BLOOD PRESSURE - MUSE: NORMAL MMHG
INTERPRETATION ECG - MUSE: NORMAL
P AXIS - MUSE: 22 DEGREES
PR INTERVAL - MUSE: 148 MS
QRS DURATION - MUSE: 80 MS
QT - MUSE: 366 MS
QTC - MUSE: 464 MS
R AXIS - MUSE: 38 DEGREES
SYSTOLIC BLOOD PRESSURE - MUSE: NORMAL MMHG
T AXIS - MUSE: 30 DEGREES
VENTRICULAR RATE- MUSE: 97 BPM

## 2024-04-10 ENCOUNTER — OFFICE VISIT (OUTPATIENT)
Dept: INTERNAL MEDICINE | Facility: OTHER | Age: 39
End: 2024-04-10
Payer: COMMERCIAL

## 2024-04-10 VITALS
HEIGHT: 65 IN | OXYGEN SATURATION: 94 % | WEIGHT: 222.6 LBS | TEMPERATURE: 98.3 F | RESPIRATION RATE: 17 BRPM | SYSTOLIC BLOOD PRESSURE: 115 MMHG | DIASTOLIC BLOOD PRESSURE: 82 MMHG | BODY MASS INDEX: 37.09 KG/M2 | HEART RATE: 91 BPM

## 2024-04-10 DIAGNOSIS — R19.7 DIARRHEA, UNSPECIFIED TYPE: ICD-10-CM

## 2024-04-10 DIAGNOSIS — Z00.00 ROUTINE GENERAL MEDICAL EXAMINATION AT A HEALTH CARE FACILITY: Primary | ICD-10-CM

## 2024-04-10 DIAGNOSIS — Z11.4 ENCOUNTER FOR SCREENING FOR HIV: ICD-10-CM

## 2024-04-10 DIAGNOSIS — R10.32 ABDOMINAL PAIN, LEFT LOWER QUADRANT: ICD-10-CM

## 2024-04-10 DIAGNOSIS — Z11.59 NEED FOR HEPATITIS C SCREENING TEST: ICD-10-CM

## 2024-04-10 DIAGNOSIS — K21.9 GASTROESOPHAGEAL REFLUX DISEASE, UNSPECIFIED WHETHER ESOPHAGITIS PRESENT: ICD-10-CM

## 2024-04-10 DIAGNOSIS — Z23 NEED FOR HEPATITIS B VACCINATION: ICD-10-CM

## 2024-04-10 LAB
ALBUMIN SERPL BCG-MCNC: 4.8 G/DL (ref 3.5–5.2)
ALP SERPL-CCNC: 77 U/L (ref 40–150)
ALT SERPL W P-5'-P-CCNC: 34 U/L (ref 0–50)
ANION GAP SERPL CALCULATED.3IONS-SCNC: 12 MMOL/L (ref 7–15)
AST SERPL W P-5'-P-CCNC: 23 U/L (ref 0–45)
BASOPHILS # BLD AUTO: 0 10E3/UL (ref 0–0.2)
BASOPHILS NFR BLD AUTO: 0 %
BILIRUB SERPL-MCNC: 0.8 MG/DL
BUN SERPL-MCNC: 12.4 MG/DL (ref 6–20)
CALCIUM SERPL-MCNC: 9.9 MG/DL (ref 8.6–10)
CHLORIDE SERPL-SCNC: 103 MMOL/L (ref 98–107)
CREAT SERPL-MCNC: 0.74 MG/DL (ref 0.51–0.95)
CRP SERPL-MCNC: 9.6 MG/L
DEPRECATED HCO3 PLAS-SCNC: 24 MMOL/L (ref 22–29)
EGFRCR SERPLBLD CKD-EPI 2021: >90 ML/MIN/1.73M2
EOSINOPHIL # BLD AUTO: 0.2 10E3/UL (ref 0–0.7)
EOSINOPHIL NFR BLD AUTO: 2 %
ERYTHROCYTE [DISTWIDTH] IN BLOOD BY AUTOMATED COUNT: 12.1 % (ref 10–15)
ERYTHROCYTE [SEDIMENTATION RATE] IN BLOOD BY WESTERGREN METHOD: 35 MM/HR (ref 0–20)
GLUCOSE SERPL-MCNC: 108 MG/DL (ref 70–99)
HCT VFR BLD AUTO: 44 % (ref 35–47)
HGB BLD-MCNC: 14.8 G/DL (ref 11.7–15.7)
IMM GRANULOCYTES # BLD: 0.1 10E3/UL
IMM GRANULOCYTES NFR BLD: 1 %
LYMPHOCYTES # BLD AUTO: 3.4 10E3/UL (ref 0.8–5.3)
LYMPHOCYTES NFR BLD AUTO: 28 %
MCH RBC QN AUTO: 31.3 PG (ref 26.5–33)
MCHC RBC AUTO-ENTMCNC: 33.6 G/DL (ref 31.5–36.5)
MCV RBC AUTO: 93 FL (ref 78–100)
MONOCYTES # BLD AUTO: 0.8 10E3/UL (ref 0–1.3)
MONOCYTES NFR BLD AUTO: 6 %
NEUTROPHILS # BLD AUTO: 7.8 10E3/UL (ref 1.6–8.3)
NEUTROPHILS NFR BLD AUTO: 63 %
NRBC # BLD AUTO: 0 10E3/UL
NRBC BLD AUTO-RTO: 0 /100
PLATELET # BLD AUTO: 347 10E3/UL (ref 150–450)
POTASSIUM SERPL-SCNC: 4.4 MMOL/L (ref 3.4–5.3)
PROT SERPL-MCNC: 7.8 G/DL (ref 6.4–8.3)
RBC # BLD AUTO: 4.73 10E6/UL (ref 3.8–5.2)
SODIUM SERPL-SCNC: 139 MMOL/L (ref 135–145)
WBC # BLD AUTO: 12.3 10E3/UL (ref 4–11)
WBC # BLD AUTO: 12.6 10E3/UL (ref 4–11)

## 2024-04-10 PROCEDURE — 86140 C-REACTIVE PROTEIN: CPT | Mod: ZL

## 2024-04-10 PROCEDURE — 86364 TISS TRNSGLTMNASE EA IG CLAS: CPT | Mod: ZL

## 2024-04-10 PROCEDURE — 87389 HIV-1 AG W/HIV-1&-2 AB AG IA: CPT | Mod: ZL

## 2024-04-10 PROCEDURE — 90746 HEPB VACCINE 3 DOSE ADULT IM: CPT

## 2024-04-10 PROCEDURE — 99214 OFFICE O/P EST MOD 30 MIN: CPT | Mod: 25

## 2024-04-10 PROCEDURE — 99395 PREV VISIT EST AGE 18-39: CPT | Mod: 25

## 2024-04-10 PROCEDURE — 85652 RBC SED RATE AUTOMATED: CPT | Mod: ZL

## 2024-04-10 PROCEDURE — 36415 COLL VENOUS BLD VENIPUNCTURE: CPT | Mod: ZL

## 2024-04-10 PROCEDURE — 90471 IMMUNIZATION ADMIN: CPT

## 2024-04-10 PROCEDURE — 86803 HEPATITIS C AB TEST: CPT | Mod: ZL

## 2024-04-10 PROCEDURE — 80053 COMPREHEN METABOLIC PANEL: CPT | Mod: ZL

## 2024-04-10 PROCEDURE — 85048 AUTOMATED LEUKOCYTE COUNT: CPT | Mod: ZL

## 2024-04-10 SDOH — HEALTH STABILITY: PHYSICAL HEALTH: ON AVERAGE, HOW MANY DAYS PER WEEK DO YOU ENGAGE IN MODERATE TO STRENUOUS EXERCISE (LIKE A BRISK WALK)?: 2 DAYS

## 2024-04-10 ASSESSMENT — ENCOUNTER SYMPTOMS
HEMATOCHEZIA: 0
DIARRHEA: 1
FLANK PAIN: 0
COUGH: 0
ABDOMINAL PAIN: 1
FEVER: 0
VOMITING: 0
CONSTIPATION: 0
NAUSEA: 1
ANAL BLEEDING: 0
SHORTNESS OF BREATH: 0
DYSURIA: 0
CHILLS: 0

## 2024-04-10 ASSESSMENT — PAIN SCALES - GENERAL: PAINLEVEL: MODERATE PAIN (4)

## 2024-04-10 ASSESSMENT — SOCIAL DETERMINANTS OF HEALTH (SDOH): HOW OFTEN DO YOU GET TOGETHER WITH FRIENDS OR RELATIVES?: ONCE A WEEK

## 2024-04-10 NOTE — NURSING NOTE
"Chief Complaint   Patient presents with    RECHECK     ER follow up for chest pain and abdominal pain    Physical     Patient presents to the clinic today for a Recheck for an ER visit for chest pain and abdominal pain.    Patient states that the chest pain comes and goes and yesterday she experienced it again but she stated it wasn't as bad. Patient states that the abdomen pain is always there and she feels nauseas and has diarrhea.    Initial /82 (BP Location: Right arm, Patient Position: Sitting, Cuff Size: Adult Large)   Pulse 91   Temp 98.3  F (36.8  C) (Temporal)   Resp 17   Ht 1.651 m (5' 5\")   Wt 101 kg (222 lb 9.6 oz)   LMP 03/27/2022   SpO2 94%   Breastfeeding No   BMI 37.04 kg/m   Estimated body mass index is 37.04 kg/m  as calculated from the following:    Height as of this encounter: 1.651 m (5' 5\").    Weight as of this encounter: 101 kg (222 lb 9.6 oz).  Meds Reconciled: tan Crain  "

## 2024-04-10 NOTE — PROGRESS NOTES
Preventive Care Visit  [facility]  Vonda Mercer, ETHEL CNP, Nurse Practitioner Primary Care  Apr 10, 2024      Assessment & Plan     ICD-10-CM    1. Routine general medical examination at a health care facility  Z00.00       2. Diarrhea, unspecified type  R19.7 Adult GI  Referral - Procedure Only     Helicobacter pylori Antigen Stool     CBC with platelets     Comprehensive metabolic panel     Tissue transglutaminase ade IgA and IgG     Erythrocyte sedimentation rate auto     CRP inflammation     Fat Stool Qual Random Collection     Elastase Fecal     Cryptosporidium/Giardia Immunoassay     Helicobacter pylori Antigen Stool     CBC with platelets     Comprehensive metabolic panel     Tissue transglutaminase ade IgA and IgG     Erythrocyte sedimentation rate auto     CRP inflammation     Fat Stool Qual Random Collection     Elastase Fecal     Cryptosporidium/Giardia Immunoassay      3. Abdominal pain, left lower quadrant  R10.32 Adult GI  Referral - Procedure Only      4. Gastroesophageal reflux disease, unspecified whether esophagitis present  K21.9 Adult GI  Referral - Procedure Only     omeprazole (PRILOSEC) 20 MG DR capsule      5. Encounter for screening for HIV  Z11.4 HIV Antigen Antibody Combo     HIV Antigen Antibody Combo      6. Need for hepatitis C screening test  Z11.59 Hepatitis C Screen Reflex to HCV RNA Quant and Genotype     Hepatitis C Screen Reflex to HCV RNA Quant and Genotype      7. Need for hepatitis B vaccination  Z23 [facility] IMM-  HEPATITIS B VACCINE, ADULT, IM         Epigastric/chest pain/left lower quadrant pain: Instructed patient to start omeprazole 20 mg daily for 6 weeks.  Referral for upper and lower endoscopy has been placed for further evaluation.  Reviewed recent ultrasound imaging which shows fatty metamorphosis of the liver, otherwise no acute abnormalities, abdominal x-ray does not show bowel obstruction.  CMP is normal, she does have  "slight leukocytosis which is trending down from 5 days ago-WBC at 12.6.  CRP and ESR are slightly elevated.  Stool panel for chronic diarrhea has been placed/H. pylori stool sample.  Instructed patient to present to emergency room if she develops any new or worsening symptoms, otherwise we will follow-up after her upper and lower endoscopies.             BMI  Estimated body mass index is 37.04 kg/m  as calculated from the following:    Height as of this encounter: 1.651 m (5' 5\").    Weight as of this encounter: 101 kg (222 lb 9.6 oz).       Counseling  Appropriate preventive services were discussed with this patient, including applicable screening as appropriate for fall prevention, nutrition, physical activity, Tobacco-use cessation, weight loss and cognition.  Checklist reviewing preventive services available has been given to the patient.  Reviewed patient's diet, addressing concerns and/or questions.   She is at risk for lack of exercise and has been provided with information to increase physical activity for the benefit of her well-being.         Return in about 53 weeks (around 4/16/2025) for Annual Wellness Visit.      ETHEL Osborne [facility]    Review of Systems   Constitutional:  Negative for chills and fever.   Respiratory:  Negative for cough and shortness of breath.    Cardiovascular:  Positive for chest pain (mid chest/epigastric).   Gastrointestinal:  Positive for abdominal pain, diarrhea (loose) and nausea. Negative for anal bleeding, constipation, hematochezia and vomiting.   Genitourinary:  Negative for dysuria, flank pain, urgency and vaginal discharge.         Kristina Archer is a 38 year old, presenting for the following:    Patient presents to clinic for ER follow-up.  She has a longstanding history of chronic heartburn, states that she developed chest pain on 4/5/2024 reporting that this was a different type of pain.  She had a thorough ER workup which " was mostly unrevealing.  She continues to feel occasional chest pressure and burning that will radiate into her left shoulder.  She also has had ongoing left lower abdominal pain and chronic nausea without vomiting that has been persistent for many months.  Despite having a hysterectomy for endometriosis she continues to have ongoing left lower abdominal pain although this has somewhat improved since her surgery.  The pain is worse with activity and radiates up her left side.  She continues to chronic diarrhea, denies any blood in her stools.  She did have an episode of severe constipation at one point as well.  She takes Tums occasionally for her symptoms, does not take a daily PPI or H2 receptor antagonist.      RECHECK (ER follow up for chest pain and abdominal pain) and Physical      Health Care Directive  Patient does not have a Health Care Directive or Living Will: Discussed advance care planning with patient; information given to patient to review.                4/10/2024   General Health   How would you rate your overall physical health? (!) FAIR   Feel stress (tense, anxious, or unable to sleep) To some extent   (!) STRESS CONCERN      4/10/2024   Nutrition   Three or more servings of calcium each day? (!) NO   Diet: Regular (no restrictions)   How many servings of fruit and vegetables per day? (!) 2-3   How many sweetened beverages each day? (!) 2         4/10/2024   Exercise   Days per week of moderate/strenous exercise 2 days   (!) EXERCISE CONCERN      4/10/2024   Social Factors   Frequency of gathering with friends or relatives Once a week   Worry food won't last until get money to buy more No   Food not last or not have enough money for food? No   Do you have housing?  Yes   Are you worried about losing your housing? No   Lack of transportation? No   Unable to get utilities (heat,electricity)? No         4/10/2024   Dental   Dentist two times every year? Yes         4/10/2024   TB Screening   Were  "you born outside of the [place]? No         Today's PHQ-2 Score:       4/10/2024    10:26 AM   PHQ-2 ( 1999 [employer])   Q1: Little interest or pleasure in doing things 1   Q2: Feeling down, depressed or hopeless 0   PHQ-2 Score 1   Q1: Little interest or pleasure in doing things Several days   Q2: Feeling down, depressed or hopeless Not at all   PHQ-2 Score 1           4/10/2024   Substance Use   Alcohol more than 3/day or more than 7/wk No   Do you use any other substances recreationally? No     Social History     Tobacco Use    Smoking status: Never    Smokeless tobacco: Never   Vaping Use    Vaping status: Never Used   Substance Use Topics    Alcohol use: Yes     Comment: occasionally    Drug use: Never                    4/10/2024   One time HIV Screening   Previous HIV test? Yes         4/10/2024   STI Screening   New sexual partner(s) since last STI/HIV test? No     History of abnormal Pap smear: Last 3 Pap Results:   PAP (no units)   Date Value   02/04/2021 NIL           Latest Ref Rng & Units 2/4/2021    12:47 PM 2/4/2021    11:22 AM   PAP / HPV   PAP (Historical)   NIL    HPV 16 DNA NEG^Negative Negative     HPV 18 DNA NEG^Negative Negative     Other HR HPV NEG^Negative Negative            Reviewed and updated as needed this visit by Provider                             Objective    Exam  /82 (BP Location: Right arm, Patient Position: Sitting, Cuff Size: Adult Large)   Pulse 91   Temp 98.3  F (36.8  C) (Temporal)   Resp 17   Ht 1.651 m (5' 5\")   Wt 101 kg (222 lb 9.6 oz)   LMP 03/27/2022   SpO2 94%   Breastfeeding No   BMI 37.04 kg/m     Estimated body mass index is 37.04 kg/m  as calculated from the following:    Height as of this encounter: 1.651 m (5' 5\").    Weight as of this encounter: 101 kg (222 lb 9.6 oz).    Physical Exam  Vitals reviewed.   Constitutional:       General: She is not in acute distress.     Appearance: She is well-developed. She is obese.   HENT:      Head: Normocephalic and " atraumatic.      Nose: Nose normal.      Mouth/Throat:      Pharynx: No oropharyngeal exudate.   Eyes:      General: No scleral icterus.     Conjunctiva/sclera: Conjunctivae normal.      Pupils: Pupils are equal, round, and reactive to light.   Neck:      Thyroid: No thyromegaly.   Cardiovascular:      Rate and Rhythm: Normal rate and regular rhythm.      Heart sounds: No murmur heard.  Pulmonary:      Effort: Pulmonary effort is normal. No respiratory distress.      Breath sounds: Normal breath sounds. No wheezing.   Abdominal:      Comments: LLQ pain to deep palpation   Musculoskeletal:         General: No tenderness or deformity. Normal range of motion.      Cervical back: Normal range of motion and neck supple.   Skin:     General: Skin is warm and dry.      Findings: No rash.   Neurological:      Mental Status: She is alert and oriented to person, place, and time.      Cranial Nerves: No cranial nerve deficit.      Coordination: Coordination normal.   Psychiatric:         Behavior: Behavior normal.         Thought Content: Thought content normal.         Judgment: Judgment normal.       Results for orders placed or performed in visit on 04/10/24   CBC with platelets     Status: Abnormal   Result Value Ref Range    WBC Count 12.6 (H) 4.0 - 11.0 10e3/uL    RBC Count 4.73 3.80 - 5.20 10e6/uL    Hemoglobin 14.8 11.7 - 15.7 g/dL    Hematocrit 44.0 35.0 - 47.0 %    MCV 93 78 - 100 fL    MCH 31.3 26.5 - 33.0 pg    MCHC 33.6 31.5 - 36.5 g/dL    RDW 12.1 10.0 - 15.0 %    Platelet Count 347 150 - 450 10e3/uL   Comprehensive metabolic panel     Status: Abnormal   Result Value Ref Range    Sodium 139 135 - 145 mmol/L    Potassium 4.4 3.4 - 5.3 mmol/L    Carbon Dioxide (CO2) 24 22 - 29 mmol/L    Anion Gap 12 7 - 15 mmol/L    Urea Nitrogen 12.4 6.0 - 20.0 mg/dL    Creatinine 0.74 0.51 - 0.95 mg/dL    GFR Estimate >90 >60 mL/min/1.73m2    Calcium 9.9 8.6 - 10.0 mg/dL    Chloride 103 98 - 107 mmol/L    Glucose 108 (H) 70 - 99  mg/dL    Alkaline Phosphatase 77 40 - 150 U/L    AST 23 0 - 45 U/L    ALT 34 0 - 50 U/L    Protein Total 7.8 6.4 - 8.3 g/dL    Albumin 4.8 3.5 - 5.2 g/dL    Bilirubin Total 0.8 <=1.2 mg/dL   Erythrocyte sedimentation rate auto     Status: Abnormal   Result Value Ref Range    Erythrocyte Sedimentation Rate 35 (H) 0 - 20 mm/hr   CRP inflammation     Status: Abnormal   Result Value Ref Range    CRP Inflammation 9.60 (H) <5.00 mg/L               Signed Electronically by: ETHEL Osborne CNP

## 2024-04-10 NOTE — PROGRESS NOTES
{PROVIDER CHARTING PREFERENCE:984922}    Kristina Archer is a 38 year old, presenting for the following health issues:  RECHECK (ER follow up for chest pain )      4/10/2024    10:24 AM   Additional Questions   Roomed by Nelly SORENSEN     HPI     {MA/LPN/RN Pre-Provider Visit Orders- hCG/UA/Strep (Optional):368958}  ED/UC Followup:    Facility:  [facility]  Date of visit: 04/05/24  Reason for visit: Chest pain   Current Status: ***  {additonal problems for provider to add (Optional):199455}    {ROS Picklists (Optional):769320}      Objective    LMP 03/27/2022   There is no height or weight on file to calculate BMI.  Physical Exam   {Exam List (Optional):753299}    {Diagnostic Test Results (Optional):895768}        Signed Electronically by: ETHEL Osborne CNP  {Email feedback regarding this note to [email or web address]   :314042}

## 2024-04-10 NOTE — PATIENT INSTRUCTIONS
Preventive Care Advice   This is general advice given by our system to help you stay healthy. However, your care team may have specific advice just for you. Please talk to your care team about your preventive care needs.  Nutrition  Eat 5 or more servings of fruits and vegetables each day.  Try wheat bread, brown rice and whole grain pasta (instead of white bread, rice, and pasta).  Get enough calcium and vitamin D. Check the label on foods and aim for 100% of the RDA (recommended daily allowance).  Lifestyle  Exercise at least 150 minutes each week   (30 minutes a day, 5 days a week).  Do muscle strengthening activities 2 days a week. These help control your weight and prevent disease.  No smoking.  Wear sunscreen to prevent skin cancer.  Have a dental exam and cleaning every 6 months.  Yearly exams  See your health care team every year to talk about:  Any changes in your health.  Any medicines your care team has prescribed.  Preventive care, family planning, and ways to prevent chronic diseases.  Shots (vaccines)   HPV shots (up to age 26), if you've never had them before.  Hepatitis B shots (up to age 59), if you've never had them before.  COVID-19 shot: Get this shot when it's due.  Flu shot: Get a flu shot every year.  Tetanus shot: Get a tetanus shot every 10 years.  Pneumococcal, hepatitis A, and RSV shots: Ask your care team if you need these based on your risk.  Shingles shot (for age 50 and up).  General health tests  Diabetes screening:  Starting at age 35, Get screened for diabetes at least every 3 years.  If you are younger than age 35, ask your care team if you should be screened for diabetes.  Cholesterol test: At age 39, start having a cholesterol test every 5 years, or more often if advised.  Bone density scan (DEXA): At age 50, ask your care team if you should have this scan for osteoporosis (brittle bones).  Hepatitis C: Get tested at least once in your life.  STIs (sexually transmitted  infections)  Before age 24: Ask your care team if you should be screened for STIs.  After age 24: Get screened for STIs if you're at risk. You are at risk for STIs (including HIV) if:  You are sexually active with more than one person.  You don't use condoms every time.  You or a partner was diagnosed with a sexually transmitted infection.  If you are at risk for HIV, ask about PrEP medicine to prevent HIV.  Get tested for HIV at least once in your life, whether you are at risk for HIV or not.  Cancer screening tests  Cervical cancer screening: If you have a cervix, begin getting regular cervical cancer screening tests at age 21. Most people who have regular screenings with normal results can stop after age 65. Talk about this with your provider.  Breast cancer scan (mammogram): If you've ever had breasts, begin having regular mammograms starting at age 40. This is a scan to check for breast cancer.  Colon cancer screening: It is important to start screening for colon cancer at age 45.  Have a colonoscopy test every 10 years (or more often if you're at risk) Or, ask your provider about stool tests like a FIT test every year or Cologuard test every 3 years.  To learn more about your testing options, visit: https://www.[facility]/036484.pdf.  For help making a decision, visit: https://bit.ly/lx20948.  Prostate cancer screening test: If you have a prostate and are age 55 to 69, ask your provider if you would benefit from a yearly prostate cancer screening test.  Lung cancer screening: If you are a current or former smoker age 50 to 80, ask your care team if ongoing lung cancer screenings are right for you.  For informational purposes only. Not to replace the advice of your health care provider. Copyright   2023 [employer]. All rights reserved. Clinically reviewed by the [facility] Transitions Program. [employer] 369878 - REV 01/24.    Learning About Stress  What is stress?     Stress is your  body's response to a hard situation. Your body can have a physical, emotional, or mental response. Stress is a fact of life for most people, and it affects everyone differently. What causes stress for you may not be stressful for someone else.  A lot of things can cause stress. You may feel stress when you go on a job interview, take a test, or run a race. This kind of short-term stress is normal and even useful. It can help you if you need to work hard or react quickly. For example, stress can help you finish an important job on time.  Long-term stress is caused by ongoing stressful situations or events. Examples of long-term stress include long-term health problems, ongoing problems at work, or conflicts in your family. Long-term stress can harm your health.  How does stress affect your health?  When you are stressed, your body responds as though you are in danger. It makes hormones that speed up your heart, make you breathe faster, and give you a burst of energy. This is called the fight-or-flight stress response. If the stress is over quickly, your body goes back to normal and no harm is done.  But if stress happens too often or lasts too long, it can have bad effects. Long-term stress can make you more likely to get sick, and it can make symptoms of some diseases worse. If you tense up when you are stressed, you may develop neck, shoulder, or low back pain. Stress is linked to high blood pressure and heart disease.  Stress also harms your emotional health. It can make you tapia, tense, or depressed. Your relationships may suffer, and you may not do well at work or school.  What can you do to manage stress?  You can try these things to help manage stress:   Do something active. Exercise or activity can help reduce stress. Walking is a great way to get started. Even everyday activities such as housecleaning or yard work can help.  Try yoga or darci chi. These techniques combine exercise and meditation. You may need  some training at first to learn them.  Do something you enjoy. For example, listen to music or go to a movie. Practice your hobby or do volunteer work.  Meditate. This can help you relax, because you are not worrying about what happened before or what may happen in the future.  Do guided imagery. Imagine yourself in any setting that helps you feel calm. You can use online videos, books, or a teacher to guide you.  Do breathing exercises. For example:  From a standing position, bend forward from the waist with your knees slightly bent. Let your arms dangle close to the floor.  Breathe in slowly and deeply as you return to a standing position. Roll up slowly and lift your head last.  Hold your breath for just a few seconds in the standing position.  Breathe out slowly and bend forward from the waist.  Let your feelings out. Talk, laugh, cry, and express anger when you need to. Talking with supportive friends or family, a counselor, or a johanna leader about your feelings is a healthy way to relieve stress. Avoid discussing your feelings with people who make you feel worse.  Write. It may help to write about things that are bothering you. This helps you find out how much stress you feel and what is causing it. When you know this, you can find better ways to cope.  What can you do to prevent stress?  You might try some of these things to help prevent stress:  Manage your time. This helps you find time to do the things you want and need to do.  Get enough sleep. Your body recovers from the stresses of the day while you are sleeping.  Get support. Your family, friends, and community can make a difference in how you experience stress.  Limit your news feed. Avoid or limit time on social media or news that may make you feel stressed.  Do something active. Exercise or activity can help reduce stress. Walking is a great way to get started.  Where can you learn more?  Go to https://www.healthwise.net/patiented  Enter N032 in the  "search box to learn more about \"Learning About Stress.\"  Current as of: October 24, 2023               Content Version: 14.0    0331-4793 [employer].   Care instructions adapted under license by your healthcare professional. If you have questions about a medical condition or this instruction, always ask your healthcare professional. [employer] disclaims any warranty or liability for your use of this information.      "

## 2024-04-11 LAB
HCV AB SERPL QL IA: NONREACTIVE
HIV 1+2 AB+HIV1 P24 AG SERPL QL IA: NONREACTIVE

## 2024-04-12 ENCOUNTER — APPOINTMENT (OUTPATIENT)
Dept: LAB | Facility: OTHER | Age: 39
End: 2024-04-12
Payer: COMMERCIAL

## 2024-04-12 LAB — H PYLORI AG STL QL IA: NEGATIVE

## 2024-04-12 PROCEDURE — 87338 HPYLORI STOOL AG IA: CPT | Mod: ZL

## 2024-04-12 PROCEDURE — 87329 GIARDIA AG IA: CPT | Mod: ZL

## 2024-04-12 PROCEDURE — 82653 EL-1 FECAL QUANTITATIVE: CPT | Mod: ZL

## 2024-04-12 PROCEDURE — 82705 FATS/LIPIDS FECES QUAL: CPT | Mod: ZL

## 2024-04-14 LAB
FAT STL QL: NORMAL
NEUTRAL FAT STL QL: NORMAL

## 2024-04-15 LAB
C PARVUM AG STL QL IA: NEGATIVE
G LAMBLIA AG STL QL IA: NEGATIVE
TTG IGA SER-ACNC: 0.5 U/ML
TTG IGG SER-ACNC: <0.6 U/ML

## 2024-04-16 ENCOUNTER — TELEPHONE (OUTPATIENT)
Dept: SURGERY | Facility: OTHER | Age: 39
End: 2024-04-16
Payer: COMMERCIAL

## 2024-04-16 NOTE — TELEPHONE ENCOUNTER
[facility] Diagnostic Referral    Patient has a referral for a C-Scope and EGD with a diagnosis of Gastroesophageal reflux disease, unspecified whether esophagitis present, Diarrhea, unspecified type, and Abdominal pain, left lower quadrant     Please advise.    Thank you,  Adrienne Sims on 4/16/2024 at 8:31 AM

## 2024-04-17 DIAGNOSIS — Z12.11 COLON CANCER SCREENING: Primary | ICD-10-CM

## 2024-04-17 LAB — ELASTASE PANC STL-MCNT: >800 UG/G

## 2024-04-17 RX ORDER — POLYETHYLENE GLYCOL 3350, SODIUM CHLORIDE, SODIUM BICARBONATE, POTASSIUM CHLORIDE 420; 11.2; 5.72; 1.48 G/4L; G/4L; G/4L; G/4L
4000 POWDER, FOR SOLUTION ORAL ONCE
Qty: 4000 ML | Refills: 0 | Status: SHIPPED | OUTPATIENT
Start: 2024-06-07 | End: 2024-06-07

## 2024-04-17 RX ORDER — BISACODYL 5 MG/1
TABLET, DELAYED RELEASE ORAL
Qty: 2 TABLET | Refills: 0 | Status: ON HOLD | OUTPATIENT
Start: 2024-06-07 | End: 2024-07-09

## 2024-04-17 NOTE — TELEPHONE ENCOUNTER
Screening Questions for the Scheduling of Screening Colonoscopies   (If Colonoscopy is diagnostic, Provider should review the chart before scheduling.)  Are you younger than 45 or older than 80?  YES  Do you take aspirin or fish oil?  NO (if yes, tell patient to stop 1 week prior to Colonoscopy)  Do you take warfarin (Coumadin), clopidogrel (Plavix), apixaban (Eliquis), dabigatram (Pradaxa), rivaroxaban (Xarelto) or any blood thinner? NO  Do you take Ozempic? NO  Do you use oxygen or a CPAP at home?  NO  Do you have kidney disease? NO  Are you on dialysis? NO  Have you had a stroke or heart attack in the last year? NO  Have you had a stent in your heart or any blood vessel in the last year? NO  Have you had a transplant of any organ? NO  Have you had a colonoscopy or upper endoscopy (EGD) before? NO  Date of scheduled EGD and Colonoscopy. 06/14/2024  Provider [facility]

## 2024-07-02 RX ORDER — LANOLIN ALCOHOL/MO/W.PET/CERES
3 CREAM (GRAM) TOPICAL
COMMUNITY

## 2024-07-09 ENCOUNTER — ANESTHESIA EVENT (OUTPATIENT)
Dept: SURGERY | Facility: OTHER | Age: 39
End: 2024-07-09
Payer: COMMERCIAL

## 2024-07-09 ENCOUNTER — ANESTHESIA (OUTPATIENT)
Dept: SURGERY | Facility: OTHER | Age: 39
End: 2024-07-09
Payer: COMMERCIAL

## 2024-07-09 ENCOUNTER — HOSPITAL ENCOUNTER (OUTPATIENT)
Facility: OTHER | Age: 39
Discharge: HOME | End: 2024-07-09
Attending: SURGERY | Admitting: SURGERY
Payer: COMMERCIAL

## 2024-07-09 VITALS
HEART RATE: 93 BPM | BODY MASS INDEX: 36.99 KG/M2 | RESPIRATION RATE: 14 BRPM | DIASTOLIC BLOOD PRESSURE: 92 MMHG | TEMPERATURE: 97.8 F | HEIGHT: 65 IN | SYSTOLIC BLOOD PRESSURE: 131 MMHG | WEIGHT: 222 LBS | OXYGEN SATURATION: 99 %

## 2024-07-09 PROCEDURE — 999N000010 HC STATISTIC ANES STAT CODE-CRNA PER MINUTE: Performed by: SURGERY

## 2024-07-09 PROCEDURE — 258N000003 HC RX IP 258 OP 636: Performed by: SURGERY

## 2024-07-09 PROCEDURE — 45378 DIAGNOSTIC COLONOSCOPY: CPT | Performed by: SURGERY

## 2024-07-09 PROCEDURE — 45380 COLONOSCOPY AND BIOPSY: CPT | Performed by: SURGERY

## 2024-07-09 PROCEDURE — 43239 EGD BIOPSY SINGLE/MULTIPLE: CPT | Performed by: NURSE ANESTHETIST, CERTIFIED REGISTERED

## 2024-07-09 PROCEDURE — 250N000011 HC RX IP 250 OP 636: Performed by: NURSE ANESTHETIST, CERTIFIED REGISTERED

## 2024-07-09 PROCEDURE — 43239 EGD BIOPSY SINGLE/MULTIPLE: CPT | Performed by: SURGERY

## 2024-07-09 PROCEDURE — 250N000009 HC RX 250: Performed by: NURSE ANESTHETIST, CERTIFIED REGISTERED

## 2024-07-09 PROCEDURE — 88305 TISSUE EXAM BY PATHOLOGIST: CPT

## 2024-07-09 RX ORDER — PROPOFOL 10 MG/ML
INJECTION, EMULSION INTRAVENOUS CONTINUOUS PRN
Status: DISCONTINUED | OUTPATIENT
Start: 2024-07-09 | End: 2024-07-09

## 2024-07-09 RX ORDER — NALOXONE HYDROCHLORIDE 0.4 MG/ML
0.2 INJECTION, SOLUTION INTRAMUSCULAR; INTRAVENOUS; SUBCUTANEOUS
Status: DISCONTINUED | OUTPATIENT
Start: 2024-07-09 | End: 2024-07-09 | Stop reason: HOSPADM

## 2024-07-09 RX ORDER — SODIUM CHLORIDE, SODIUM LACTATE, POTASSIUM CHLORIDE, CALCIUM CHLORIDE 600; 310; 30; 20 MG/100ML; MG/100ML; MG/100ML; MG/100ML
INJECTION, SOLUTION INTRAVENOUS CONTINUOUS
Status: DISCONTINUED | OUTPATIENT
Start: 2024-07-09 | End: 2024-07-09 | Stop reason: HOSPADM

## 2024-07-09 RX ORDER — LIDOCAINE 40 MG/G
CREAM TOPICAL
Status: DISCONTINUED | OUTPATIENT
Start: 2024-07-09 | End: 2024-07-09 | Stop reason: HOSPADM

## 2024-07-09 RX ORDER — LIDOCAINE HYDROCHLORIDE 20 MG/ML
INJECTION, SOLUTION INFILTRATION; PERINEURAL PRN
Status: DISCONTINUED | OUTPATIENT
Start: 2024-07-09 | End: 2024-07-09

## 2024-07-09 RX ORDER — NALOXONE HYDROCHLORIDE 0.4 MG/ML
0.4 INJECTION, SOLUTION INTRAMUSCULAR; INTRAVENOUS; SUBCUTANEOUS
Status: DISCONTINUED | OUTPATIENT
Start: 2024-07-09 | End: 2024-07-09 | Stop reason: HOSPADM

## 2024-07-09 RX ORDER — ONDANSETRON 2 MG/ML
4 INJECTION INTRAMUSCULAR; INTRAVENOUS EVERY 6 HOURS PRN
Status: DISCONTINUED | OUTPATIENT
Start: 2024-07-09 | End: 2024-07-09 | Stop reason: HOSPADM

## 2024-07-09 RX ORDER — PROPOFOL 10 MG/ML
INJECTION, EMULSION INTRAVENOUS PRN
Status: DISCONTINUED | OUTPATIENT
Start: 2024-07-09 | End: 2024-07-09

## 2024-07-09 RX ORDER — FLUMAZENIL 0.1 MG/ML
0.2 INJECTION, SOLUTION INTRAVENOUS
Status: DISCONTINUED | OUTPATIENT
Start: 2024-07-09 | End: 2024-07-09 | Stop reason: HOSPADM

## 2024-07-09 RX ADMIN — SODIUM CHLORIDE, POTASSIUM CHLORIDE, SODIUM LACTATE AND CALCIUM CHLORIDE 30 ML/HR: 600; 310; 30; 20 INJECTION, SOLUTION INTRAVENOUS at 10:31

## 2024-07-09 RX ADMIN — LIDOCAINE HYDROCHLORIDE 40 MG: 20 INJECTION, SOLUTION INFILTRATION; PERINEURAL at 10:41

## 2024-07-09 RX ADMIN — PROPOFOL 150 MCG/KG/MIN: 10 INJECTION, EMULSION INTRAVENOUS at 10:41

## 2024-07-09 RX ADMIN — ONDANSETRON 4 MG: 2 INJECTION INTRAMUSCULAR; INTRAVENOUS at 11:42

## 2024-07-09 RX ADMIN — PROPOFOL 100 MG: 10 INJECTION, EMULSION INTRAVENOUS at 10:41

## 2024-07-09 ASSESSMENT — ACTIVITIES OF DAILY LIVING (ADL)
ADLS_ACUITY_SCORE: 35

## 2024-07-09 NOTE — OP NOTE
ENDOSCOPY PROCEDURE NOTE    DATE OF SERVICE: 7/9/2024    SURGEON: STEPHANIE Rivers MD     PRE-OP DIAGNOSIS:    GERD  Abdominal pain   Change in bowel habits, chronic diarrhea    POST-OP DIAGNOSIS:    Normal upper endoscopy  Normal colonoscopy     PROCEDURE:   EGD with biopsy  Colonoscopy with random biopsies    ASSISTANT:  Circulator: Radha Antony RN  Relief Circulator: Gwendolyn Smith RN  Scrub Person: CarlozTaras garduno    ANESTHESIA:  MAC                            MACCRNA Independent: Viviana Soliman APRN CRNA    INDICATION FOR THE PROCEDURE: The patient is a 39 year old female with chronic GERD, abdominal pain and change in bowel habits. T    PROCEDURE:   The patient was taken to the endoscopy suite. Appropriate monitors were attached. The patient was placed in the left lateral decubitus position. Bite block was positioned. Timeout was performed and everyone was in agreement to proceed. After appropriate sedation was confirmed, the flexible endoscope was advanced into the oropharynx. The posterior oropharynx appeared grossly normal. The scope was advanced into the proximal esophagus. The esophagus was insufflated with air. The scope was advanced under direct visualization. No acute abnormalities of the esophagus were noted. The scope was advanced into the stomach. The scope was advanced through the pylorus into the duodenal bulb. The bulb and distal duodenum appeared grossly normal.  Biopsies were taken from the duodenum with cold forceps the scope was withdrawn back into the stomach. Antral biopsy was obtained and sent to pathology. The scope was retroflexed and the GE junction inspected. No abnormalities were noted.  The scope was returned to a neutral position and the stomach was decompressed. The scope was withdrawn to the GE junction and biopsy obtained. The mucosa of the esophagus was inspected while withdrawing the scope. No abnormalities were noted.  No evidence of stricture, diverticula or  inflammation in the upper esophagus.  Oropharynx appeared normal.  The scope was withdrawn from the patient. The bite block was removed. The patient tolerated the procedure with no immediately apparent complication.     Rectal exam was performed.  There was normal tone and no palpable masses, moderate external hemorrhoidal tissue noted.  The colonoscope was introduced into the rectum and advanced to the cecum with Mild difficulty.  The patient's prep was excellent.  The terminal cecum was reached.  The ileocecal valve was intubated and the terminal ileum appeared slightly irregular in texture however there is no evidence of inflammation or ulceration.  Biopsies were taken from the terminal ileum with cold forceps.  The cecum, ascending, transverse, descending and sigmoid colon all appeared grossly normal.  Random biopsies were taken from about the colon and rectum with cold forceps.  The scope was retroflexed in the rectum.  The anorectal junction was unremarkable. The scope was straightened and removed.  The patient tolerated the procedure well.     ESTIMATED BLOOD LOSS: none    COMPLICATIONS:  None    TISSUE REMOVED:  Yes    RECOMMEND:    Follow-up pending pathology        STEPHANIE Rivers MD

## 2024-07-09 NOTE — ANESTHESIA POSTPROCEDURE EVALUATION
Patient: Suzi Miller    Procedure: Procedure(s):  Esophagoscopy, gastroscopy, duodenoscopy (EGD), combined WITH BIOPSIES  Colonoscopy WITH BIOPSIES       Anesthesia Type:  MAC    Note:  Disposition: Outpatient   Postop Pain Control: Uneventful            Sign Out: Well controlled pain   PONV: No   Neuro/Psych: Uneventful            Sign Out: Acceptable/Baseline neuro status   Airway/Respiratory: Uneventful            Sign Out: Acceptable/Baseline resp. status   CV/Hemodynamics: Uneventful            Sign Out: Acceptable CV status; No obvious hypovolemia; No obvious fluid overload   Other NRE: NONE   DID A NON-ROUTINE EVENT OCCUR? No       Last vitals:  Vitals Value Taken Time   /92 07/09/24 1200   Temp     Pulse 93 07/09/24 1200   Resp 14 07/09/24 1200   SpO2 98 % 07/09/24 1205   Vitals shown include unfiled device data.    Electronically Signed By: ETHEL Torrez CRNA  July 9, 2024  12:53 PM

## 2024-07-09 NOTE — ANESTHESIA CARE TRANSFER NOTE
Patient: Suzi Miller    Procedure: Procedure(s):  Esophagoscopy, gastroscopy, duodenoscopy (EGD), combined WITH BIOPSIES  Colonoscopy WITH BIOPSIES       Diagnosis: GERD (gastroesophageal reflux disease) [K21.9]  Diagnosis Additional Information: No value filed.    Anesthesia Type:   MAC     Note:    Oropharynx: oropharynx clear of all foreign objects  Level of Consciousness: awake  Oxygen Supplementation: room air    Independent Airway: airway patency satisfactory and stable  Dentition: dentition unchanged  Vital Signs Stable: post-procedure vital signs reviewed and stable  Report to RN Given: handoff report given  Patient transferred to: Phase II    Handoff Report: Identifed the Patient, Identified the Reponsible Provider, Reviewed the pertinent medical history, Discussed the surgical course, Reviewed Intra-OP anesthesia mangement and issues during anesthesia, Set expectations for post-procedure period and Allowed opportunity for questions and acknowledgement of understanding      Vitals:  Vitals Value Taken Time   BP     Temp     Pulse     Resp     SpO2         Electronically Signed By: ETHEL QUINONES CRNA  July 9, 2024  11:15 AM

## 2024-07-09 NOTE — ANESTHESIA PREPROCEDURE EVALUATION
Anesthesia Pre-Procedure Evaluation    Patient: Suzi Miller   MRN: [id number] : 1985        Procedure : Procedure(s):  Esophagoscopy, gastroscopy, duodenoscopy (EGD), combined  Colonoscopy          Past Medical History:   Diagnosis Date     Complication of anesthesia     difficult intubation, slow to wake up     Endometriosis      Hashimoto's thyroiditis      Restrictive airway disease       Past Surgical History:   Procedure Laterality Date     C/SECTION, LOW TRANSVERSE       CLOSED RX BIG TOE FRACTURE      hardware     EXCISE NODULE THYROID       LAPAROSCOPIC HYSTERECTOMY TOTAL, SALPINGECTOMY Bilateral 2022    Procedure: total laparoscopic hysterectomy, bilateral salpingectomy, cystoscopy, left oophorectomy;  Surgeon: Manuela Loaiza MD;  Location:  OR     [place], CERVICAL  2003      Allergies   Allergen Reactions     Penicillins Anaphylaxis     Reglan [Metoclopramide]      seizure      Social History     Tobacco Use     Smoking status: Never     Smokeless tobacco: Never   Substance Use Topics     Alcohol use: Yes     Comment: occasionally      Wt Readings from Last 1 Encounters:   24 100.7 kg (222 lb)        Anesthesia Evaluation   Pt has had prior anesthetic.     History of anesthetic complications  - .  Slow to wake.    ROS/MED HX  ENT/Pulmonary:     (+)                     Intermittent, asthma  Treatment: Inhaler prn,                 Neurologic:       Cardiovascular: Comment: Negative cardiac workups for SHAH.    (+)  - -   -  - -           SHAH.                           METS/Exercise Tolerance: >4 METS    Hematologic:       Musculoskeletal:       GI/Hepatic:       Renal/Genitourinary:       Endo:     (+)          thyroid problem,            Psychiatric/Substance Use:       Infectious Disease:       Malignancy:       Other:          Physical Exam    Airway  airway exam normal    Comment: History of difficult airway, successfully intubated for hysterectomy using glidescope.    "  Mallampati: III   TM distance: > 3 FB   Neck ROM: full   Mouth opening: > 3 cm    Respiratory Devices and Support         Dental       (+) Modest Abnormalities - crowns, retainers, 1 or 2 missing teeth      Cardiovascular   cardiovascular exam normal       Rhythm and rate: regular and normal     Pulmonary   pulmonary exam normal        breath sounds clear to auscultation       OUTSIDE LABS:  CBC:   Lab Results   Component Value Date    WBC 12.6 (H) 04/10/2024    WBC 12.3 (H) 04/10/2024    HGB 14.8 04/10/2024    HGB 14.3 04/05/2024    HCT 44.0 04/10/2024    HCT 42.5 04/05/2024     04/10/2024     04/05/2024     BMP:   Lab Results   Component Value Date     04/10/2024     04/05/2024    POTASSIUM 4.4 04/10/2024    POTASSIUM 4.0 04/05/2024    CHLORIDE 103 04/10/2024    CHLORIDE 102 04/05/2024    CO2 24 04/10/2024    CO2 25 04/05/2024    BUN 12.4 04/10/2024    BUN 10.6 04/05/2024    CR 0.74 04/10/2024    CR 0.76 04/05/2024     (H) 04/10/2024     (H) 04/05/2024     COAGS: No results found for: \"PTT\", \"INR\", \"FIBR\"  POC:   Lab Results   Component Value Date    HCG Negative 04/14/2022    HCGS Negative 05/21/2022     HEPATIC:   Lab Results   Component Value Date    ALBUMIN 4.8 04/10/2024    PROTTOTAL 7.8 04/10/2024    ALT 34 04/10/2024    AST 23 04/10/2024    ALKPHOS 77 04/10/2024    BILITOTAL 0.8 04/10/2024     OTHER:   Lab Results   Component Value Date    LACT 1.1 05/16/2022    VERONIQUE 9.9 04/10/2024    MAG 1.9 05/21/2022    LIPASE 30 04/05/2024    TSH 2.56 04/05/2024    T4 0.86 06/01/2021    CRP 12.1 (H) 03/21/2022    SED 35 (H) 04/10/2024       Anesthesia Plan    ASA Status:  2    NPO Status:  NPO Appropriate    Anesthesia Type: MAC.     - Reason for MAC: straight local not clinically adequate   Induction: Intravenous.   Maintenance: Balanced.        Consents    Anesthesia Plan(s) and associated risks, benefits, and realistic alternatives discussed. Questions answered and " "patient/representative(s) expressed understanding.     - Discussed: Risks, Benefits and Alternatives for BOTH SEDATION and the PROCEDURE were discussed     - Discussed with:  Patient, Spouse            Postoperative Care            Comments:    Other Comments: Risks, benefits and alternatives discussed and would like to proceed. General anesthesia ok if indicated.              ETHEL Torrez CRNA    I have reviewed the pertinent notes and labs in the chart from the past 30 days and (re)examined the patient.  Any updates or changes from those notes are reflected in this note.              # Obesity: Estimated body mass index is 36.94 kg/m  as calculated from the following:    Height as of this encounter: 1.651 m (5' 5\").    Weight as of this encounter: 100.7 kg (222 lb).      "

## 2024-07-09 NOTE — DISCHARGE INSTRUCTIONS
[facility]  Adult Discharge Orders & Instructions    ________________________________________________________________          For 12 hours after surgery  Get plenty of rest.  A responsible adult must stay with you for at least 12 hours after you leave the hospital.   You may feel lightheaded.  IF so, sit for a few minutes before standing.  Have someone help you get up.   You may have a slight fever. Call the doctor if your fever is over 101 F (38.3 C) (taken under the tongue) or lasts longer than 24 hours.  You may have a dry mouth, a sore throat, muscle aches or trouble sleeping.  These should go away after 24 hours.  Do not make important or legal decisions.  6.   Do not drive or use heavy equipment.  If you have weakness or tingling, don't drive or use heavy equipment until this feeling goes away.    To contact a doctor, call   [phone]_______________________      Your doctor recommends that you eat 25 to 30 Grams of fiber daily. The following are some examples of fiber amounts in different foods.    Fruits: Apple (with skin) 1 medium = 4.4 Grams   Banana      1 medium = 3.1 Grams   Oranges     1 orange = 3.1 Grams   Prunes     1 cup, pitted = 12.4 Grams    Juices: Apple, unsweetened w/ added ascorbic acid  1 cup = 0.5 Grams    Grapefruit, white, canned,sweetened  1 cup = 0.2 Grams    Grape, unsweetened w/ added ascorbic acid  1 cup = 0.5 Grams    Orange     1 cup = 0.7 Grams    Vegetables:   Cooked: Green Beans   1 cup = 4.0 Grams       Carrots   1/2 cup sliced = 2.3 Grams       Peas       1 cup = 8.8 Grams       Potato (baked, with skin)  1 medium = 3.8 Grams    Raw: Cucumber (with peel)  1 cucumber = 1.5 Grams            Lettuce     1 cup shredded = 0.5 Grams            Tomato   1 medium tomato = 1.5 Grams            Spinach  1 cup = 0.7 Grams    Legumes: Baked beans, canned, no salt added  1 cup = 13.9 Grams         Kidney Beans, canned  1 cup = 13.6 Grams         Palafox Beans, canned     1  cup = 11.6 Grams         Lentils, boiled   1 cup = 15.6 Grams    Breads, Pastas, Flours: Bran muffins   1 medium muffin = 5.2 Grams           Oatmeal, cooked  1 cup = 4.0 Grams           White Bread   1 slice = 0.6 Grams           Whole- wheat bread = 1.9 Grams    Pasta and rice, cooked: Macaroni  1 cup = 2.5 Grams           Rice, Brown  1 cup = 3.5 Grams           Rice, white   1 cup = 0.6 Grams           Spaghetti (regular) 1 cup = 2.5 Grams    Nuts: Almonds   1 cup = 17.4 Grams            Peanuts    1 cup = 12.4 Grams

## 2024-07-09 NOTE — OR NURSING
Discharge instructions given to patient and patient's spouse. No questions. W.C out of unit. Chronic lower abdominal pain, reports has chronic nausea with the lower abdominal discomfort.

## 2024-07-09 NOTE — H&P
GENERAL SURGERY CONSULTATION NOTE    Suzi Miller   [address] [facility] [address]  39 year old  female    Primary Care Provider:  No Ref-Primary, Physician      HPI: Suzi Miller presents to day surgery in need of EGD and colonoscopy for GERD and abdominal pain, change in bowel habits.        REVIEW OF SYSTEMS:    GENERAL: No fevers or chills. Denies fatigue, recent weight loss.  HEENT: No sinus drainage. No changes with vision or hearing. No difficulty swallowing.   LYMPHATICS:  Noswollen nodes in axilla, neck or groin.  CARDIOVASCULAR: Denies chest pain, palpitations and dyspnea on exertion.  PULMONARY: No shortness of breath or cough. No increase in sputum production.  GI: Denies melena,bright red blood in stools. No hematemesis. No constipation or diarrhea.  : No dysuria or hematuria.  SKIN: No recent rashes or ulcers.   HEMATOLOGY:  No history of easy bruising or bleeding.  ENDOCRINE:  No history of diabetes or thyroid problems.  NEUROLOGY:  No history of seizures or headaches. No motor or sensory changes.        Patient Active Problem List   Diagnosis    Endometriosis    Hashimoto's disease    Restrictive airway disease    Hard to intubate    Dysmenorrhea       Past Medical History:   Diagnosis Date    Complication of anesthesia     difficult intubation, slow to wake up    Endometriosis     Hashimoto's thyroiditis     Restrictive airway disease        Past Surgical History:   Procedure Laterality Date    C/SECTION, LOW TRANSVERSE      CLOSED RX BIG TOE FRACTURE  2009    hardware    EXCISE NODULE THYROID      LAPAROSCOPIC HYSTERECTOMY TOTAL, SALPINGECTOMY Bilateral 4/14/2022    Procedure: total laparoscopic hysterectomy, bilateral salpingectomy, cystoscopy, left oophorectomy;  Surgeon: Manuela Loaiza MD;  Location: [facility] [place], CERVICAL  2003       Family History   Problem Relation Age of Onset    Early menopause Mother 41    Heart Disease Father     Heart Disease Paternal  Grandmother 62    Early menopause Paternal Grandmother        Social History     Social History Narrative        3 boys (2012, 2009, 2005)    Non smoker       Social History     Socioeconomic History    Marital status:      Spouse name: Not on file    Number of children: Not on file    Years of education: Not on file    Highest education level: Not on file   Occupational History    Not on file   Tobacco Use    Smoking status: Never    Smokeless tobacco: Never   Vaping Use    Vaping status: Never Used   Substance and Sexual Activity    Alcohol use: Yes     Comment: occasionally    Drug use: Never    Sexual activity: Yes     Partners: Male     Birth control/protection: Female Surgical     Comment: TLCHUN/BS 4/14/22   Other Topics Concern    Not on file   Social History Narrative        3 boys (2012, 2009, 2005)    Non smoker     Social Determinants of Health     Financial Resource Strain: Low Risk  (4/10/2024)    Financial Resource Strain     Within the past 12 months, have you or your family members you live with been unable to get utilities (heat, electricity) when it was really needed?: No   Food Insecurity: Low Risk  (4/10/2024)    Food Insecurity     Within the past 12 months, did you worry that your food would run out before you got money to buy more?: No     Within the past 12 months, did the food you bought just not last and you didn t have money to get more?: No   Transportation Needs: Low Risk  (4/10/2024)    Transportation Needs     Within the past 12 months, has lack of transportation kept you from medical appointments, getting your medicines, non-medical meetings or appointments, work, or from getting things that you need?: No   Physical Activity: Unknown (4/10/2024)    Exercise Vital Sign     Days of Exercise per Week: 2 days     Minutes of Exercise per Session: Not on file   Stress: Stress Concern Present (4/10/2024)    [employer] - Occupational Stress  "Questionnaire     Feeling of Stress : To some extent   Social Connections: Unknown (4/10/2024)    Social Connection and Isolation Panel [NHANES]     Frequency of Communication with Friends and Family: Not on file     Frequency of Social Gatherings with Friends and Family: Once a week     Attends L[employer] day Services: Not on file     Active Member of Clubs or Organizations: Not on file     Attends Club or Organization Meetings: Not on file     Marital Status: Not on file   Interpersonal Safety: Low Risk  (4/10/2024)    Interpersonal Safety     Do you feel physically and emotionally safe where you currently live?: Yes     Within the past 12 months, have you been hit, slapped, kicked or otherwise physically hurt by someone?: No     Within the past 12 months, have you been humiliated or emotionally abused in other ways by your partner or ex-partner?: No   Housing Stability: Low Risk  (4/10/2024)    Housing Stability     Do you have housing? : Yes     Are you worried about losing your housing?: No       Current Facility-Administered Medications   Medication Dose Route Frequency Provider Last Rate Last Admin    lactated ringers infusion   Intravenous Continuous Wil Rivers MD        lidocaine (LMX4) cream   Topical Q1H PRN Wil Rivers MD        lidocaine 1 % 0.1-1 mL  0.1-1 mL Other Q1H PRN Wil Rivers MD        sodium chloride (PF) 0.9% PF flush 3 mL  3 mL Intracatheter Q8H Wil Rivers MD        sodium chloride (PF) 0.9% PF flush 3 mL  3 mL Intracatheter q1 min prn Wil Rivers MD             ALLERGIES/SENSITIVITIES:   Allergies   Allergen Reactions    Penicillins Anaphylaxis    Reglan [Metoclopramide]      seizure       PHYSICAL EXAM:     /87   Pulse 96   Temp 97.8  F (36.6  C) (Tympanic)   Resp 14   Ht 1.651 m (5' 5\")   Wt 100.7 kg (222 lb)   LMP 03/27/2022   SpO2 96%   BMI 36.94 kg/m      General Appearance:   Sitting up in bed, no apparent " distress  HEENT: Pupils are equal and reactive, no scleral icterus   Heart & CV:  RRR, no murmur.  LUNGS: No increased work of breathing. Lungs are CTA B/L, no wheezing or crackles.  Abd:  soft, non-tender, no masses   Ext: no lower extremity edema   Neuro: alert and oriented, normal speech and mentation         CONSULTATION ASSESSMENT AND PLAN:    39 year old female with GERD and abdominal pain with change in bowel habits    The technical details of esophagogastroduodenoscopy and colonoscopy were discussed with the patient along with the risks and benefits to include bleeding, perforation, aspiration and incomplete study. Suzi Miller demonstrated understanding and is willing to proceed.       Wil Rivers MD on 7/9/2024 at 10:22 AM

## 2024-11-27 ENCOUNTER — OFFICE VISIT (OUTPATIENT)
Dept: FAMILY MEDICINE | Facility: OTHER | Age: 39
End: 2024-11-27
Attending: NURSE PRACTITIONER
Payer: COMMERCIAL

## 2024-11-27 VITALS
BODY MASS INDEX: 39.51 KG/M2 | WEIGHT: 223 LBS | TEMPERATURE: 98.1 F | DIASTOLIC BLOOD PRESSURE: 78 MMHG | HEIGHT: 63 IN | HEART RATE: 99 BPM | OXYGEN SATURATION: 99 % | RESPIRATION RATE: 16 BRPM | SYSTOLIC BLOOD PRESSURE: 126 MMHG

## 2024-11-27 DIAGNOSIS — S61.217A LACERATION OF LEFT LITTLE FINGER WITHOUT FOREIGN BODY WITHOUT DAMAGE TO NAIL, INITIAL ENCOUNTER: Primary | ICD-10-CM

## 2024-11-27 PROCEDURE — 96372 THER/PROPH/DIAG INJ SC/IM: CPT

## 2024-11-27 PROCEDURE — 250N000009 HC RX 250: Mod: JZ

## 2024-11-27 RX ORDER — LIDOCAINE HYDROCHLORIDE 10 MG/ML
2 INJECTION, SOLUTION EPIDURAL; INFILTRATION; INTRACAUDAL; PERINEURAL ONCE
Status: COMPLETED | OUTPATIENT
Start: 2024-11-27 | End: 2024-11-27

## 2024-11-27 RX ADMIN — LIDOCAINE HYDROCHLORIDE 2 ML: 10 INJECTION, SOLUTION EPIDURAL; INFILTRATION; INTRACAUDAL; PERINEURAL at 18:00

## 2024-11-27 ASSESSMENT — ENCOUNTER SYMPTOMS
WOUND: 1
CONSTITUTIONAL NEGATIVE: 1
RESPIRATORY NEGATIVE: 1
CARDIOVASCULAR NEGATIVE: 1
MUSCULOSKELETAL NEGATIVE: 1

## 2024-11-27 ASSESSMENT — PAIN SCALES - GENERAL: PAINLEVEL_OUTOF10: MODERATE PAIN (5)

## 2024-11-27 NOTE — PROGRESS NOTES
Suzi Miller  1985    ASSESSMENT/PLAN      1. Laceration of left little finger without foreign body without damage to nail, initial encounter (Primary)  - lidocaine (PF) (XYLOCAINE) 1 % injection 2 mL  - REPAIR SUPERFICIAL, WOUND BODY < =2.5CM    - 1% xylocaine without epi used for digital nerve block of left little finger. No complications. Circulation intact.  -Cleansed skin with Hibiclens. 5-0 Prolene used, 2 sutures placed to the distal interphalangeal joint skin of left little finger. No bleeding. Well approximated. Full ROM preserved. Circulation intact.   -Recommended use of bacitracin or Vaseline to sutures daily with bandage changes.  Okay to remove sutures after 7-10 days. Monitor for signs and symptoms of infection, which includes warmth, drainage, fever, and increased pain.  - May use over-the-counter Tylenol or ibuprofen PRN  - Follow up as needed for new or worsening symptoms      Procedural note:  Options are discussed and patient decided to proceed with the suture placement.  Risks and benefits discussed.  Verbal consent obtained.    PROCEDURE:  Laceration repair  LOCATION: left distal interphalangeal skin of little finger  LACERATION LENGTH:  1.5 cm  ANESTHESIA:  Local using Lidocaine 1% without Epinephrine, total of 1 ml  PREPARATION:  Cleansed with chloroprep  CONTAMINATION: The wound is not contaminated.  FOREIGN BODIES: none  DEBRIDEMENT:  No debridement  TENDON INVOLVEMENT: none  CLOSURE:  Wound closed in one layer.  Skin closed with total of 2 sutures using 5-0 Prolene  TECHNIQUE: interrupted  APPROXIMATION: close  APROXIMATION DIFFICULTY: simple    Patient tolerance: Patient tolerated the procedure well with no immediate complications.        *Explanation of diagnosis, treatment options and risk and benefits of medications reviewed with patient. Patient agrees with plan of care.  *All questions were answered.    *Red flags symptoms were discussed and patient was advised when they  "should return for reevaluation or for prompt emergency evaluation.   *Patient was given verbal and written instructions on plan of care. Instructions were printed or are available on Mychart on electronic AVS.   *We discussed potential side effects of any prescribed or recommended therapies, as well as expectations for response to treatments.  *Patient discharged in stable condition    Cristi Baxter, MARÍA, APRN, FNP-C  [facility]    SUBJECTIVE  CHIEF COMPLAINT/ REASON FOR VISIT  Patient presents with:  Finger: Right pinky- laceration at 4 p  Laceration: Pinky R     HISTORY OF PRESENT ILLNESS  Suzi Miller is a pleasant 39 year old female [facility] with a laceration to the left little finger.  She was using an old razor blade and accidentally cut her little finger.  The cut is approximately 1.5 cm and located on the skin over the interphalangeal joint of left little finger. Denies numbness or tingling. Full sensation intact. Full ROM intact.  She is up-to-date on her Tdap as of 10/8/2020.      History provided by patient.      I have reviewed the nursing notes.  I have reviewed allergies, medication list, problem list, and past medical history.    REVIEW OF SYSTEMS  Review of Systems   Constitutional: Negative.    HENT: Negative.     Respiratory: Negative.     Cardiovascular: Negative.    Musculoskeletal: Negative.    Skin:  Positive for wound.        VITAL SIGNS  Vitals:    11/27/24 1721   BP: 126/78   BP Location: Left arm   Patient Position: Sitting   Cuff Size: Adult Large   Pulse: 99   Resp: 16   Temp: 98.1  F (36.7  C)   TempSrc: Tympanic   SpO2: 99%   Weight: 101.2 kg (223 lb)   Height: 1.6 m (5' 3\")      Body mass index is 39.5 kg/m .      OBJECTIVE  PHYSICAL EXAM  Physical Exam  Vitals and nursing note reviewed.   Constitutional:       General: She is not in acute distress.     Appearance: Normal appearance. She is normal weight. She is not ill-appearing, " toxic-appearing or diaphoretic.   HENT:      Head: Normocephalic and atraumatic.   Skin:     General: Skin is warm and dry.      Capillary Refill: Capillary refill takes less than 2 seconds.      Findings: Laceration (Left distal interpharangeal joint) present.   Neurological:      Mental Status: She is alert.            DIAGNOSTICS  No results found for any visits on 11/27/24.

## 2024-11-27 NOTE — NURSING NOTE
"Chief Complaint   Patient presents with    Finger     Right pinky- laceration at 4 p    Laceration     Pinky R     Patient cut pinky at about 4 this afternoon with razorblade, when chaging blades from campos.    Initial /78 (BP Location: Left arm, Patient Position: Sitting, Cuff Size: Adult Large)   Pulse 99   Temp 98.1  F (36.7  C) (Tympanic)   Resp 16   Ht 1.6 m (5' 3\")   Wt 101.2 kg (223 lb)   LMP 03/27/2022   SpO2 99%   BMI 39.50 kg/m   Estimated body mass index is 39.5 kg/m  as calculated from the following:    Height as of this encounter: 1.6 m (5' 3\").    Weight as of this encounter: 101.2 kg (223 lb).     Advance Care Directive on file? y    FOOD SECURITY SCREENING QUESTIONS:    The next two questions are to help us understand your food security.  If you are feeling you need any assistance in this area, we have resources available to support you today.    Hunger Vital Signs:  Within the past 12 months we worried whether our food would run out before we got money to buy more. Never  Within the past 12 months the food we bought just didn't last and we didn't have money to get more. Never  Juju Hernandez LPN,LPN on 11/27/2024 at 5:22 PM      Juju Hernandez LPN     "

## 2024-12-26 ENCOUNTER — OFFICE VISIT (OUTPATIENT)
Dept: FAMILY MEDICINE | Facility: OTHER | Age: 39
End: 2024-12-26
Attending: STUDENT IN AN ORGANIZED HEALTH CARE EDUCATION/TRAINING PROGRAM
Payer: COMMERCIAL

## 2024-12-26 VITALS
DIASTOLIC BLOOD PRESSURE: 98 MMHG | SYSTOLIC BLOOD PRESSURE: 136 MMHG | OXYGEN SATURATION: 98 % | BODY MASS INDEX: 39.87 KG/M2 | HEART RATE: 75 BPM | WEIGHT: 225 LBS | HEIGHT: 63 IN | TEMPERATURE: 98.9 F | RESPIRATION RATE: 12 BRPM

## 2024-12-26 DIAGNOSIS — R06.02 SHORTNESS OF BREATH: ICD-10-CM

## 2024-12-26 DIAGNOSIS — R05.1 ACUTE COUGH: ICD-10-CM

## 2024-12-26 DIAGNOSIS — J98.8 RESPIRATORY INFECTION: ICD-10-CM

## 2024-12-26 DIAGNOSIS — J06.9 VIRAL URI WITH COUGH: Primary | ICD-10-CM

## 2024-12-26 RX ORDER — PREDNISONE 20 MG/1
TABLET ORAL
Qty: 20 TABLET | Refills: 0 | Status: SHIPPED | OUTPATIENT
Start: 2024-12-26

## 2024-12-26 RX ORDER — BENZONATATE 200 MG/1
200 CAPSULE ORAL 3 TIMES DAILY PRN
Qty: 30 CAPSULE | Refills: 0 | Status: SHIPPED | OUTPATIENT
Start: 2024-12-26

## 2024-12-26 ASSESSMENT — PAIN SCALES - GENERAL: PAINLEVEL_OUTOF10: SEVERE PAIN (6)

## 2024-12-26 NOTE — PROGRESS NOTES
ASSESSMENT/PLAN:    I have reviewed the nursing notes.  I have reviewed the findings, diagnosis, plan and need for follow up with the patient.    1. Respiratory infection  2. Acute cough  3. Shortness of breath  4. Viral URI with cough (Primary)    - benzonatate (TESSALON) 200 MG capsule; Take 1 capsule (200 mg) by mouth 3 times daily as needed for cough.  Dispense: 30 capsule; Refill: 0    - predniSONE (DELTASONE) 20 MG tablet; Take 3 tabs by mouth daily x 3 days, then 2 tabs daily x 3 days, then 1 tab daily x 3 days, then 1/2 tab daily x 3 days.  Dispense: 20 tablet; Refill: 0    - Please read the attached information on viral upper respiratory infection and cough for at home care treatment as discussed in the clinic this afternoon.     - Discussed with patient that symptoms and exam are consistent with viral illness.  Discussed that symptomatic treatment is appropriate but not with antibiotics.      - Symptomatic treatment - Encouraged fluids, salt water gargles, honey (only if greater than 1 year in age due to risk of botulism), elevation, humidifier, sinus rinse/netti pot, lozenges, tea, topical vapor rub, popsicles, rest, etc     - May use over-the-counter Tylenol and ibuprofen as needed for pain, inflammation or fever    - Discussed warning signs/symptoms indicative of need to f/u    - Follow up if symptoms persist or worsen or concerns    - I explained my diagnostic considerations and recommendations to the patient, who voiced understanding and agreement with the treatment plan. All questions were answered. We discussed potential side effects of any prescribed or recommended therapies, as well as expectations for response to treatments.    ETHEL Naylor CNP  12/26/2024  3:13 PM    HPI:    Suzi Miller is a 39 year old female who presents to [facility] today for concerns of COVID positive on 12/21/2024 states feeling worse.  Complaining of cough, fever, headache, body aches, congestion, shortness of  breath, dizziness, lightheadedness.  Symptoms began last Friday on 12/20/24.  A couple days ago had diarrhea, none since after taking imodium.  At home care treatment consists of Mucinex, Sudafed, fluids and rest.  Patient does decline so testing for influenza due to onset of symptoms.  Declines any other testing today as well.  Patient denies chest pain, otalgia, rhinorrhea, sore throat, nausea, vomiting, diarrhea, constipation or rash.    Past Medical History:   Diagnosis Date    Complication of anesthesia     difficult intubation, slow to wake up    Endometriosis     Hashimoto's thyroiditis     Restrictive airway disease      Past Surgical History:   Procedure Laterality Date    C/SECTION, LOW TRANSVERSE      CLOSED RX BIG TOE FRACTURE  2009    hardware    COLONOSCOPY N/A 07/09/2024    Normal 10 year follow up    ESOPHAGOSCOPY, GASTROSCOPY, DUODENOSCOPY (EGD), COMBINED N/A 07/09/2024    normal small bowel, stomach, esophagus    EXCISE NODULE THYROID      LAPAROSCOPIC HYSTERECTOMY TOTAL, SALPINGECTOMY Bilateral 04/14/2022    Procedure: total laparoscopic hysterectomy, bilateral salpingectomy, cystoscopy, left oophorectomy;  Surgeon: Manuela Loaiza MD;  Location:  OR    LEEP TX, CERVICAL  2003     Social History     Tobacco Use    Smoking status: Never    Smokeless tobacco: Never   Substance Use Topics    Alcohol use: Yes     Comment: occasionally     Current Outpatient Medications   Medication Sig Dispense Refill    cholecalciferol (VITAMIN D3) 25 mcg (1000 units) capsule Take 1 capsule by mouth daily.      EPINEPHrine (ANY BX GENERIC EQUIV) 0.3 MG/0.3ML injection 2-pack Inject 0.3 mLs (0.3 mg) into the muscle once as needed for anaphylaxis 1 each 0    melatonin 3 MG tablet Take 3 mg by mouth nightly as needed for sleep      ondansetron (ZOFRAN ODT) 4 MG ODT tab Take 1 tablet (4 mg) by mouth every 8 hours as needed for nausea 12 tablet 0    ondansetron (ZOFRAN ODT) 4 MG ODT tab Take 1 tablet (4 mg) by mouth  "every 6 hours as needed for nausea 20 tablet 0    SUMAtriptan (IMITREX) 50 MG tablet Take 1 tablet (50 mg) by mouth at onset of headache for migraine May repeat in 2 hours. Max 4 tablets/24 hours. 90 tablet 3    albuterol (PROAIR HFA/PROVENTIL HFA/VENTOLIN HFA) 108 (90 Base) MCG/ACT inhaler Inhale 2 puffs into the lungs every 6 hours as needed for shortness of breath / dyspnea or wheezing (Patient not taking: Reported on 12/26/2024) 18 g 0    APAP-Pamabrom-Pyrilamine (PAMPRIN MULTI-SYMPTOM PO) Take by mouth At Bedtime (Patient not taking: Reported on 12/26/2024)      benzonatate (TESSALON) 200 MG capsule Take 1 capsule (200 mg) by mouth 3 times daily as needed for cough (Patient not taking: Reported on 12/26/2024) 15 capsule 0    hydrocortisone, Perianal, (HYDROCORTISONE) 2.5 % cream Place rectally 2 times daily as needed for hemorrhoids (Patient not taking: Reported on 12/26/2024) 30 g 1    predniSONE (DELTASONE) 20 MG tablet Take 2 daily for 4 days, then 1 daily for 4 days. (Patient not taking: Reported on 12/26/2024) 12 tablet 0    UNABLE TO FIND Take 4 chew tab by mouth daily Juice Plus - 2 fruit, 2 vegetable (Patient not taking: Reported on 12/26/2024)      UNABLE TO FIND Take 1 tablet by mouth daily [place] (Patient not taking: Reported on 12/26/2024)       Allergies   Allergen Reactions    Penicillins Anaphylaxis    Reglan [Metoclopramide]      seizure     Past medical history, past surgical history, current medications and allergies reviewed and accurate to the best of my knowledge.      ROS:  Refer to HPI    BP (!) 136/98 (BP Location: Left arm, Patient Position: Sitting, Cuff Size: Adult Regular)   Pulse 75   Temp 98.9  F (37.2  C) (Tympanic)   Resp 12   Ht 1.6 m (5' 3\")   Wt 102.1 kg (225 lb)   LMP 03/27/2022   SpO2 98%   BMI 39.86 kg/m      EXAM:  General Appearance: Well appearing 39 year old female, appropriate appearance for age. No acute distress   Ears: Left TM intact, translucent with " bony landmarks appreciated, no erythema, no effusion, no bulging, no purulence.  Right TM intact, translucent with bony landmarks appreciated, no erythema, no effusion, no bulging, no purulence.  Left auditory canal clear.  Right auditory canal clear.  Normal external ears, non tender.  Eyes: conjunctivae normal without erythema or irritation, corneas clear, no drainage or crusting, no eyelid swelling, pupils equal   Oropharynx: moist mucous membranes, posterior pharynx without erythema, tonsils symmetric, no erythema, no exudates or petechiae, no post nasal drip seen, no trismus, voice clear.    Sinuses:  No sinus tenderness upon palpation of the frontal or maxillary sinuses  Nose:  Bilateral nares: no erythema, no edema, no drainage, + congestion   Neck: supple without adenopathy  Respiratory: normal chest wall and respirations.  Normal effort.  Clear to auscultation bilaterally, no wheezing, crackles or rhonchi.  No increased work of breathing.  Dry cough appreciated.  Cardiac: RRR with no murmurs   Musculoskeletal:  Equal movement of bilateral upper extremities.  Equal movement of bilateral lower extremities.  Normal gait.    Neuro: Alert and oriented to person, place, and time.   Psychological: normal affect, alert, oriented, and pleasant.

## 2024-12-26 NOTE — NURSING NOTE
"Chief Complaint   Patient presents with    Cough     X 3 days    Fever     Not until 12/22 and 12/23    Generalized Body Aches     Sx started 12/20 - covid positive on 12/21     Patient tx with sudafed all in one am/pm meds with little relief.    Initial BP (!) 136/98 (BP Location: Left arm, Patient Position: Sitting, Cuff Size: Adult Regular)   Pulse 75   Temp 98.9  F (37.2  C) (Tympanic)   Resp 12   Ht 1.6 m (5' 3\")   Wt 102.1 kg (225 lb)   LMP 03/27/2022   SpO2 98%   BMI 39.86 kg/m   Estimated body mass index is 39.86 kg/m  as calculated from the following:    Height as of this encounter: 1.6 m (5' 3\").    Weight as of this encounter: 102.1 kg (225 lb).     Advance Care Directive on file? y    FOOD SECURITY SCREENING QUESTIONS:    The next two questions are to help us understand your food security.  If you are feeling you need any assistance in this area, we have resources available to support you today.    Hunger Vital Signs:  Within the past 12 months we worried whether our food would run out before we got money to buy more. Never  Within the past 12 months the food we bought just didn't last and we didn't have money to get more. Never  Juju Hernandez LPN,LPN on 12/26/2024 at 3:00 PM      Juju Hernandez LPN     "

## 2025-05-17 ENCOUNTER — HEALTH MAINTENANCE LETTER (OUTPATIENT)
Age: 40
End: 2025-05-17

## 2025-08-19 ENCOUNTER — APPOINTMENT (OUTPATIENT)
Dept: GENERAL RADIOLOGY | Facility: OTHER | Age: 40
End: 2025-08-19
Attending: PHYSICIAN ASSISTANT
Payer: COMMERCIAL

## 2025-08-19 ENCOUNTER — HOSPITAL ENCOUNTER (EMERGENCY)
Facility: OTHER | Age: 40
Discharge: HOME | End: 2025-08-19
Attending: PHYSICIAN ASSISTANT
Payer: COMMERCIAL

## 2025-08-19 ENCOUNTER — APPOINTMENT (OUTPATIENT)
Dept: CT IMAGING | Facility: OTHER | Age: 40
End: 2025-08-19
Attending: PHYSICIAN ASSISTANT
Payer: COMMERCIAL

## 2025-08-19 PROCEDURE — 71046 X-RAY EXAM CHEST 2 VIEWS: CPT

## 2025-08-19 PROCEDURE — 71046 X-RAY EXAM CHEST 2 VIEWS: CPT | Mod: 26 | Performed by: RADIOLOGY

## 2025-08-19 ASSESSMENT — ACTIVITIES OF DAILY LIVING (ADL)
ADLS_ACUITY_SCORE: 41

## 2025-08-19 ASSESSMENT — COLUMBIA-SUICIDE SEVERITY RATING SCALE - C-SSRS
2. HAVE YOU ACTUALLY HAD ANY THOUGHTS OF KILLING YOURSELF IN THE PAST MONTH?: NO
1. IN THE PAST MONTH, HAVE YOU WISHED YOU WERE DEAD OR WISHED YOU COULD GO TO SLEEP AND NOT WAKE UP?: NO
6. HAVE YOU EVER DONE ANYTHING, STARTED TO DO ANYTHING, OR PREPARED TO DO ANYTHING TO END YOUR LIFE?: NO

## 2025-08-20 ASSESSMENT — ENCOUNTER SYMPTOMS
SHORTNESS OF BREATH: 0
BACK PAIN: 1
COUGH: 0
ABDOMINAL PAIN: 0
NECK PAIN: 1

## (undated) DEVICE — PREP CHLORAPREP 26ML TINTED ORANGE  260815

## (undated) DEVICE — SOL WATER 1500ML

## (undated) DEVICE — ENDO BRUSH CHANNEL MASTER CLEANING 2-4.2MM BW-412T

## (undated) DEVICE — SU VICRYL 0 UR-6 27" J603H

## (undated) DEVICE — ADH SKIN CLOSURE PREMIERPRO EXOFIN 1.0ML 3470

## (undated) DEVICE — TROCAR KII SLEEVE 5MM X 100MM 12/BX

## (undated) DEVICE — Device

## (undated) DEVICE — ENDO KIT COMPLIANCE DYKENDOCMPLY

## (undated) DEVICE — TUBING SUCTION 10'X3/16" N510

## (undated) DEVICE — SU MONOCRYL 3-0 PS-2 27" Y427H

## (undated) DEVICE — SYR 10ML LL W/O NDL 302995

## (undated) DEVICE — SURGICEL POWDER ABSORBABLE HEMOSTAT 3GM 3013SP

## (undated) DEVICE — ENDO SCOPE WARMER DUAL LAP TM500D

## (undated) DEVICE — ENDO BITE BLOCK 60 MAXI LF 00712804

## (undated) DEVICE — SUCTION STRYKERFLOW II 250-070-500

## (undated) DEVICE — DEVICE ENDO STITCH APPLIER 10MM 173016

## (undated) DEVICE — COVER LIGHT HANDLE LT-F02

## (undated) DEVICE — DECANTER VIAL 2006S

## (undated) DEVICE — GLOVE PROTEXIS BLUE W/NEU-THERA 6.5  2D73EB65

## (undated) DEVICE — PANTIES MESH LG/XLG 2PK 706M2

## (undated) DEVICE — SUCTION MANIFOLD NEPTUNE 2 SYS 4 PORT 0702-020-000

## (undated) DEVICE — SU WND CLOSURE VLOC 180 ABS 2-0 8" VLOCA208L

## (undated) DEVICE — VERRES NEEDLE 120MM DISPOSABLE 12/BX

## (undated) DEVICE — SLEEVE COMPRESSION SCD KNEE MED 74022

## (undated) DEVICE — ESU LIGASURE LAPAROSCPC L-HOOK SEALER/DVDR 5MM-44CM LF5644

## (undated) DEVICE — PACK LAPAROSCOPY LF SBA15LPFCA

## (undated) DEVICE — SYR 50ML LL W/O NDL 309653

## (undated) DEVICE — PAD CHUX UNDERPAD 30X36" P3036C

## (undated) DEVICE — DRAPE LAVH/LAPAROSCOPY W/POUCH 29474

## (undated) DEVICE — ENDO TROCAR FIRST ENTRY KII FIOS ADV FIX 12X100MM CFF73

## (undated) DEVICE — TUBING INSUFFLATOR W/FILTER OLYMPUS WA95005A

## (undated) DEVICE — KIT PATIENT POSITIONING PIGAZZI LATEX FREE 40580

## (undated) DEVICE — RETR ELEV / UTERINE MANIPULATOR V-CARE MED CUP 60-6085-201

## (undated) DEVICE — PAD PERI INDIV WRAP 11" 2022A

## (undated) DEVICE — APPLICATOR ENDOSCOPIC 5 SURGICEL POWDER 3123SPEA

## (undated) DEVICE — TUBING IRR TUR Y TYPE 2C4041

## (undated) DEVICE — ESU HOLDER LAP INST DISP PURPLE LONG 330MM H-PRO-330

## (undated) DEVICE — ENDO TROCAR FIRST ENTRY KII FIOS ADV FIX 05X100MM CFF03

## (undated) DEVICE — GLOVE PROTEXIS POWDER FREE SMT 6.5  2D72PT65X

## (undated) DEVICE — ENDO FORCEP ENDOJAW BIOPSY 2.8MMX230CM FB-220U

## (undated) DEVICE — TRAY DRY SKIN PREP TRAY PREMIUM DYND70661

## (undated) RX ORDER — HYDROMORPHONE HYDROCHLORIDE 1 MG/ML
INJECTION, SOLUTION INTRAMUSCULAR; INTRAVENOUS; SUBCUTANEOUS
Status: DISPENSED
Start: 2022-04-14

## (undated) RX ORDER — ONDANSETRON 2 MG/ML
INJECTION INTRAMUSCULAR; INTRAVENOUS
Status: DISPENSED
Start: 2022-05-21

## (undated) RX ORDER — FENTANYL CITRATE 50 UG/ML
INJECTION, SOLUTION INTRAMUSCULAR; INTRAVENOUS
Status: DISPENSED
Start: 2022-04-14

## (undated) RX ORDER — ONDANSETRON 2 MG/ML
INJECTION INTRAMUSCULAR; INTRAVENOUS
Status: DISPENSED
Start: 2022-04-14

## (undated) RX ORDER — PROPOFOL 10 MG/ML
INJECTION, EMULSION INTRAVENOUS
Status: DISPENSED
Start: 2024-07-09

## (undated) RX ORDER — PROPOFOL 10 MG/ML
INJECTION, EMULSION INTRAVENOUS
Status: DISPENSED
Start: 2022-04-14

## (undated) RX ORDER — SODIUM CHLORIDE 9 MG/ML
INJECTION, SOLUTION INTRAVENOUS
Status: DISPENSED
Start: 2022-03-20

## (undated) RX ORDER — SODIUM CHLORIDE 9 MG/ML
INJECTION, SOLUTION INTRAVENOUS
Status: DISPENSED
Start: 2022-05-21

## (undated) RX ORDER — LIDOCAINE HYDROCHLORIDE 20 MG/ML
JELLY TOPICAL
Status: DISPENSED
Start: 2022-04-14

## (undated) RX ORDER — FAMOTIDINE 20 MG/1
TABLET, FILM COATED ORAL
Status: DISPENSED
Start: 2024-04-05

## (undated) RX ORDER — FAMOTIDINE 20 MG/1
TABLET, FILM COATED ORAL
Status: DISPENSED
Start: 2021-07-27

## (undated) RX ORDER — DIPHENHYDRAMINE HCL 25 MG
CAPSULE ORAL
Status: DISPENSED
Start: 2022-01-18

## (undated) RX ORDER — KETOROLAC TROMETHAMINE 15 MG/ML
INJECTION, SOLUTION INTRAMUSCULAR; INTRAVENOUS
Status: DISPENSED
Start: 2022-05-16

## (undated) RX ORDER — KETOROLAC TROMETHAMINE 15 MG/ML
INJECTION, SOLUTION INTRAMUSCULAR; INTRAVENOUS
Status: DISPENSED
Start: 2022-03-21

## (undated) RX ORDER — DEXAMETHASONE SODIUM PHOSPHATE 10 MG/ML
INJECTION, SOLUTION INTRAMUSCULAR; INTRAVENOUS
Status: DISPENSED
Start: 2022-04-14

## (undated) RX ORDER — ONDANSETRON 2 MG/ML
INJECTION INTRAMUSCULAR; INTRAVENOUS
Status: DISPENSED
Start: 2022-05-16

## (undated) RX ORDER — KETOROLAC TROMETHAMINE 30 MG/ML
INJECTION, SOLUTION INTRAMUSCULAR; INTRAVENOUS
Status: DISPENSED
Start: 2021-06-11

## (undated) RX ORDER — LIDOCAINE HYDROCHLORIDE 10 MG/ML
INJECTION, SOLUTION EPIDURAL; INFILTRATION; INTRACAUDAL; PERINEURAL
Status: DISPENSED
Start: 2024-11-27

## (undated) RX ORDER — MAGNESIUM HYDROXIDE/ALUMINUM HYDROXICE/SIMETHICONE 120; 1200; 1200 MG/30ML; MG/30ML; MG/30ML
SUSPENSION ORAL
Status: DISPENSED
Start: 2024-04-05

## (undated) RX ORDER — MEDROXYPROGESTERONE ACETATE 150 MG/ML
INJECTION, SUSPENSION INTRAMUSCULAR
Status: DISPENSED
Start: 2021-02-04

## (undated) RX ORDER — LIDOCAINE HYDROCHLORIDE 10 MG/ML
INJECTION, SOLUTION EPIDURAL; INFILTRATION; INTRACAUDAL; PERINEURAL
Status: DISPENSED
Start: 2022-04-14

## (undated) RX ORDER — SODIUM CHLORIDE 9 MG/ML
INJECTION, SOLUTION INTRAVENOUS
Status: DISPENSED
Start: 2022-05-16

## (undated) RX ORDER — DEXMEDETOMIDINE HYDROCHLORIDE 4 UG/ML
INJECTION, SOLUTION INTRAVENOUS
Status: DISPENSED
Start: 2022-04-14

## (undated) RX ORDER — MAGNESIUM OXIDE 400 MG/1
TABLET ORAL
Status: DISPENSED
Start: 2022-05-21

## (undated) RX ORDER — DEXAMETHASONE SODIUM PHOSPHATE 4 MG/ML
INJECTION, SOLUTION INTRA-ARTICULAR; INTRALESIONAL; INTRAMUSCULAR; INTRAVENOUS; SOFT TISSUE
Status: DISPENSED
Start: 2022-01-18

## (undated) RX ORDER — DEXAMETHASONE SODIUM PHOSPHATE 4 MG/ML
INJECTION, SOLUTION INTRA-ARTICULAR; INTRALESIONAL; INTRAMUSCULAR; INTRAVENOUS; SOFT TISSUE
Status: DISPENSED
Start: 2021-06-11

## (undated) RX ORDER — ACETAMINOPHEN 325 MG/1
TABLET ORAL
Status: DISPENSED
Start: 2022-04-14

## (undated) RX ORDER — PREDNISONE 20 MG/1
TABLET ORAL
Status: DISPENSED
Start: 2021-07-27

## (undated) RX ORDER — KETOROLAC TROMETHAMINE 30 MG/ML
INJECTION, SOLUTION INTRAMUSCULAR; INTRAVENOUS
Status: DISPENSED
Start: 2022-01-18

## (undated) RX ORDER — CLINDAMYCIN PHOSPHATE 900 MG/50ML
INJECTION, SOLUTION INTRAVENOUS
Status: DISPENSED
Start: 2022-04-14

## (undated) RX ORDER — ONDANSETRON 2 MG/ML
INJECTION INTRAMUSCULAR; INTRAVENOUS
Status: DISPENSED
Start: 2024-07-09

## (undated) RX ORDER — ACETAMINOPHEN 325 MG/1
TABLET ORAL
Status: DISPENSED
Start: 2022-03-21

## (undated) RX ORDER — ONDANSETRON 4 MG/1
TABLET, ORALLY DISINTEGRATING ORAL
Status: DISPENSED
Start: 2022-03-20

## (undated) RX ORDER — MEDROXYPROGESTERONE ACETATE 150 MG/ML
INJECTION, SUSPENSION INTRAMUSCULAR
Status: DISPENSED
Start: 2021-05-04

## (undated) RX ORDER — DIPHENHYDRAMINE HCL 25 MG
CAPSULE ORAL
Status: DISPENSED
Start: 2021-06-11

## (undated) RX ORDER — DIPHENHYDRAMINE HCL 25 MG
CAPSULE ORAL
Status: DISPENSED
Start: 2021-07-27

## (undated) RX ORDER — BUPIVACAINE HYDROCHLORIDE 2.5 MG/ML
INJECTION, SOLUTION INFILTRATION; PERINEURAL
Status: DISPENSED
Start: 2022-04-14

## (undated) RX ORDER — LIDOCAINE HYDROCHLORIDE 20 MG/ML
INJECTION, SOLUTION EPIDURAL; INFILTRATION; INTRACAUDAL; PERINEURAL
Status: DISPENSED
Start: 2022-04-14

## (undated) RX ORDER — KETOROLAC TROMETHAMINE 30 MG/ML
INJECTION, SOLUTION INTRAMUSCULAR; INTRAVENOUS
Status: DISPENSED
Start: 2022-04-14

## (undated) RX ORDER — PHENAZOPYRIDINE HYDROCHLORIDE 100 MG/1
TABLET, FILM COATED ORAL
Status: DISPENSED
Start: 2022-04-14

## (undated) RX ORDER — POTASSIUM CHLORIDE 20MEQ/15ML
LIQUID (ML) ORAL
Status: DISPENSED
Start: 2022-05-21

## (undated) RX ORDER — ONDANSETRON 4 MG/1
TABLET, ORALLY DISINTEGRATING ORAL
Status: DISPENSED
Start: 2024-04-05

## (undated) RX ORDER — KETOROLAC TROMETHAMINE 30 MG/ML
INJECTION, SOLUTION INTRAMUSCULAR; INTRAVENOUS
Status: DISPENSED
Start: 2023-02-27